# Patient Record
Sex: MALE | Race: OTHER | URBAN - METROPOLITAN AREA
[De-identification: names, ages, dates, MRNs, and addresses within clinical notes are randomized per-mention and may not be internally consistent; named-entity substitution may affect disease eponyms.]

---

## 2018-01-01 ENCOUNTER — INPATIENT (INPATIENT)
Facility: HOSPITAL | Age: 83
LOS: 27 days | DRG: 870 | End: 2018-02-13
Attending: HOSPITALIST | Admitting: HOSPITALIST
Payer: MEDICARE

## 2018-01-01 VITALS
DIASTOLIC BLOOD PRESSURE: 56 MMHG | HEART RATE: 91 BPM | TEMPERATURE: 103 F | OXYGEN SATURATION: 91 % | RESPIRATION RATE: 41 BRPM | SYSTOLIC BLOOD PRESSURE: 108 MMHG

## 2018-01-01 VITALS — WEIGHT: 179.9 LBS | HEIGHT: 64 IN

## 2018-01-01 DIAGNOSIS — J96.00 ACUTE RESPIRATORY FAILURE, UNSPECIFIED WHETHER WITH HYPOXIA OR HYPERCAPNIA: ICD-10-CM

## 2018-01-01 DIAGNOSIS — K92.2 GASTROINTESTINAL HEMORRHAGE, UNSPECIFIED: ICD-10-CM

## 2018-01-01 DIAGNOSIS — I24.8 OTHER FORMS OF ACUTE ISCHEMIC HEART DISEASE: ICD-10-CM

## 2018-01-01 DIAGNOSIS — N18.4 CHRONIC KIDNEY DISEASE, STAGE 4 (SEVERE): ICD-10-CM

## 2018-01-01 DIAGNOSIS — K25.9 GASTRIC ULCER, UNSPECIFIED AS ACUTE OR CHRONIC, WITHOUT HEMORRHAGE OR PERFORATION: ICD-10-CM

## 2018-01-01 DIAGNOSIS — R13.10 DYSPHAGIA, UNSPECIFIED: ICD-10-CM

## 2018-01-01 DIAGNOSIS — D64.9 ANEMIA, UNSPECIFIED: ICD-10-CM

## 2018-01-01 DIAGNOSIS — E83.39 OTHER DISORDERS OF PHOSPHORUS METABOLISM: ICD-10-CM

## 2018-01-01 DIAGNOSIS — R50.9 FEVER, UNSPECIFIED: ICD-10-CM

## 2018-01-01 DIAGNOSIS — G93.41 METABOLIC ENCEPHALOPATHY: ICD-10-CM

## 2018-01-01 DIAGNOSIS — E11.9 TYPE 2 DIABETES MELLITUS WITHOUT COMPLICATIONS: ICD-10-CM

## 2018-01-01 DIAGNOSIS — I48.91 UNSPECIFIED ATRIAL FIBRILLATION: ICD-10-CM

## 2018-01-01 DIAGNOSIS — E86.0 DEHYDRATION: ICD-10-CM

## 2018-01-01 DIAGNOSIS — E87.2 ACIDOSIS: ICD-10-CM

## 2018-01-01 DIAGNOSIS — K70.30 ALCOHOLIC CIRRHOSIS OF LIVER WITHOUT ASCITES: ICD-10-CM

## 2018-01-01 DIAGNOSIS — K76.7 HEPATORENAL SYNDROME: ICD-10-CM

## 2018-01-01 DIAGNOSIS — K55.9 VASCULAR DISORDER OF INTESTINE, UNSPECIFIED: ICD-10-CM

## 2018-01-01 DIAGNOSIS — N17.9 ACUTE KIDNEY FAILURE, UNSPECIFIED: ICD-10-CM

## 2018-01-01 DIAGNOSIS — I50.22 CHRONIC SYSTOLIC (CONGESTIVE) HEART FAILURE: ICD-10-CM

## 2018-01-01 DIAGNOSIS — R65.21 SEVERE SEPSIS WITH SEPTIC SHOCK: ICD-10-CM

## 2018-01-01 DIAGNOSIS — J69.0 PNEUMONITIS DUE TO INHALATION OF FOOD AND VOMIT: ICD-10-CM

## 2018-01-01 DIAGNOSIS — Z29.9 ENCOUNTER FOR PROPHYLACTIC MEASURES, UNSPECIFIED: ICD-10-CM

## 2018-01-01 DIAGNOSIS — J96.01 ACUTE RESPIRATORY FAILURE WITH HYPOXIA: ICD-10-CM

## 2018-01-01 DIAGNOSIS — A41.9 SEPSIS, UNSPECIFIED ORGANISM: ICD-10-CM

## 2018-01-01 DIAGNOSIS — E87.5 HYPERKALEMIA: ICD-10-CM

## 2018-01-01 DIAGNOSIS — F99 MENTAL DISORDER, NOT OTHERWISE SPECIFIED: ICD-10-CM

## 2018-01-01 DIAGNOSIS — F05 DELIRIUM DUE TO KNOWN PHYSIOLOGICAL CONDITION: ICD-10-CM

## 2018-01-01 DIAGNOSIS — E83.42 HYPOMAGNESEMIA: ICD-10-CM

## 2018-01-01 DIAGNOSIS — R45.1 RESTLESSNESS AND AGITATION: ICD-10-CM

## 2018-01-01 DIAGNOSIS — E87.0 HYPEROSMOLALITY AND HYPERNATREMIA: ICD-10-CM

## 2018-01-01 DIAGNOSIS — K62.5 HEMORRHAGE OF ANUS AND RECTUM: ICD-10-CM

## 2018-01-01 DIAGNOSIS — F10.10 ALCOHOL ABUSE, UNCOMPLICATED: ICD-10-CM

## 2018-01-01 DIAGNOSIS — N18.3 CHRONIC KIDNEY DISEASE, STAGE 3 (MODERATE): ICD-10-CM

## 2018-01-01 DIAGNOSIS — K52.9 NONINFECTIVE GASTROENTERITIS AND COLITIS, UNSPECIFIED: ICD-10-CM

## 2018-01-01 DIAGNOSIS — J11.1 INFLUENZA DUE TO UNIDENTIFIED INFLUENZA VIRUS WITH OTHER RESPIRATORY MANIFESTATIONS: ICD-10-CM

## 2018-01-01 DIAGNOSIS — E87.6 HYPOKALEMIA: ICD-10-CM

## 2018-01-01 DIAGNOSIS — K64.9 UNSPECIFIED HEMORRHOIDS: ICD-10-CM

## 2018-01-01 DIAGNOSIS — N17.0 ACUTE KIDNEY FAILURE WITH TUBULAR NECROSIS: ICD-10-CM

## 2018-01-01 DIAGNOSIS — R74.0 NONSPECIFIC ELEVATION OF LEVELS OF TRANSAMINASE AND LACTIC ACID DEHYDROGENASE [LDH]: ICD-10-CM

## 2018-01-01 DIAGNOSIS — M62.82 RHABDOMYOLYSIS: ICD-10-CM

## 2018-01-01 DIAGNOSIS — F03.90 UNSPECIFIED DEMENTIA WITHOUT BEHAVIORAL DISTURBANCE: ICD-10-CM

## 2018-01-01 DIAGNOSIS — R53.81 OTHER MALAISE: ICD-10-CM

## 2018-01-01 DIAGNOSIS — I50.40 UNSPECIFIED COMBINED SYSTOLIC (CONGESTIVE) AND DIASTOLIC (CONGESTIVE) HEART FAILURE: ICD-10-CM

## 2018-01-01 DIAGNOSIS — K74.60 UNSPECIFIED CIRRHOSIS OF LIVER: ICD-10-CM

## 2018-01-01 DIAGNOSIS — I48.0 PAROXYSMAL ATRIAL FIBRILLATION: ICD-10-CM

## 2018-01-01 DIAGNOSIS — E86.1 HYPOVOLEMIA: ICD-10-CM

## 2018-01-01 DIAGNOSIS — J96.90 RESPIRATORY FAILURE, UNSPECIFIED, UNSPECIFIED WHETHER WITH HYPOXIA OR HYPERCAPNIA: ICD-10-CM

## 2018-01-01 DIAGNOSIS — I50.9 HEART FAILURE, UNSPECIFIED: ICD-10-CM

## 2018-01-01 DIAGNOSIS — I13.0 HYPERTENSIVE HEART AND CHRONIC KIDNEY DISEASE WITH HEART FAILURE AND STAGE 1 THROUGH STAGE 4 CHRONIC KIDNEY DISEASE, OR UNSPECIFIED CHRONIC KIDNEY DISEASE: ICD-10-CM

## 2018-01-01 DIAGNOSIS — Z66 DO NOT RESUSCITATE: ICD-10-CM

## 2018-01-01 DIAGNOSIS — Z51.5 ENCOUNTER FOR PALLIATIVE CARE: ICD-10-CM

## 2018-01-01 DIAGNOSIS — K63.3 ULCER OF INTESTINE: ICD-10-CM

## 2018-01-01 DIAGNOSIS — N28.1 CYST OF KIDNEY, ACQUIRED: ICD-10-CM

## 2018-01-01 LAB
ALBUMIN SERPL ELPH-MCNC: 1.9 G/DL — LOW (ref 3.5–5)
ALBUMIN SERPL ELPH-MCNC: 1.9 G/DL — LOW (ref 3.5–5)
ALBUMIN SERPL ELPH-MCNC: 2.2 G/DL — LOW (ref 3.5–5)
ALBUMIN SERPL ELPH-MCNC: 2.3 G/DL — LOW (ref 3.5–5)
ALBUMIN SERPL ELPH-MCNC: 2.3 G/DL — LOW (ref 3.5–5)
ALBUMIN SERPL ELPH-MCNC: 2.4 G/DL — LOW (ref 3.5–5)
ALBUMIN SERPL ELPH-MCNC: 2.5 G/DL — LOW (ref 3.5–5)
ALBUMIN SERPL ELPH-MCNC: 2.5 G/DL — LOW (ref 3.5–5)
ALBUMIN SERPL ELPH-MCNC: 2.6 G/DL — LOW (ref 3.5–5)
ALBUMIN SERPL ELPH-MCNC: 3 G/DL — LOW (ref 3.5–5)
ALBUMIN SERPL ELPH-MCNC: 3.3 G/DL — LOW (ref 3.5–5)
ALP SERPL-CCNC: 40 U/L — SIGNIFICANT CHANGE UP (ref 40–120)
ALP SERPL-CCNC: 40 U/L — SIGNIFICANT CHANGE UP (ref 40–120)
ALP SERPL-CCNC: 47 U/L — SIGNIFICANT CHANGE UP (ref 40–120)
ALP SERPL-CCNC: 49 U/L — SIGNIFICANT CHANGE UP (ref 40–120)
ALP SERPL-CCNC: 51 U/L — SIGNIFICANT CHANGE UP (ref 40–120)
ALP SERPL-CCNC: 51 U/L — SIGNIFICANT CHANGE UP (ref 40–120)
ALP SERPL-CCNC: 58 U/L — SIGNIFICANT CHANGE UP (ref 40–120)
ALP SERPL-CCNC: 65 U/L — SIGNIFICANT CHANGE UP (ref 40–120)
ALP SERPL-CCNC: 71 U/L — SIGNIFICANT CHANGE UP (ref 40–120)
ALP SERPL-CCNC: 72 U/L — SIGNIFICANT CHANGE UP (ref 40–120)
ALP SERPL-CCNC: 72 U/L — SIGNIFICANT CHANGE UP (ref 40–120)
ALP SERPL-CCNC: 75 U/L — SIGNIFICANT CHANGE UP (ref 40–120)
ALP SERPL-CCNC: 75 U/L — SIGNIFICANT CHANGE UP (ref 40–120)
ALT FLD-CCNC: 113 U/L DA — HIGH (ref 10–60)
ALT FLD-CCNC: 131 U/L DA — HIGH (ref 10–60)
ALT FLD-CCNC: 179 U/L DA — HIGH (ref 10–60)
ALT FLD-CCNC: 212 U/L DA — HIGH (ref 10–60)
ALT FLD-CCNC: 30 U/L DA — SIGNIFICANT CHANGE UP (ref 10–60)
ALT FLD-CCNC: 34 U/L DA — SIGNIFICANT CHANGE UP (ref 10–60)
ALT FLD-CCNC: 40 U/L DA — SIGNIFICANT CHANGE UP (ref 10–60)
ALT FLD-CCNC: 48 U/L DA — SIGNIFICANT CHANGE UP (ref 10–60)
ALT FLD-CCNC: 49 U/L DA — SIGNIFICANT CHANGE UP (ref 10–60)
ALT FLD-CCNC: 53 U/L DA — SIGNIFICANT CHANGE UP (ref 10–60)
ALT FLD-CCNC: 70 U/L DA — HIGH (ref 10–60)
ALT FLD-CCNC: 81 U/L DA — HIGH (ref 10–60)
ALT FLD-CCNC: 96 U/L DA — HIGH (ref 10–60)
AMMONIA BLD-MCNC: 17 UMOL/L — SIGNIFICANT CHANGE UP (ref 11–32)
AMMONIA BLD-MCNC: 57 UMOL/L — HIGH (ref 11–32)
ANION GAP SERPL CALC-SCNC: 10 MMOL/L — SIGNIFICANT CHANGE UP (ref 5–17)
ANION GAP SERPL CALC-SCNC: 11 MMOL/L — SIGNIFICANT CHANGE UP (ref 5–17)
ANION GAP SERPL CALC-SCNC: 12 MMOL/L — SIGNIFICANT CHANGE UP (ref 5–17)
ANION GAP SERPL CALC-SCNC: 13 MMOL/L — SIGNIFICANT CHANGE UP (ref 5–17)
ANION GAP SERPL CALC-SCNC: 15 MMOL/L — SIGNIFICANT CHANGE UP (ref 5–17)
ANION GAP SERPL CALC-SCNC: 19 MMOL/L — HIGH (ref 5–17)
ANION GAP SERPL CALC-SCNC: 27 MMOL/L — HIGH (ref 5–17)
ANION GAP SERPL CALC-SCNC: 6 MMOL/L — SIGNIFICANT CHANGE UP (ref 5–17)
ANION GAP SERPL CALC-SCNC: 7 MMOL/L — SIGNIFICANT CHANGE UP (ref 5–17)
ANION GAP SERPL CALC-SCNC: 8 MMOL/L — SIGNIFICANT CHANGE UP (ref 5–17)
ANION GAP SERPL CALC-SCNC: 9 MMOL/L — SIGNIFICANT CHANGE UP (ref 5–17)
APPEARANCE UR: ABNORMAL
APPEARANCE UR: CLEAR — SIGNIFICANT CHANGE UP
APTT BLD: 180.2 SEC — SIGNIFICANT CHANGE UP (ref 27.5–37.4)
APTT BLD: 25.4 SEC — LOW (ref 27.5–37.4)
APTT BLD: 26 SEC — LOW (ref 27.5–37.4)
APTT BLD: 26.2 SEC — LOW (ref 27.5–37.4)
APTT BLD: 26.8 SEC — LOW (ref 27.5–37.4)
APTT BLD: 27.8 SEC — SIGNIFICANT CHANGE UP (ref 27.5–37.4)
APTT BLD: 37.7 SEC — HIGH (ref 27.5–37.4)
APTT BLD: 88.9 SEC — HIGH (ref 27.5–37.4)
APTT BLD: 90.3 SEC — HIGH (ref 27.5–37.4)
AST SERPL-CCNC: 163 U/L — HIGH (ref 10–40)
AST SERPL-CCNC: 247 U/L — HIGH (ref 10–40)
AST SERPL-CCNC: 291 U/L — HIGH (ref 10–40)
AST SERPL-CCNC: 32 U/L — SIGNIFICANT CHANGE UP (ref 10–40)
AST SERPL-CCNC: 332 U/L — HIGH (ref 10–40)
AST SERPL-CCNC: 35 U/L — SIGNIFICANT CHANGE UP (ref 10–40)
AST SERPL-CCNC: 35 U/L — SIGNIFICANT CHANGE UP (ref 10–40)
AST SERPL-CCNC: 378 U/L — HIGH (ref 10–40)
AST SERPL-CCNC: 41 U/L — HIGH (ref 10–40)
AST SERPL-CCNC: 52 U/L — HIGH (ref 10–40)
AST SERPL-CCNC: 53 U/L — HIGH (ref 10–40)
BASE EXCESS BLDA CALC-SCNC: -16.8 MMOL/L — LOW (ref -2–2)
BASE EXCESS BLDA CALC-SCNC: -2.2 MMOL/L — LOW (ref -2–2)
BASE EXCESS BLDA CALC-SCNC: -3.1 MMOL/L — LOW (ref -2–2)
BASE EXCESS BLDA CALC-SCNC: -3.7 MMOL/L — LOW (ref -2–2)
BASE EXCESS BLDA CALC-SCNC: -6 MMOL/L — LOW (ref -2–2)
BASE EXCESS BLDA CALC-SCNC: -7.3 MMOL/L — LOW (ref -2–2)
BASE EXCESS BLDA CALC-SCNC: -9 MMOL/L — LOW (ref -2–2)
BASE EXCESS BLDV CALC-SCNC: -9.2 MMOL/L — LOW (ref -2–2)
BASOPHILS # BLD AUTO: 0 K/UL — SIGNIFICANT CHANGE UP (ref 0–0.2)
BASOPHILS # BLD AUTO: 0.1 K/UL — SIGNIFICANT CHANGE UP (ref 0–0.2)
BASOPHILS # BLD AUTO: 0.2 K/UL — SIGNIFICANT CHANGE UP (ref 0–0.2)
BASOPHILS # BLD AUTO: 0.2 K/UL — SIGNIFICANT CHANGE UP (ref 0–0.2)
BASOPHILS NFR BLD AUTO: 0.2 % — SIGNIFICANT CHANGE UP (ref 0–2)
BASOPHILS NFR BLD AUTO: 0.5 % — SIGNIFICANT CHANGE UP (ref 0–2)
BASOPHILS NFR BLD AUTO: 0.5 % — SIGNIFICANT CHANGE UP (ref 0–2)
BASOPHILS NFR BLD AUTO: 0.6 % — SIGNIFICANT CHANGE UP (ref 0–2)
BASOPHILS NFR BLD AUTO: 0.6 % — SIGNIFICANT CHANGE UP (ref 0–2)
BASOPHILS NFR BLD AUTO: 0.7 % — SIGNIFICANT CHANGE UP (ref 0–2)
BASOPHILS NFR BLD AUTO: 0.8 % — SIGNIFICANT CHANGE UP (ref 0–2)
BASOPHILS NFR BLD AUTO: 0.9 % — SIGNIFICANT CHANGE UP (ref 0–2)
BASOPHILS NFR BLD AUTO: 1 % — SIGNIFICANT CHANGE UP (ref 0–2)
BASOPHILS NFR BLD AUTO: 1.1 % — SIGNIFICANT CHANGE UP (ref 0–2)
BASOPHILS NFR BLD AUTO: 1.3 % — SIGNIFICANT CHANGE UP (ref 0–2)
BASOPHILS NFR BLD AUTO: 1.4 % — SIGNIFICANT CHANGE UP (ref 0–2)
BASOPHILS NFR BLD AUTO: 2.1 % — HIGH (ref 0–2)
BILIRUB DIRECT SERPL-MCNC: 0.5 MG/DL — HIGH (ref 0–0.2)
BILIRUB INDIRECT FLD-MCNC: 0.5 MG/DL — SIGNIFICANT CHANGE UP (ref 0.2–1)
BILIRUB SERPL-MCNC: 0.4 MG/DL — SIGNIFICANT CHANGE UP (ref 0.2–1.2)
BILIRUB SERPL-MCNC: 0.5 MG/DL — SIGNIFICANT CHANGE UP (ref 0.2–1.2)
BILIRUB SERPL-MCNC: 0.5 MG/DL — SIGNIFICANT CHANGE UP (ref 0.2–1.2)
BILIRUB SERPL-MCNC: 0.6 MG/DL — SIGNIFICANT CHANGE UP (ref 0.2–1.2)
BILIRUB SERPL-MCNC: 0.7 MG/DL — SIGNIFICANT CHANGE UP (ref 0.2–1.2)
BILIRUB SERPL-MCNC: 0.7 MG/DL — SIGNIFICANT CHANGE UP (ref 0.2–1.2)
BILIRUB SERPL-MCNC: 0.9 MG/DL — SIGNIFICANT CHANGE UP (ref 0.2–1.2)
BILIRUB SERPL-MCNC: 1 MG/DL — SIGNIFICANT CHANGE UP (ref 0.2–1.2)
BILIRUB SERPL-MCNC: 1 MG/DL — SIGNIFICANT CHANGE UP (ref 0.2–1.2)
BILIRUB SERPL-MCNC: 1.1 MG/DL — SIGNIFICANT CHANGE UP (ref 0.2–1.2)
BILIRUB SERPL-MCNC: 1.2 MG/DL — SIGNIFICANT CHANGE UP (ref 0.2–1.2)
BILIRUB UR-MCNC: ABNORMAL
BILIRUB UR-MCNC: NEGATIVE — SIGNIFICANT CHANGE UP
BLOOD GAS COMMENTS ARTERIAL: SIGNIFICANT CHANGE UP
BUN SERPL-MCNC: 10 MG/DL — SIGNIFICANT CHANGE UP (ref 7–18)
BUN SERPL-MCNC: 10 MG/DL — SIGNIFICANT CHANGE UP (ref 7–18)
BUN SERPL-MCNC: 11 MG/DL — SIGNIFICANT CHANGE UP (ref 7–18)
BUN SERPL-MCNC: 12 MG/DL — SIGNIFICANT CHANGE UP (ref 7–18)
BUN SERPL-MCNC: 13 MG/DL — SIGNIFICANT CHANGE UP (ref 7–18)
BUN SERPL-MCNC: 14 MG/DL — SIGNIFICANT CHANGE UP (ref 7–18)
BUN SERPL-MCNC: 17 MG/DL — SIGNIFICANT CHANGE UP (ref 7–18)
BUN SERPL-MCNC: 18 MG/DL — SIGNIFICANT CHANGE UP (ref 7–18)
BUN SERPL-MCNC: 22 MG/DL — HIGH (ref 7–18)
BUN SERPL-MCNC: 25 MG/DL — HIGH (ref 7–18)
BUN SERPL-MCNC: 34 MG/DL — HIGH (ref 7–18)
BUN SERPL-MCNC: 34 MG/DL — HIGH (ref 7–18)
BUN SERPL-MCNC: 43 MG/DL — HIGH (ref 7–18)
BUN SERPL-MCNC: 46 MG/DL — HIGH (ref 7–18)
BUN SERPL-MCNC: 47 MG/DL — HIGH (ref 7–18)
BUN SERPL-MCNC: 55 MG/DL — HIGH (ref 7–18)
BUN SERPL-MCNC: 62 MG/DL — HIGH (ref 7–18)
BUN SERPL-MCNC: 62 MG/DL — HIGH (ref 7–18)
BUN SERPL-MCNC: 66 MG/DL — HIGH (ref 7–18)
BUN SERPL-MCNC: 67 MG/DL — HIGH (ref 7–18)
BUN SERPL-MCNC: 70 MG/DL — HIGH (ref 7–18)
BUN SERPL-MCNC: 71 MG/DL — HIGH (ref 7–18)
BUN SERPL-MCNC: 73 MG/DL — HIGH (ref 7–18)
BUN SERPL-MCNC: 73 MG/DL — HIGH (ref 7–18)
BUN SERPL-MCNC: 78 MG/DL — HIGH (ref 7–18)
BUN SERPL-MCNC: 8 MG/DL — SIGNIFICANT CHANGE UP (ref 7–18)
BUN SERPL-MCNC: 80 MG/DL — HIGH (ref 7–18)
BUN SERPL-MCNC: 9 MG/DL — SIGNIFICANT CHANGE UP (ref 7–18)
BUN SERPL-MCNC: SIGNIFICANT CHANGE UP MG/DL (ref 7–18)
CALCIUM SERPL-MCNC: 10.6 MG/DL — HIGH (ref 8.4–10.5)
CALCIUM SERPL-MCNC: 8.2 MG/DL — LOW (ref 8.4–10.5)
CALCIUM SERPL-MCNC: 8.2 MG/DL — LOW (ref 8.4–10.5)
CALCIUM SERPL-MCNC: 8.3 MG/DL — LOW (ref 8.4–10.5)
CALCIUM SERPL-MCNC: 8.4 MG/DL — SIGNIFICANT CHANGE UP (ref 8.4–10.5)
CALCIUM SERPL-MCNC: 8.5 MG/DL — SIGNIFICANT CHANGE UP (ref 8.4–10.5)
CALCIUM SERPL-MCNC: 8.6 MG/DL — SIGNIFICANT CHANGE UP (ref 8.4–10.5)
CALCIUM SERPL-MCNC: 8.7 MG/DL — SIGNIFICANT CHANGE UP (ref 8.4–10.5)
CALCIUM SERPL-MCNC: 8.8 MG/DL — SIGNIFICANT CHANGE UP (ref 8.4–10.5)
CALCIUM SERPL-MCNC: 8.9 MG/DL — SIGNIFICANT CHANGE UP (ref 8.4–10.5)
CALCIUM SERPL-MCNC: 8.9 MG/DL — SIGNIFICANT CHANGE UP (ref 8.4–10.5)
CALCIUM SERPL-MCNC: 9 MG/DL — SIGNIFICANT CHANGE UP (ref 8.4–10.5)
CALCIUM SERPL-MCNC: 9.1 MG/DL — SIGNIFICANT CHANGE UP (ref 8.4–10.5)
CALCIUM SERPL-MCNC: 9.2 MG/DL — SIGNIFICANT CHANGE UP (ref 8.4–10.5)
CALCIUM SERPL-MCNC: 9.6 MG/DL — SIGNIFICANT CHANGE UP (ref 8.4–10.5)
CALCIUM SERPL-MCNC: SIGNIFICANT CHANGE UP MG/DL (ref 8.4–10.5)
CHLORIDE SERPL-SCNC: 104 MMOL/L — SIGNIFICANT CHANGE UP (ref 96–108)
CHLORIDE SERPL-SCNC: 104 MMOL/L — SIGNIFICANT CHANGE UP (ref 96–108)
CHLORIDE SERPL-SCNC: 105 MMOL/L — SIGNIFICANT CHANGE UP (ref 96–108)
CHLORIDE SERPL-SCNC: 106 MMOL/L — SIGNIFICANT CHANGE UP (ref 96–108)
CHLORIDE SERPL-SCNC: 107 MMOL/L — SIGNIFICANT CHANGE UP (ref 96–108)
CHLORIDE SERPL-SCNC: 108 MMOL/L — SIGNIFICANT CHANGE UP (ref 96–108)
CHLORIDE SERPL-SCNC: 109 MMOL/L — HIGH (ref 96–108)
CHLORIDE SERPL-SCNC: 110 MMOL/L — HIGH (ref 96–108)
CHLORIDE SERPL-SCNC: 111 MMOL/L — HIGH (ref 96–108)
CHLORIDE SERPL-SCNC: 112 MMOL/L — HIGH (ref 96–108)
CHLORIDE SERPL-SCNC: 113 MMOL/L — HIGH (ref 96–108)
CHLORIDE SERPL-SCNC: 114 MMOL/L — HIGH (ref 96–108)
CHLORIDE SERPL-SCNC: 116 MMOL/L — HIGH (ref 96–108)
CHLORIDE SERPL-SCNC: 116 MMOL/L — HIGH (ref 96–108)
CHLORIDE SERPL-SCNC: 117 MMOL/L — HIGH (ref 96–108)
CHLORIDE SERPL-SCNC: 117 MMOL/L — HIGH (ref 96–108)
CHLORIDE SERPL-SCNC: 118 MMOL/L — HIGH (ref 96–108)
CHLORIDE SERPL-SCNC: 118 MMOL/L — HIGH (ref 96–108)
CHLORIDE SERPL-SCNC: 119 MMOL/L — HIGH (ref 96–108)
CHLORIDE SERPL-SCNC: 119 MMOL/L — HIGH (ref 96–108)
CHLORIDE SERPL-SCNC: 120 MMOL/L — HIGH (ref 96–108)
CHLORIDE SERPL-SCNC: 120 MMOL/L — HIGH (ref 96–108)
CHLORIDE SERPL-SCNC: 121 MMOL/L — HIGH (ref 96–108)
CHLORIDE SERPL-SCNC: 122 MMOL/L — HIGH (ref 96–108)
CHLORIDE SERPL-SCNC: 122 MMOL/L — HIGH (ref 96–108)
CHLORIDE SERPL-SCNC: SIGNIFICANT CHANGE UP MMOL/L (ref 96–108)
CHLORIDE UR-SCNC: 49 MMOL/L — LOW (ref 55–125)
CHOLEST SERPL-MCNC: 105 MG/DL — SIGNIFICANT CHANGE UP (ref 10–199)
CHOLEST SERPL-MCNC: 132 MG/DL — SIGNIFICANT CHANGE UP (ref 10–199)
CK MB BLD-MCNC: 0.5 % — SIGNIFICANT CHANGE UP (ref 0–3.5)
CK MB BLD-MCNC: 0.5 % — SIGNIFICANT CHANGE UP (ref 0–3.5)
CK MB BLD-MCNC: 0.6 % — SIGNIFICANT CHANGE UP (ref 0–3.5)
CK MB CFR SERPL CALC: 5 NG/ML — HIGH (ref 0–3.6)
CK MB CFR SERPL CALC: 8.4 NG/ML — HIGH (ref 0–3.6)
CK MB CFR SERPL CALC: 8.7 NG/ML — HIGH (ref 0–3.6)
CK SERPL-CCNC: 1531 U/L — HIGH (ref 35–232)
CK SERPL-CCNC: 1591 U/L — HIGH (ref 35–232)
CK SERPL-CCNC: 871 U/L — HIGH (ref 35–232)
CO2 SERPL-SCNC: 13 MMOL/L — LOW (ref 22–31)
CO2 SERPL-SCNC: 19 MMOL/L — LOW (ref 22–31)
CO2 SERPL-SCNC: 20 MMOL/L — LOW (ref 22–31)
CO2 SERPL-SCNC: 22 MMOL/L — SIGNIFICANT CHANGE UP (ref 22–31)
CO2 SERPL-SCNC: 23 MMOL/L — SIGNIFICANT CHANGE UP (ref 22–31)
CO2 SERPL-SCNC: 24 MMOL/L — SIGNIFICANT CHANGE UP (ref 22–31)
CO2 SERPL-SCNC: 25 MMOL/L — SIGNIFICANT CHANGE UP (ref 22–31)
CO2 SERPL-SCNC: 26 MMOL/L — SIGNIFICANT CHANGE UP (ref 22–31)
CO2 SERPL-SCNC: 27 MMOL/L — SIGNIFICANT CHANGE UP (ref 22–31)
CO2 SERPL-SCNC: 28 MMOL/L — SIGNIFICANT CHANGE UP (ref 22–31)
CO2 SERPL-SCNC: 29 MMOL/L — SIGNIFICANT CHANGE UP (ref 22–31)
CO2 SERPL-SCNC: SIGNIFICANT CHANGE UP MMOL/L (ref 22–31)
COLOR SPEC: YELLOW — SIGNIFICANT CHANGE UP
COLOR SPEC: YELLOW — SIGNIFICANT CHANGE UP
CREAT ?TM UR-MCNC: 117 MG/DL — SIGNIFICANT CHANGE UP
CREAT ?TM UR-MCNC: 206 MG/DL — SIGNIFICANT CHANGE UP
CREAT ?TM UR-MCNC: 44 MG/DL — SIGNIFICANT CHANGE UP
CREAT SERPL-MCNC: 0.68 MG/DL — SIGNIFICANT CHANGE UP (ref 0.5–1.3)
CREAT SERPL-MCNC: 0.7 MG/DL — SIGNIFICANT CHANGE UP (ref 0.5–1.3)
CREAT SERPL-MCNC: 0.73 MG/DL — SIGNIFICANT CHANGE UP (ref 0.5–1.3)
CREAT SERPL-MCNC: 0.75 MG/DL — SIGNIFICANT CHANGE UP (ref 0.5–1.3)
CREAT SERPL-MCNC: 0.78 MG/DL — SIGNIFICANT CHANGE UP (ref 0.5–1.3)
CREAT SERPL-MCNC: 0.78 MG/DL — SIGNIFICANT CHANGE UP (ref 0.5–1.3)
CREAT SERPL-MCNC: 0.85 MG/DL — SIGNIFICANT CHANGE UP (ref 0.5–1.3)
CREAT SERPL-MCNC: 0.89 MG/DL — SIGNIFICANT CHANGE UP (ref 0.5–1.3)
CREAT SERPL-MCNC: 0.89 MG/DL — SIGNIFICANT CHANGE UP (ref 0.5–1.3)
CREAT SERPL-MCNC: 0.93 MG/DL — SIGNIFICANT CHANGE UP (ref 0.5–1.3)
CREAT SERPL-MCNC: 0.94 MG/DL — SIGNIFICANT CHANGE UP (ref 0.5–1.3)
CREAT SERPL-MCNC: 0.97 MG/DL — SIGNIFICANT CHANGE UP (ref 0.5–1.3)
CREAT SERPL-MCNC: 1.03 MG/DL — SIGNIFICANT CHANGE UP (ref 0.5–1.3)
CREAT SERPL-MCNC: 1.08 MG/DL — SIGNIFICANT CHANGE UP (ref 0.5–1.3)
CREAT SERPL-MCNC: 1.11 MG/DL — SIGNIFICANT CHANGE UP (ref 0.5–1.3)
CREAT SERPL-MCNC: 1.22 MG/DL — SIGNIFICANT CHANGE UP (ref 0.5–1.3)
CREAT SERPL-MCNC: 1.24 MG/DL — SIGNIFICANT CHANGE UP (ref 0.5–1.3)
CREAT SERPL-MCNC: 1.34 MG/DL — HIGH (ref 0.5–1.3)
CREAT SERPL-MCNC: 1.46 MG/DL — HIGH (ref 0.5–1.3)
CREAT SERPL-MCNC: 1.62 MG/DL — HIGH (ref 0.5–1.3)
CREAT SERPL-MCNC: 1.97 MG/DL — HIGH (ref 0.5–1.3)
CREAT SERPL-MCNC: 2.09 MG/DL — HIGH (ref 0.5–1.3)
CREAT SERPL-MCNC: 2.25 MG/DL — HIGH (ref 0.5–1.3)
CREAT SERPL-MCNC: 2.36 MG/DL — HIGH (ref 0.5–1.3)
CREAT SERPL-MCNC: 2.56 MG/DL — HIGH (ref 0.5–1.3)
CREAT SERPL-MCNC: 2.58 MG/DL — HIGH (ref 0.5–1.3)
CREAT SERPL-MCNC: 2.86 MG/DL — HIGH (ref 0.5–1.3)
CREAT SERPL-MCNC: 3.8 MG/DL — HIGH (ref 0.5–1.3)
CREAT SERPL-MCNC: 3.82 MG/DL — HIGH (ref 0.5–1.3)
CREAT SERPL-MCNC: 4.02 MG/DL — HIGH (ref 0.5–1.3)
CREAT SERPL-MCNC: 4.06 MG/DL — HIGH (ref 0.5–1.3)
CREAT SERPL-MCNC: 4.46 MG/DL — HIGH (ref 0.5–1.3)
CREAT SERPL-MCNC: 4.56 MG/DL — HIGH (ref 0.5–1.3)
CREAT SERPL-MCNC: 4.81 MG/DL — HIGH (ref 0.5–1.3)
CREAT SERPL-MCNC: SIGNIFICANT CHANGE UP MG/DL (ref 0.5–1.3)
CULTURE RESULTS: NO GROWTH — SIGNIFICANT CHANGE UP
CULTURE RESULTS: SIGNIFICANT CHANGE UP
DIFF PNL FLD: ABNORMAL
DIFF PNL FLD: ABNORMAL
EOSINOPHIL # BLD AUTO: 0 K/UL — SIGNIFICANT CHANGE UP (ref 0–0.5)
EOSINOPHIL # BLD AUTO: 0.1 K/UL — SIGNIFICANT CHANGE UP (ref 0–0.5)
EOSINOPHIL # BLD AUTO: 0.2 K/UL — SIGNIFICANT CHANGE UP (ref 0–0.5)
EOSINOPHIL # BLD AUTO: 0.2 K/UL — SIGNIFICANT CHANGE UP (ref 0–0.5)
EOSINOPHIL # BLD AUTO: 0.4 K/UL — SIGNIFICANT CHANGE UP (ref 0–0.5)
EOSINOPHIL NFR BLD AUTO: 0 % — SIGNIFICANT CHANGE UP (ref 0–6)
EOSINOPHIL NFR BLD AUTO: 0.1 % — SIGNIFICANT CHANGE UP (ref 0–6)
EOSINOPHIL NFR BLD AUTO: 0.6 % — SIGNIFICANT CHANGE UP (ref 0–6)
EOSINOPHIL NFR BLD AUTO: 1 % — SIGNIFICANT CHANGE UP (ref 0–6)
EOSINOPHIL NFR BLD AUTO: 1 % — SIGNIFICANT CHANGE UP (ref 0–6)
EOSINOPHIL NFR BLD AUTO: 1.1 % — SIGNIFICANT CHANGE UP (ref 0–6)
EOSINOPHIL NFR BLD AUTO: 1.1 % — SIGNIFICANT CHANGE UP (ref 0–6)
EOSINOPHIL NFR BLD AUTO: 1.2 % — SIGNIFICANT CHANGE UP (ref 0–6)
EOSINOPHIL NFR BLD AUTO: 1.5 % — SIGNIFICANT CHANGE UP (ref 0–6)
EOSINOPHIL NFR BLD AUTO: 1.6 % — SIGNIFICANT CHANGE UP (ref 0–6)
EOSINOPHIL NFR BLD AUTO: 2.4 % — SIGNIFICANT CHANGE UP (ref 0–6)
EOSINOPHIL NFR BLD AUTO: 2.4 % — SIGNIFICANT CHANGE UP (ref 0–6)
EOSINOPHIL NFR BLD AUTO: 2.5 % — SIGNIFICANT CHANGE UP (ref 0–6)
EOSINOPHIL NFR BLD AUTO: 2.8 % — SIGNIFICANT CHANGE UP (ref 0–6)
EOSINOPHIL NFR BLD AUTO: 2.9 % — SIGNIFICANT CHANGE UP (ref 0–6)
EOSINOPHIL NFR BLD AUTO: 6.3 % — HIGH (ref 0–6)
ETHANOL SERPL-MCNC: <3 MG/DL — SIGNIFICANT CHANGE UP (ref 0–10)
FLUAV H1 2009 PAND RNA SPEC QL NAA+PROBE: DETECTED
GLUCOSE SERPL-MCNC: 108 MG/DL — HIGH (ref 70–99)
GLUCOSE SERPL-MCNC: 120 MG/DL — HIGH (ref 70–99)
GLUCOSE SERPL-MCNC: 121 MG/DL — HIGH (ref 70–99)
GLUCOSE SERPL-MCNC: 123 MG/DL — HIGH (ref 70–99)
GLUCOSE SERPL-MCNC: 127 MG/DL — HIGH (ref 70–99)
GLUCOSE SERPL-MCNC: 129 MG/DL — HIGH (ref 70–99)
GLUCOSE SERPL-MCNC: 131 MG/DL — HIGH (ref 70–99)
GLUCOSE SERPL-MCNC: 146 MG/DL — HIGH (ref 70–99)
GLUCOSE SERPL-MCNC: 149 MG/DL — HIGH (ref 70–99)
GLUCOSE SERPL-MCNC: 150 MG/DL — HIGH (ref 70–99)
GLUCOSE SERPL-MCNC: 151 MG/DL — HIGH (ref 70–99)
GLUCOSE SERPL-MCNC: 162 MG/DL — HIGH (ref 70–99)
GLUCOSE SERPL-MCNC: 175 MG/DL — HIGH (ref 70–99)
GLUCOSE SERPL-MCNC: 175 MG/DL — HIGH (ref 70–99)
GLUCOSE SERPL-MCNC: 177 MG/DL — HIGH (ref 70–99)
GLUCOSE SERPL-MCNC: 179 MG/DL — HIGH (ref 70–99)
GLUCOSE SERPL-MCNC: 191 MG/DL — HIGH (ref 70–99)
GLUCOSE SERPL-MCNC: 193 MG/DL — HIGH (ref 70–99)
GLUCOSE SERPL-MCNC: 198 MG/DL — HIGH (ref 70–99)
GLUCOSE SERPL-MCNC: 210 MG/DL — HIGH (ref 70–99)
GLUCOSE SERPL-MCNC: 213 MG/DL — HIGH (ref 70–99)
GLUCOSE SERPL-MCNC: 223 MG/DL — HIGH (ref 70–99)
GLUCOSE SERPL-MCNC: 231 MG/DL — HIGH (ref 70–99)
GLUCOSE SERPL-MCNC: 241 MG/DL — HIGH (ref 70–99)
GLUCOSE SERPL-MCNC: 245 MG/DL — HIGH (ref 70–99)
GLUCOSE SERPL-MCNC: 248 MG/DL — HIGH (ref 70–99)
GLUCOSE SERPL-MCNC: 265 MG/DL — HIGH (ref 70–99)
GLUCOSE SERPL-MCNC: 286 MG/DL — HIGH (ref 70–99)
GLUCOSE SERPL-MCNC: 294 MG/DL — HIGH (ref 70–99)
GLUCOSE SERPL-MCNC: 305 MG/DL — HIGH (ref 70–99)
GLUCOSE SERPL-MCNC: 96 MG/DL — SIGNIFICANT CHANGE UP (ref 70–99)
GLUCOSE SERPL-MCNC: 97 MG/DL — SIGNIFICANT CHANGE UP (ref 70–99)
GLUCOSE SERPL-MCNC: 98 MG/DL — SIGNIFICANT CHANGE UP (ref 70–99)
GLUCOSE SERPL-MCNC: 98 MG/DL — SIGNIFICANT CHANGE UP (ref 70–99)
GLUCOSE SERPL-MCNC: SIGNIFICANT CHANGE UP MG/DL (ref 70–99)
GLUCOSE UR QL: NEGATIVE — SIGNIFICANT CHANGE UP
GLUCOSE UR QL: NEGATIVE — SIGNIFICANT CHANGE UP
GRAM STN FLD: SIGNIFICANT CHANGE UP
GRAM STN FLD: SIGNIFICANT CHANGE UP
HAV IGM SER-ACNC: SIGNIFICANT CHANGE UP
HBA1C BLD-MCNC: 7.5 % — HIGH (ref 4–5.6)
HBV CORE IGM SER-ACNC: SIGNIFICANT CHANGE UP
HBV SURFACE AG SER-ACNC: SIGNIFICANT CHANGE UP
HCO3 BLDA-SCNC: 16 MMOL/L — LOW (ref 23–27)
HCO3 BLDA-SCNC: 16 MMOL/L — LOW (ref 23–27)
HCO3 BLDA-SCNC: 18 MMOL/L — LOW (ref 23–27)
HCO3 BLDA-SCNC: 18 MMOL/L — LOW (ref 23–27)
HCO3 BLDA-SCNC: 20 MMOL/L — LOW (ref 23–27)
HCO3 BLDA-SCNC: 22 MMOL/L — LOW (ref 23–27)
HCO3 BLDA-SCNC: 8 MMOL/L — LOW (ref 23–27)
HCO3 BLDV-SCNC: 18 MMOL/L — LOW (ref 21–29)
HCT VFR BLD CALC: 34.7 % — LOW (ref 39–50)
HCT VFR BLD CALC: 35.1 % — LOW (ref 39–50)
HCT VFR BLD CALC: 35.9 % — LOW (ref 39–50)
HCT VFR BLD CALC: 36.1 % — LOW (ref 39–50)
HCT VFR BLD CALC: 36.2 % — LOW (ref 39–50)
HCT VFR BLD CALC: 36.3 % — LOW (ref 39–50)
HCT VFR BLD CALC: 36.4 % — LOW (ref 39–50)
HCT VFR BLD CALC: 37.5 % — LOW (ref 39–50)
HCT VFR BLD CALC: 37.9 % — LOW (ref 39–50)
HCT VFR BLD CALC: 38 % — LOW (ref 39–50)
HCT VFR BLD CALC: 38.3 % — LOW (ref 39–50)
HCT VFR BLD CALC: 38.4 % — LOW (ref 39–50)
HCT VFR BLD CALC: 38.5 % — LOW (ref 39–50)
HCT VFR BLD CALC: 39 % — SIGNIFICANT CHANGE UP (ref 39–50)
HCT VFR BLD CALC: 39.1 % — SIGNIFICANT CHANGE UP (ref 39–50)
HCT VFR BLD CALC: 39.4 % — SIGNIFICANT CHANGE UP (ref 39–50)
HCT VFR BLD CALC: 39.4 % — SIGNIFICANT CHANGE UP (ref 39–50)
HCT VFR BLD CALC: 41.5 % — SIGNIFICANT CHANGE UP (ref 39–50)
HCT VFR BLD CALC: 41.8 % — SIGNIFICANT CHANGE UP (ref 39–50)
HCT VFR BLD CALC: 42 % — SIGNIFICANT CHANGE UP (ref 39–50)
HCT VFR BLD CALC: 42.1 % — SIGNIFICANT CHANGE UP (ref 39–50)
HCT VFR BLD CALC: 42.4 % — SIGNIFICANT CHANGE UP (ref 39–50)
HCT VFR BLD CALC: 42.8 % — SIGNIFICANT CHANGE UP (ref 39–50)
HCT VFR BLD CALC: 43 % — SIGNIFICANT CHANGE UP (ref 39–50)
HCT VFR BLD CALC: 43.3 % — SIGNIFICANT CHANGE UP (ref 39–50)
HCT VFR BLD CALC: 44 % — SIGNIFICANT CHANGE UP (ref 39–50)
HCT VFR BLD CALC: 44.5 % — SIGNIFICANT CHANGE UP (ref 39–50)
HCT VFR BLD CALC: 44.8 % — SIGNIFICANT CHANGE UP (ref 39–50)
HCT VFR BLD CALC: 44.9 % — SIGNIFICANT CHANGE UP (ref 39–50)
HCT VFR BLD CALC: 45.2 % — SIGNIFICANT CHANGE UP (ref 39–50)
HCT VFR BLD CALC: 45.3 % — SIGNIFICANT CHANGE UP (ref 39–50)
HCT VFR BLD CALC: 46.2 % — SIGNIFICANT CHANGE UP (ref 39–50)
HCT VFR BLD CALC: 48.5 % — SIGNIFICANT CHANGE UP (ref 39–50)
HCT VFR BLD CALC: 61 % — CRITICAL HIGH (ref 39–50)
HCV AB S/CO SERPL IA: 0.14 S/CO — SIGNIFICANT CHANGE UP
HCV AB SERPL-IMP: SIGNIFICANT CHANGE UP
HDLC SERPL-MCNC: 19 MG/DL — LOW (ref 40–125)
HDLC SERPL-MCNC: 21 MG/DL — LOW (ref 40–125)
HGB BLD-MCNC: 10.5 G/DL — LOW (ref 13–17)
HGB BLD-MCNC: 10.8 G/DL — LOW (ref 13–17)
HGB BLD-MCNC: 11 G/DL — LOW (ref 13–17)
HGB BLD-MCNC: 11.1 G/DL — LOW (ref 13–17)
HGB BLD-MCNC: 11.2 G/DL — LOW (ref 13–17)
HGB BLD-MCNC: 11.3 G/DL — LOW (ref 13–17)
HGB BLD-MCNC: 11.4 G/DL — LOW (ref 13–17)
HGB BLD-MCNC: 11.4 G/DL — LOW (ref 13–17)
HGB BLD-MCNC: 11.5 G/DL — LOW (ref 13–17)
HGB BLD-MCNC: 11.6 G/DL — LOW (ref 13–17)
HGB BLD-MCNC: 11.7 G/DL — LOW (ref 13–17)
HGB BLD-MCNC: 11.7 G/DL — LOW (ref 13–17)
HGB BLD-MCNC: 11.9 G/DL — LOW (ref 13–17)
HGB BLD-MCNC: 12 G/DL — LOW (ref 13–17)
HGB BLD-MCNC: 12 G/DL — LOW (ref 13–17)
HGB BLD-MCNC: 12.2 G/DL — LOW (ref 13–17)
HGB BLD-MCNC: 12.5 G/DL — LOW (ref 13–17)
HGB BLD-MCNC: 12.6 G/DL — LOW (ref 13–17)
HGB BLD-MCNC: 12.6 G/DL — LOW (ref 13–17)
HGB BLD-MCNC: 12.7 G/DL — LOW (ref 13–17)
HGB BLD-MCNC: 12.9 G/DL — LOW (ref 13–17)
HGB BLD-MCNC: 12.9 G/DL — LOW (ref 13–17)
HGB BLD-MCNC: 13 G/DL — SIGNIFICANT CHANGE UP (ref 13–17)
HGB BLD-MCNC: 13.1 G/DL — SIGNIFICANT CHANGE UP (ref 13–17)
HGB BLD-MCNC: 13.3 G/DL — SIGNIFICANT CHANGE UP (ref 13–17)
HGB BLD-MCNC: 13.3 G/DL — SIGNIFICANT CHANGE UP (ref 13–17)
HGB BLD-MCNC: 13.5 G/DL — SIGNIFICANT CHANGE UP (ref 13–17)
HGB BLD-MCNC: 13.5 G/DL — SIGNIFICANT CHANGE UP (ref 13–17)
HGB BLD-MCNC: 13.8 G/DL — SIGNIFICANT CHANGE UP (ref 13–17)
HGB BLD-MCNC: 13.8 G/DL — SIGNIFICANT CHANGE UP (ref 13–17)
HGB BLD-MCNC: 13.9 G/DL — SIGNIFICANT CHANGE UP (ref 13–17)
HGB BLD-MCNC: 14 G/DL — SIGNIFICANT CHANGE UP (ref 13–17)
HGB BLD-MCNC: 14.9 G/DL — SIGNIFICANT CHANGE UP (ref 13–17)
HGB BLD-MCNC: 17.8 G/DL — HIGH (ref 13–17)
HOROWITZ INDEX BLDA+IHG-RTO: 32 — SIGNIFICANT CHANGE UP
HOROWITZ INDEX BLDA+IHG-RTO: 40 — SIGNIFICANT CHANGE UP
HOROWITZ INDEX BLDA+IHG-RTO: 60 — SIGNIFICANT CHANGE UP
HOROWITZ INDEX BLDA+IHG-RTO: 60 — SIGNIFICANT CHANGE UP
HOROWITZ INDEX BLDA+IHG-RTO: SIGNIFICANT CHANGE UP
HOROWITZ INDEX BLDV+IHG-RTO: 60 — SIGNIFICANT CHANGE UP
INR BLD: 1.22 RATIO — HIGH (ref 0.88–1.16)
INR BLD: 1.25 RATIO — HIGH (ref 0.88–1.16)
INR BLD: 1.25 RATIO — HIGH (ref 0.88–1.16)
INR BLD: 1.35 RATIO — HIGH (ref 0.88–1.16)
INR BLD: 1.36 RATIO — HIGH (ref 0.88–1.16)
INR BLD: 1.62 RATIO — HIGH (ref 0.88–1.16)
INR BLD: 1.75 RATIO — HIGH (ref 0.88–1.16)
INR BLD: 1.92 RATIO — HIGH (ref 0.88–1.16)
INR BLD: 2.09 RATIO — HIGH (ref 0.88–1.16)
INR BLD: 2.16 RATIO — HIGH (ref 0.88–1.16)
KETONES UR-MCNC: ABNORMAL
KETONES UR-MCNC: NEGATIVE — SIGNIFICANT CHANGE UP
LACTATE SERPL-SCNC: 1.6 MMOL/L — SIGNIFICANT CHANGE UP (ref 0.7–2)
LACTATE SERPL-SCNC: 1.7 MMOL/L — SIGNIFICANT CHANGE UP (ref 0.7–2)
LACTATE SERPL-SCNC: 16.2 MMOL/L — CRITICAL HIGH (ref 0.7–2)
LACTATE SERPL-SCNC: 4.8 MMOL/L — CRITICAL HIGH (ref 0.7–2)
LEUKOCYTE ESTERASE UR-ACNC: ABNORMAL
LEUKOCYTE ESTERASE UR-ACNC: NEGATIVE — SIGNIFICANT CHANGE UP
LIPID PNL WITH DIRECT LDL SERPL: 36 MG/DL — SIGNIFICANT CHANGE UP
LIPID PNL WITH DIRECT LDL SERPL: 36 MG/DL — SIGNIFICANT CHANGE UP
LYMPHOCYTES # BLD AUTO: 0.5 K/UL — LOW (ref 1–3.3)
LYMPHOCYTES # BLD AUTO: 0.6 K/UL — LOW (ref 1–3.3)
LYMPHOCYTES # BLD AUTO: 0.7 K/UL — LOW (ref 1–3.3)
LYMPHOCYTES # BLD AUTO: 0.7 K/UL — LOW (ref 1–3.3)
LYMPHOCYTES # BLD AUTO: 0.8 K/UL — LOW (ref 1–3.3)
LYMPHOCYTES # BLD AUTO: 0.9 K/UL — LOW (ref 1–3.3)
LYMPHOCYTES # BLD AUTO: 1 K/UL — SIGNIFICANT CHANGE UP (ref 1–3.3)
LYMPHOCYTES # BLD AUTO: 1 K/UL — SIGNIFICANT CHANGE UP (ref 1–3.3)
LYMPHOCYTES # BLD AUTO: 1.1 K/UL — SIGNIFICANT CHANGE UP (ref 1–3.3)
LYMPHOCYTES # BLD AUTO: 1.3 K/UL — SIGNIFICANT CHANGE UP (ref 1–3.3)
LYMPHOCYTES # BLD AUTO: 1.4 K/UL — SIGNIFICANT CHANGE UP (ref 1–3.3)
LYMPHOCYTES # BLD AUTO: 1.6 K/UL — SIGNIFICANT CHANGE UP (ref 1–3.3)
LYMPHOCYTES # BLD AUTO: 1.6 K/UL — SIGNIFICANT CHANGE UP (ref 1–3.3)
LYMPHOCYTES # BLD AUTO: 1.8 K/UL — SIGNIFICANT CHANGE UP (ref 1–3.3)
LYMPHOCYTES # BLD AUTO: 1.9 K/UL — SIGNIFICANT CHANGE UP (ref 1–3.3)
LYMPHOCYTES # BLD AUTO: 10.6 % — LOW (ref 13–44)
LYMPHOCYTES # BLD AUTO: 11.8 % — LOW (ref 13–44)
LYMPHOCYTES # BLD AUTO: 12 % — LOW (ref 13–44)
LYMPHOCYTES # BLD AUTO: 12.2 % — LOW (ref 13–44)
LYMPHOCYTES # BLD AUTO: 13 % — SIGNIFICANT CHANGE UP (ref 13–44)
LYMPHOCYTES # BLD AUTO: 14.5 % — SIGNIFICANT CHANGE UP (ref 13–44)
LYMPHOCYTES # BLD AUTO: 14.6 % — SIGNIFICANT CHANGE UP (ref 13–44)
LYMPHOCYTES # BLD AUTO: 2.6 K/UL — SIGNIFICANT CHANGE UP (ref 1–3.3)
LYMPHOCYTES # BLD AUTO: 20.3 % — SIGNIFICANT CHANGE UP (ref 13–44)
LYMPHOCYTES # BLD AUTO: 24.1 % — SIGNIFICANT CHANGE UP (ref 13–44)
LYMPHOCYTES # BLD AUTO: 25.2 % — SIGNIFICANT CHANGE UP (ref 13–44)
LYMPHOCYTES # BLD AUTO: 26 % — SIGNIFICANT CHANGE UP (ref 13–44)
LYMPHOCYTES # BLD AUTO: 26.2 % — SIGNIFICANT CHANGE UP (ref 13–44)
LYMPHOCYTES # BLD AUTO: 31.1 % — SIGNIFICANT CHANGE UP (ref 13–44)
LYMPHOCYTES # BLD AUTO: 32 % — SIGNIFICANT CHANGE UP (ref 13–44)
LYMPHOCYTES # BLD AUTO: 4.3 % — LOW (ref 13–44)
LYMPHOCYTES # BLD AUTO: 4.4 % — LOW (ref 13–44)
LYMPHOCYTES # BLD AUTO: 5.2 % — LOW (ref 13–44)
LYMPHOCYTES # BLD AUTO: 5.5 % — LOW (ref 13–44)
LYMPHOCYTES # BLD AUTO: 6 % — LOW (ref 13–44)
LYMPHOCYTES # BLD AUTO: 6 % — LOW (ref 13–44)
LYMPHOCYTES # BLD AUTO: 6.5 % — LOW (ref 13–44)
LYMPHOCYTES # BLD AUTO: 7.8 % — LOW (ref 13–44)
LYMPHOCYTES # BLD AUTO: 8.4 % — LOW (ref 13–44)
MAGNESIUM SERPL-MCNC: 1.4 MG/DL — LOW (ref 1.6–2.6)
MAGNESIUM SERPL-MCNC: 1.5 MG/DL — LOW (ref 1.6–2.6)
MAGNESIUM SERPL-MCNC: 1.6 MG/DL — SIGNIFICANT CHANGE UP (ref 1.6–2.6)
MAGNESIUM SERPL-MCNC: 1.7 MG/DL — SIGNIFICANT CHANGE UP (ref 1.6–2.6)
MAGNESIUM SERPL-MCNC: 1.7 MG/DL — SIGNIFICANT CHANGE UP (ref 1.6–2.6)
MAGNESIUM SERPL-MCNC: 1.8 MG/DL — SIGNIFICANT CHANGE UP (ref 1.6–2.6)
MAGNESIUM SERPL-MCNC: 2 MG/DL — SIGNIFICANT CHANGE UP (ref 1.6–2.6)
MAGNESIUM SERPL-MCNC: 2.2 MG/DL — SIGNIFICANT CHANGE UP (ref 1.6–2.6)
MAGNESIUM SERPL-MCNC: 2.3 MG/DL — SIGNIFICANT CHANGE UP (ref 1.6–2.6)
MAGNESIUM SERPL-MCNC: 2.5 MG/DL — SIGNIFICANT CHANGE UP (ref 1.6–2.6)
MAGNESIUM SERPL-MCNC: 2.6 MG/DL — SIGNIFICANT CHANGE UP (ref 1.6–2.6)
MAGNESIUM SERPL-MCNC: 2.7 MG/DL — HIGH (ref 1.6–2.6)
MAGNESIUM SERPL-MCNC: 2.7 MG/DL — HIGH (ref 1.6–2.6)
MCHC RBC-ENTMCNC: 28.6 GM/DL — LOW (ref 32–36)
MCHC RBC-ENTMCNC: 29 PG — SIGNIFICANT CHANGE UP (ref 27–34)
MCHC RBC-ENTMCNC: 29.2 PG — SIGNIFICANT CHANGE UP (ref 27–34)
MCHC RBC-ENTMCNC: 29.3 GM/DL — LOW (ref 32–36)
MCHC RBC-ENTMCNC: 29.3 PG — SIGNIFICANT CHANGE UP (ref 27–34)
MCHC RBC-ENTMCNC: 29.3 PG — SIGNIFICANT CHANGE UP (ref 27–34)
MCHC RBC-ENTMCNC: 29.4 PG — SIGNIFICANT CHANGE UP (ref 27–34)
MCHC RBC-ENTMCNC: 29.5 GM/DL — LOW (ref 32–36)
MCHC RBC-ENTMCNC: 29.5 PG — SIGNIFICANT CHANGE UP (ref 27–34)
MCHC RBC-ENTMCNC: 29.5 PG — SIGNIFICANT CHANGE UP (ref 27–34)
MCHC RBC-ENTMCNC: 29.6 GM/DL — LOW (ref 32–36)
MCHC RBC-ENTMCNC: 29.6 GM/DL — LOW (ref 32–36)
MCHC RBC-ENTMCNC: 29.6 PG — SIGNIFICANT CHANGE UP (ref 27–34)
MCHC RBC-ENTMCNC: 29.6 PG — SIGNIFICANT CHANGE UP (ref 27–34)
MCHC RBC-ENTMCNC: 29.7 PG — SIGNIFICANT CHANGE UP (ref 27–34)
MCHC RBC-ENTMCNC: 29.9 PG — SIGNIFICANT CHANGE UP (ref 27–34)
MCHC RBC-ENTMCNC: 30 GM/DL — LOW (ref 32–36)
MCHC RBC-ENTMCNC: 30 GM/DL — LOW (ref 32–36)
MCHC RBC-ENTMCNC: 30 PG — SIGNIFICANT CHANGE UP (ref 27–34)
MCHC RBC-ENTMCNC: 30.1 GM/DL — LOW (ref 32–36)
MCHC RBC-ENTMCNC: 30.1 GM/DL — LOW (ref 32–36)
MCHC RBC-ENTMCNC: 30.1 PG — SIGNIFICANT CHANGE UP (ref 27–34)
MCHC RBC-ENTMCNC: 30.2 GM/DL — LOW (ref 32–36)
MCHC RBC-ENTMCNC: 30.2 PG — SIGNIFICANT CHANGE UP (ref 27–34)
MCHC RBC-ENTMCNC: 30.3 GM/DL — LOW (ref 32–36)
MCHC RBC-ENTMCNC: 30.3 PG — SIGNIFICANT CHANGE UP (ref 27–34)
MCHC RBC-ENTMCNC: 30.3 PG — SIGNIFICANT CHANGE UP (ref 27–34)
MCHC RBC-ENTMCNC: 30.4 GM/DL — LOW (ref 32–36)
MCHC RBC-ENTMCNC: 30.4 GM/DL — LOW (ref 32–36)
MCHC RBC-ENTMCNC: 30.4 PG — SIGNIFICANT CHANGE UP (ref 27–34)
MCHC RBC-ENTMCNC: 30.6 PG — SIGNIFICANT CHANGE UP (ref 27–34)
MCHC RBC-ENTMCNC: 30.6 PG — SIGNIFICANT CHANGE UP (ref 27–34)
MCHC RBC-ENTMCNC: 30.7 GM/DL — LOW (ref 32–36)
MCHC RBC-ENTMCNC: 30.8 GM/DL — LOW (ref 32–36)
MCHC RBC-ENTMCNC: 30.8 PG — SIGNIFICANT CHANGE UP (ref 27–34)
MCHC RBC-ENTMCNC: 30.8 PG — SIGNIFICANT CHANGE UP (ref 27–34)
MCHC RBC-ENTMCNC: 30.9 GM/DL — LOW (ref 32–36)
MCHC RBC-ENTMCNC: 30.9 PG — SIGNIFICANT CHANGE UP (ref 27–34)
MCHC RBC-ENTMCNC: 31 GM/DL — LOW (ref 32–36)
MCHC RBC-ENTMCNC: 31 PG — SIGNIFICANT CHANGE UP (ref 27–34)
MCHC RBC-ENTMCNC: 31 PG — SIGNIFICANT CHANGE UP (ref 27–34)
MCHC RBC-ENTMCNC: 31.1 GM/DL — LOW (ref 32–36)
MCHC RBC-ENTMCNC: 31.1 GM/DL — LOW (ref 32–36)
MCHC RBC-ENTMCNC: 31.1 PG — SIGNIFICANT CHANGE UP (ref 27–34)
MCHC RBC-ENTMCNC: 31.1 PG — SIGNIFICANT CHANGE UP (ref 27–34)
MCHC RBC-ENTMCNC: 31.2 GM/DL — LOW (ref 32–36)
MCHC RBC-ENTMCNC: 31.2 GM/DL — LOW (ref 32–36)
MCHC RBC-ENTMCNC: 31.2 PG — SIGNIFICANT CHANGE UP (ref 27–34)
MCHC RBC-ENTMCNC: 31.3 PG — SIGNIFICANT CHANGE UP (ref 27–34)
MCHC RBC-ENTMCNC: 31.3 PG — SIGNIFICANT CHANGE UP (ref 27–34)
MCHC RBC-ENTMCNC: 31.4 GM/DL — LOW (ref 32–36)
MCHC RBC-ENTMCNC: 31.4 PG — SIGNIFICANT CHANGE UP (ref 27–34)
MCHC RBC-ENTMCNC: 31.4 PG — SIGNIFICANT CHANGE UP (ref 27–34)
MCHC RBC-ENTMCNC: 31.6 GM/DL — LOW (ref 32–36)
MCHC RBC-ENTMCNC: 31.7 PG — SIGNIFICANT CHANGE UP (ref 27–34)
MCHC RBC-ENTMCNC: 31.8 GM/DL — LOW (ref 32–36)
MCHC RBC-ENTMCNC: 31.9 GM/DL — LOW (ref 32–36)
MCHC RBC-ENTMCNC: 31.9 GM/DL — LOW (ref 32–36)
MCHC RBC-ENTMCNC: 32.1 GM/DL — SIGNIFICANT CHANGE UP (ref 32–36)
MCHC RBC-ENTMCNC: 32.2 GM/DL — SIGNIFICANT CHANGE UP (ref 32–36)
MCHC RBC-ENTMCNC: 33.2 GM/DL — SIGNIFICANT CHANGE UP (ref 32–36)
MCV RBC AUTO: 100.1 FL — HIGH (ref 80–100)
MCV RBC AUTO: 100.4 FL — HIGH (ref 80–100)
MCV RBC AUTO: 100.4 FL — HIGH (ref 80–100)
MCV RBC AUTO: 100.5 FL — HIGH (ref 80–100)
MCV RBC AUTO: 100.9 FL — HIGH (ref 80–100)
MCV RBC AUTO: 101.1 FL — HIGH (ref 80–100)
MCV RBC AUTO: 101.2 FL — HIGH (ref 80–100)
MCV RBC AUTO: 101.3 FL — HIGH (ref 80–100)
MCV RBC AUTO: 102.7 FL — HIGH (ref 80–100)
MCV RBC AUTO: 103.4 FL — HIGH (ref 80–100)
MCV RBC AUTO: 95.5 FL — SIGNIFICANT CHANGE UP (ref 80–100)
MCV RBC AUTO: 95.8 FL — SIGNIFICANT CHANGE UP (ref 80–100)
MCV RBC AUTO: 96.3 FL — SIGNIFICANT CHANGE UP (ref 80–100)
MCV RBC AUTO: 96.4 FL — SIGNIFICANT CHANGE UP (ref 80–100)
MCV RBC AUTO: 97 FL — SIGNIFICANT CHANGE UP (ref 80–100)
MCV RBC AUTO: 97.2 FL — SIGNIFICANT CHANGE UP (ref 80–100)
MCV RBC AUTO: 97.3 FL — SIGNIFICANT CHANGE UP (ref 80–100)
MCV RBC AUTO: 97.4 FL — SIGNIFICANT CHANGE UP (ref 80–100)
MCV RBC AUTO: 97.6 FL — SIGNIFICANT CHANGE UP (ref 80–100)
MCV RBC AUTO: 97.6 FL — SIGNIFICANT CHANGE UP (ref 80–100)
MCV RBC AUTO: 97.8 FL — SIGNIFICANT CHANGE UP (ref 80–100)
MCV RBC AUTO: 97.8 FL — SIGNIFICANT CHANGE UP (ref 80–100)
MCV RBC AUTO: 97.9 FL — SIGNIFICANT CHANGE UP (ref 80–100)
MCV RBC AUTO: 98 FL — SIGNIFICANT CHANGE UP (ref 80–100)
MCV RBC AUTO: 98.2 FL — SIGNIFICANT CHANGE UP (ref 80–100)
MCV RBC AUTO: 98.3 FL — SIGNIFICANT CHANGE UP (ref 80–100)
MCV RBC AUTO: 98.4 FL — SIGNIFICANT CHANGE UP (ref 80–100)
MCV RBC AUTO: 98.6 FL — SIGNIFICANT CHANGE UP (ref 80–100)
MCV RBC AUTO: 98.6 FL — SIGNIFICANT CHANGE UP (ref 80–100)
MCV RBC AUTO: 99 FL — SIGNIFICANT CHANGE UP (ref 80–100)
MCV RBC AUTO: 99 FL — SIGNIFICANT CHANGE UP (ref 80–100)
MCV RBC AUTO: 99.1 FL — SIGNIFICANT CHANGE UP (ref 80–100)
MCV RBC AUTO: 99.2 FL — SIGNIFICANT CHANGE UP (ref 80–100)
MCV RBC AUTO: 99.3 FL — SIGNIFICANT CHANGE UP (ref 80–100)
MCV RBC AUTO: 99.5 FL — SIGNIFICANT CHANGE UP (ref 80–100)
MCV RBC AUTO: 99.6 FL — SIGNIFICANT CHANGE UP (ref 80–100)
MONOCYTES # BLD AUTO: 0.3 K/UL — SIGNIFICANT CHANGE UP (ref 0–0.9)
MONOCYTES # BLD AUTO: 0.4 K/UL — SIGNIFICANT CHANGE UP (ref 0–0.9)
MONOCYTES # BLD AUTO: 0.5 K/UL — SIGNIFICANT CHANGE UP (ref 0–0.9)
MONOCYTES # BLD AUTO: 0.6 K/UL — SIGNIFICANT CHANGE UP (ref 0–0.9)
MONOCYTES # BLD AUTO: 0.7 K/UL — SIGNIFICANT CHANGE UP (ref 0–0.9)
MONOCYTES # BLD AUTO: 0.7 K/UL — SIGNIFICANT CHANGE UP (ref 0–0.9)
MONOCYTES # BLD AUTO: 0.8 K/UL — SIGNIFICANT CHANGE UP (ref 0–0.9)
MONOCYTES # BLD AUTO: 0.9 K/UL — SIGNIFICANT CHANGE UP (ref 0–0.9)
MONOCYTES # BLD AUTO: 0.9 K/UL — SIGNIFICANT CHANGE UP (ref 0–0.9)
MONOCYTES NFR BLD AUTO: 13.8 % — SIGNIFICANT CHANGE UP (ref 2–14)
MONOCYTES NFR BLD AUTO: 3.3 % — SIGNIFICANT CHANGE UP (ref 2–14)
MONOCYTES NFR BLD AUTO: 3.7 % — SIGNIFICANT CHANGE UP (ref 2–14)
MONOCYTES NFR BLD AUTO: 3.8 % — SIGNIFICANT CHANGE UP (ref 2–14)
MONOCYTES NFR BLD AUTO: 4 % — SIGNIFICANT CHANGE UP (ref 2–14)
MONOCYTES NFR BLD AUTO: 4 % — SIGNIFICANT CHANGE UP (ref 2–14)
MONOCYTES NFR BLD AUTO: 4.3 % — SIGNIFICANT CHANGE UP (ref 2–14)
MONOCYTES NFR BLD AUTO: 4.4 % — SIGNIFICANT CHANGE UP (ref 2–14)
MONOCYTES NFR BLD AUTO: 4.7 % — SIGNIFICANT CHANGE UP (ref 2–14)
MONOCYTES NFR BLD AUTO: 5.4 % — SIGNIFICANT CHANGE UP (ref 2–14)
MONOCYTES NFR BLD AUTO: 5.4 % — SIGNIFICANT CHANGE UP (ref 2–14)
MONOCYTES NFR BLD AUTO: 5.6 % — SIGNIFICANT CHANGE UP (ref 2–14)
MONOCYTES NFR BLD AUTO: 5.8 % — SIGNIFICANT CHANGE UP (ref 2–14)
MONOCYTES NFR BLD AUTO: 6.7 % — SIGNIFICANT CHANGE UP (ref 2–14)
MONOCYTES NFR BLD AUTO: 7.1 % — SIGNIFICANT CHANGE UP (ref 2–14)
MONOCYTES NFR BLD AUTO: 7.5 % — SIGNIFICANT CHANGE UP (ref 2–14)
MONOCYTES NFR BLD AUTO: 7.6 % — SIGNIFICANT CHANGE UP (ref 2–14)
MONOCYTES NFR BLD AUTO: 7.9 % — SIGNIFICANT CHANGE UP (ref 2–14)
MONOCYTES NFR BLD AUTO: 8.4 % — SIGNIFICANT CHANGE UP (ref 2–14)
MONOCYTES NFR BLD AUTO: 8.8 % — SIGNIFICANT CHANGE UP (ref 2–14)
MONOCYTES NFR BLD AUTO: 9.3 % — SIGNIFICANT CHANGE UP (ref 2–14)
MONOCYTES NFR BLD AUTO: 9.4 % — SIGNIFICANT CHANGE UP (ref 2–14)
MONOCYTES NFR BLD AUTO: 9.4 % — SIGNIFICANT CHANGE UP (ref 2–14)
NEUTROPHILS # BLD AUTO: 12.3 K/UL — HIGH (ref 1.8–7.4)
NEUTROPHILS # BLD AUTO: 14.5 K/UL — HIGH (ref 1.8–7.4)
NEUTROPHILS # BLD AUTO: 16.4 K/UL — HIGH (ref 1.8–7.4)
NEUTROPHILS # BLD AUTO: 19.6 K/UL — HIGH (ref 1.8–7.4)
NEUTROPHILS # BLD AUTO: 2.4 K/UL — SIGNIFICANT CHANGE UP (ref 1.8–7.4)
NEUTROPHILS # BLD AUTO: 3.2 K/UL — SIGNIFICANT CHANGE UP (ref 1.8–7.4)
NEUTROPHILS # BLD AUTO: 3.3 K/UL — SIGNIFICANT CHANGE UP (ref 1.8–7.4)
NEUTROPHILS # BLD AUTO: 3.4 K/UL — SIGNIFICANT CHANGE UP (ref 1.8–7.4)
NEUTROPHILS # BLD AUTO: 3.5 K/UL — SIGNIFICANT CHANGE UP (ref 1.8–7.4)
NEUTROPHILS # BLD AUTO: 3.5 K/UL — SIGNIFICANT CHANGE UP (ref 1.8–7.4)
NEUTROPHILS # BLD AUTO: 3.7 K/UL — SIGNIFICANT CHANGE UP (ref 1.8–7.4)
NEUTROPHILS # BLD AUTO: 3.9 K/UL — SIGNIFICANT CHANGE UP (ref 1.8–7.4)
NEUTROPHILS # BLD AUTO: 4.2 K/UL — SIGNIFICANT CHANGE UP (ref 1.8–7.4)
NEUTROPHILS # BLD AUTO: 4.8 K/UL — SIGNIFICANT CHANGE UP (ref 1.8–7.4)
NEUTROPHILS # BLD AUTO: 6.4 K/UL — SIGNIFICANT CHANGE UP (ref 1.8–7.4)
NEUTROPHILS # BLD AUTO: 7.4 K/UL — SIGNIFICANT CHANGE UP (ref 1.8–7.4)
NEUTROPHILS # BLD AUTO: 8.6 K/UL — HIGH (ref 1.8–7.4)
NEUTROPHILS # BLD AUTO: 9.1 K/UL — HIGH (ref 1.8–7.4)
NEUTROPHILS # BLD AUTO: 9.2 K/UL — HIGH (ref 1.8–7.4)
NEUTROPHILS # BLD AUTO: 9.2 K/UL — HIGH (ref 1.8–7.4)
NEUTROPHILS # BLD AUTO: 9.5 K/UL — HIGH (ref 1.8–7.4)
NEUTROPHILS # BLD AUTO: 9.8 K/UL — HIGH (ref 1.8–7.4)
NEUTROPHILS NFR BLD AUTO: 56.2 % — SIGNIFICANT CHANGE UP (ref 43–77)
NEUTROPHILS NFR BLD AUTO: 58.3 % — SIGNIFICANT CHANGE UP (ref 43–77)
NEUTROPHILS NFR BLD AUTO: 60.6 % — SIGNIFICANT CHANGE UP (ref 43–77)
NEUTROPHILS NFR BLD AUTO: 63.1 % — SIGNIFICANT CHANGE UP (ref 43–77)
NEUTROPHILS NFR BLD AUTO: 63.2 % — SIGNIFICANT CHANGE UP (ref 43–77)
NEUTROPHILS NFR BLD AUTO: 64.8 % — SIGNIFICANT CHANGE UP (ref 43–77)
NEUTROPHILS NFR BLD AUTO: 67.1 % — SIGNIFICANT CHANGE UP (ref 43–77)
NEUTROPHILS NFR BLD AUTO: 69.9 % — SIGNIFICANT CHANGE UP (ref 43–77)
NEUTROPHILS NFR BLD AUTO: 71.8 % — SIGNIFICANT CHANGE UP (ref 43–77)
NEUTROPHILS NFR BLD AUTO: 76.1 % — SIGNIFICANT CHANGE UP (ref 43–77)
NEUTROPHILS NFR BLD AUTO: 78.7 % — HIGH (ref 43–77)
NEUTROPHILS NFR BLD AUTO: 79 % — HIGH (ref 43–77)
NEUTROPHILS NFR BLD AUTO: 81.5 % — HIGH (ref 43–77)
NEUTROPHILS NFR BLD AUTO: 83.1 % — HIGH (ref 43–77)
NEUTROPHILS NFR BLD AUTO: 85.7 % — HIGH (ref 43–77)
NEUTROPHILS NFR BLD AUTO: 86.5 % — HIGH (ref 43–77)
NEUTROPHILS NFR BLD AUTO: 86.5 % — HIGH (ref 43–77)
NEUTROPHILS NFR BLD AUTO: 86.6 % — HIGH (ref 43–77)
NEUTROPHILS NFR BLD AUTO: 89.2 % — HIGH (ref 43–77)
NEUTROPHILS NFR BLD AUTO: 89.5 % — HIGH (ref 43–77)
NEUTROPHILS NFR BLD AUTO: 89.9 % — HIGH (ref 43–77)
NEUTROPHILS NFR BLD AUTO: 90.4 % — HIGH (ref 43–77)
NEUTROPHILS NFR BLD AUTO: 91.1 % — HIGH (ref 43–77)
NEUTROPHILS NFR BLD AUTO: SIGNIFICANT CHANGE UP % (ref 43–77)
NITRITE UR-MCNC: NEGATIVE — SIGNIFICANT CHANGE UP
NITRITE UR-MCNC: NEGATIVE — SIGNIFICANT CHANGE UP
OB PNL STL: POSITIVE
OSMOLALITY SERPL: 315 MOS/KG — HIGH (ref 275–300)
OSMOLALITY UR: 417 MOS/KG — SIGNIFICANT CHANGE UP (ref 50–1200)
OSMOLALITY UR: 424 MOS/KG — SIGNIFICANT CHANGE UP (ref 50–1200)
OSMOLALITY UR: 523 MOS/KG — SIGNIFICANT CHANGE UP (ref 50–1200)
OSMOLALITY UR: 544 MOS/KG — SIGNIFICANT CHANGE UP (ref 50–1200)
OSMOLALITY UR: 553 MOS/KG — SIGNIFICANT CHANGE UP (ref 50–1200)
PCO2 BLDA: 18 MMHG — LOW (ref 32–46)
PCO2 BLDA: 27 MMHG — LOW (ref 32–46)
PCO2 BLDA: 28 MMHG — LOW (ref 32–46)
PCO2 BLDA: 31 MMHG — LOW (ref 32–46)
PCO2 BLDA: 33 MMHG — SIGNIFICANT CHANGE UP (ref 32–46)
PCO2 BLDA: 33 MMHG — SIGNIFICANT CHANGE UP (ref 32–46)
PCO2 BLDA: 36 MMHG — SIGNIFICANT CHANGE UP (ref 32–46)
PCO2 BLDV: 43 MMHG — SIGNIFICANT CHANGE UP (ref 35–50)
PCP SPEC-MCNC: SIGNIFICANT CHANGE UP
PH BLDA: 7.28 — LOW (ref 7.35–7.45)
PH BLDA: 7.3 — LOW (ref 7.35–7.45)
PH BLDA: 7.38 — SIGNIFICANT CHANGE UP (ref 7.35–7.45)
PH BLDA: 7.38 — SIGNIFICANT CHANGE UP (ref 7.35–7.45)
PH BLDA: 7.4 — SIGNIFICANT CHANGE UP (ref 7.35–7.45)
PH BLDA: 7.41 — SIGNIFICANT CHANGE UP (ref 7.35–7.45)
PH BLDA: 7.45 — SIGNIFICANT CHANGE UP (ref 7.35–7.45)
PH BLDV: 7.23 — LOW (ref 7.35–7.45)
PH UR: 5 — SIGNIFICANT CHANGE UP (ref 5–8)
PH UR: 6 — SIGNIFICANT CHANGE UP (ref 5–8)
PHOSPHATE SERPL-MCNC: 2 MG/DL — LOW (ref 2.5–4.5)
PHOSPHATE SERPL-MCNC: 2.2 MG/DL — LOW (ref 2.5–4.5)
PHOSPHATE SERPL-MCNC: 2.3 MG/DL — LOW (ref 2.5–4.5)
PHOSPHATE SERPL-MCNC: 2.3 MG/DL — LOW (ref 2.5–4.5)
PHOSPHATE SERPL-MCNC: 2.4 MG/DL — LOW (ref 2.5–4.5)
PHOSPHATE SERPL-MCNC: 2.5 MG/DL — SIGNIFICANT CHANGE UP (ref 2.5–4.5)
PHOSPHATE SERPL-MCNC: 2.6 MG/DL — SIGNIFICANT CHANGE UP (ref 2.5–4.5)
PHOSPHATE SERPL-MCNC: 2.7 MG/DL — SIGNIFICANT CHANGE UP (ref 2.5–4.5)
PHOSPHATE SERPL-MCNC: 2.8 MG/DL — SIGNIFICANT CHANGE UP (ref 2.5–4.5)
PHOSPHATE SERPL-MCNC: 2.8 MG/DL — SIGNIFICANT CHANGE UP (ref 2.5–4.5)
PHOSPHATE SERPL-MCNC: 3 MG/DL — SIGNIFICANT CHANGE UP (ref 2.5–4.5)
PHOSPHATE SERPL-MCNC: 3.4 MG/DL — SIGNIFICANT CHANGE UP (ref 2.5–4.5)
PHOSPHATE SERPL-MCNC: 3.5 MG/DL — SIGNIFICANT CHANGE UP (ref 2.5–4.5)
PHOSPHATE SERPL-MCNC: 3.5 MG/DL — SIGNIFICANT CHANGE UP (ref 2.5–4.5)
PHOSPHATE SERPL-MCNC: 4.1 MG/DL — SIGNIFICANT CHANGE UP (ref 2.5–4.5)
PHOSPHATE SERPL-MCNC: 4.1 MG/DL — SIGNIFICANT CHANGE UP (ref 2.5–4.5)
PHOSPHATE SERPL-MCNC: 4.9 MG/DL — HIGH (ref 2.5–4.5)
PHOSPHATE SERPL-MCNC: 5 MG/DL — HIGH (ref 2.5–4.5)
PHOSPHATE SERPL-MCNC: 6 MG/DL — HIGH (ref 2.5–4.5)
PLATELET # BLD AUTO: 103 K/UL — LOW (ref 150–400)
PLATELET # BLD AUTO: 107 K/UL — LOW (ref 150–400)
PLATELET # BLD AUTO: 108 K/UL — LOW (ref 150–400)
PLATELET # BLD AUTO: 114 K/UL — LOW (ref 150–400)
PLATELET # BLD AUTO: 114 K/UL — LOW (ref 150–400)
PLATELET # BLD AUTO: 119 K/UL — LOW (ref 150–400)
PLATELET # BLD AUTO: 120 K/UL — LOW (ref 150–400)
PLATELET # BLD AUTO: 121 K/UL — LOW (ref 150–400)
PLATELET # BLD AUTO: 122 K/UL — LOW (ref 150–400)
PLATELET # BLD AUTO: 124 K/UL — LOW (ref 150–400)
PLATELET # BLD AUTO: 128 K/UL — LOW (ref 150–400)
PLATELET # BLD AUTO: 129 K/UL — LOW (ref 150–400)
PLATELET # BLD AUTO: 130 K/UL — LOW (ref 150–400)
PLATELET # BLD AUTO: 140 K/UL — LOW (ref 150–400)
PLATELET # BLD AUTO: 144 K/UL — LOW (ref 150–400)
PLATELET # BLD AUTO: 147 K/UL — LOW (ref 150–400)
PLATELET # BLD AUTO: 150 K/UL — SIGNIFICANT CHANGE UP (ref 150–400)
PLATELET # BLD AUTO: 158 K/UL — SIGNIFICANT CHANGE UP (ref 150–400)
PLATELET # BLD AUTO: 158 K/UL — SIGNIFICANT CHANGE UP (ref 150–400)
PLATELET # BLD AUTO: 159 K/UL — SIGNIFICANT CHANGE UP (ref 150–400)
PLATELET # BLD AUTO: 161 K/UL — SIGNIFICANT CHANGE UP (ref 150–400)
PLATELET # BLD AUTO: 163 K/UL — SIGNIFICANT CHANGE UP (ref 150–400)
PLATELET # BLD AUTO: 167 K/UL — SIGNIFICANT CHANGE UP (ref 150–400)
PLATELET # BLD AUTO: 171 K/UL — SIGNIFICANT CHANGE UP (ref 150–400)
PLATELET # BLD AUTO: 177 K/UL — SIGNIFICANT CHANGE UP (ref 150–400)
PLATELET # BLD AUTO: 179 K/UL — SIGNIFICANT CHANGE UP (ref 150–400)
PLATELET # BLD AUTO: 181 K/UL — SIGNIFICANT CHANGE UP (ref 150–400)
PLATELET # BLD AUTO: 187 K/UL — SIGNIFICANT CHANGE UP (ref 150–400)
PLATELET # BLD AUTO: 194 K/UL — SIGNIFICANT CHANGE UP (ref 150–400)
PLATELET # BLD AUTO: 200 K/UL — SIGNIFICANT CHANGE UP (ref 150–400)
PLATELET # BLD AUTO: 208 K/UL — SIGNIFICANT CHANGE UP (ref 150–400)
PLATELET # BLD AUTO: 209 K/UL — SIGNIFICANT CHANGE UP (ref 150–400)
PLATELET # BLD AUTO: 262 K/UL — SIGNIFICANT CHANGE UP (ref 150–400)
PLATELET # BLD AUTO: 294 K/UL — SIGNIFICANT CHANGE UP (ref 150–400)
PO2 BLDA: 108 MMHG — SIGNIFICANT CHANGE UP (ref 74–108)
PO2 BLDA: 146 MMHG — HIGH (ref 74–108)
PO2 BLDA: 164 MMHG — HIGH (ref 74–108)
PO2 BLDA: 210 MMHG — HIGH (ref 74–108)
PO2 BLDA: 262 MMHG — HIGH (ref 74–108)
PO2 BLDA: 92 MMHG — SIGNIFICANT CHANGE UP (ref 74–108)
PO2 BLDA: 95 MMHG — SIGNIFICANT CHANGE UP (ref 74–108)
PO2 BLDV: 51 MMHG — HIGH (ref 25–45)
POTASSIUM SERPL-MCNC: 3 MMOL/L — LOW (ref 3.5–5.3)
POTASSIUM SERPL-MCNC: 3.1 MMOL/L — LOW (ref 3.5–5.3)
POTASSIUM SERPL-MCNC: 3.2 MMOL/L — LOW (ref 3.5–5.3)
POTASSIUM SERPL-MCNC: 3.3 MMOL/L — LOW (ref 3.5–5.3)
POTASSIUM SERPL-MCNC: 3.4 MMOL/L — LOW (ref 3.5–5.3)
POTASSIUM SERPL-MCNC: 3.4 MMOL/L — LOW (ref 3.5–5.3)
POTASSIUM SERPL-MCNC: 3.5 MMOL/L — SIGNIFICANT CHANGE UP (ref 3.5–5.3)
POTASSIUM SERPL-MCNC: 3.5 MMOL/L — SIGNIFICANT CHANGE UP (ref 3.5–5.3)
POTASSIUM SERPL-MCNC: 3.7 MMOL/L — SIGNIFICANT CHANGE UP (ref 3.5–5.3)
POTASSIUM SERPL-MCNC: 3.9 MMOL/L — SIGNIFICANT CHANGE UP (ref 3.5–5.3)
POTASSIUM SERPL-MCNC: 3.9 MMOL/L — SIGNIFICANT CHANGE UP (ref 3.5–5.3)
POTASSIUM SERPL-MCNC: 4.1 MMOL/L — SIGNIFICANT CHANGE UP (ref 3.5–5.3)
POTASSIUM SERPL-MCNC: 4.1 MMOL/L — SIGNIFICANT CHANGE UP (ref 3.5–5.3)
POTASSIUM SERPL-MCNC: 4.2 MMOL/L — SIGNIFICANT CHANGE UP (ref 3.5–5.3)
POTASSIUM SERPL-MCNC: 4.2 MMOL/L — SIGNIFICANT CHANGE UP (ref 3.5–5.3)
POTASSIUM SERPL-MCNC: 4.3 MMOL/L — SIGNIFICANT CHANGE UP (ref 3.5–5.3)
POTASSIUM SERPL-MCNC: 4.4 MMOL/L — SIGNIFICANT CHANGE UP (ref 3.5–5.3)
POTASSIUM SERPL-MCNC: 4.5 MMOL/L — SIGNIFICANT CHANGE UP (ref 3.5–5.3)
POTASSIUM SERPL-MCNC: 4.6 MMOL/L — SIGNIFICANT CHANGE UP (ref 3.5–5.3)
POTASSIUM SERPL-MCNC: 4.7 MMOL/L — SIGNIFICANT CHANGE UP (ref 3.5–5.3)
POTASSIUM SERPL-MCNC: 4.7 MMOL/L — SIGNIFICANT CHANGE UP (ref 3.5–5.3)
POTASSIUM SERPL-MCNC: 5.9 MMOL/L — HIGH (ref 3.5–5.3)
POTASSIUM SERPL-MCNC: SIGNIFICANT CHANGE UP MMOL/L (ref 3.5–5.3)
POTASSIUM SERPL-SCNC: 3 MMOL/L — LOW (ref 3.5–5.3)
POTASSIUM SERPL-SCNC: 3.1 MMOL/L — LOW (ref 3.5–5.3)
POTASSIUM SERPL-SCNC: 3.2 MMOL/L — LOW (ref 3.5–5.3)
POTASSIUM SERPL-SCNC: 3.3 MMOL/L — LOW (ref 3.5–5.3)
POTASSIUM SERPL-SCNC: 3.4 MMOL/L — LOW (ref 3.5–5.3)
POTASSIUM SERPL-SCNC: 3.4 MMOL/L — LOW (ref 3.5–5.3)
POTASSIUM SERPL-SCNC: 3.5 MMOL/L — SIGNIFICANT CHANGE UP (ref 3.5–5.3)
POTASSIUM SERPL-SCNC: 3.5 MMOL/L — SIGNIFICANT CHANGE UP (ref 3.5–5.3)
POTASSIUM SERPL-SCNC: 3.7 MMOL/L — SIGNIFICANT CHANGE UP (ref 3.5–5.3)
POTASSIUM SERPL-SCNC: 3.9 MMOL/L — SIGNIFICANT CHANGE UP (ref 3.5–5.3)
POTASSIUM SERPL-SCNC: 3.9 MMOL/L — SIGNIFICANT CHANGE UP (ref 3.5–5.3)
POTASSIUM SERPL-SCNC: 4.1 MMOL/L — SIGNIFICANT CHANGE UP (ref 3.5–5.3)
POTASSIUM SERPL-SCNC: 4.1 MMOL/L — SIGNIFICANT CHANGE UP (ref 3.5–5.3)
POTASSIUM SERPL-SCNC: 4.2 MMOL/L — SIGNIFICANT CHANGE UP (ref 3.5–5.3)
POTASSIUM SERPL-SCNC: 4.2 MMOL/L — SIGNIFICANT CHANGE UP (ref 3.5–5.3)
POTASSIUM SERPL-SCNC: 4.3 MMOL/L — SIGNIFICANT CHANGE UP (ref 3.5–5.3)
POTASSIUM SERPL-SCNC: 4.4 MMOL/L — SIGNIFICANT CHANGE UP (ref 3.5–5.3)
POTASSIUM SERPL-SCNC: 4.5 MMOL/L — SIGNIFICANT CHANGE UP (ref 3.5–5.3)
POTASSIUM SERPL-SCNC: 4.6 MMOL/L — SIGNIFICANT CHANGE UP (ref 3.5–5.3)
POTASSIUM SERPL-SCNC: 4.7 MMOL/L — SIGNIFICANT CHANGE UP (ref 3.5–5.3)
POTASSIUM SERPL-SCNC: 4.7 MMOL/L — SIGNIFICANT CHANGE UP (ref 3.5–5.3)
POTASSIUM SERPL-SCNC: 5.9 MMOL/L — HIGH (ref 3.5–5.3)
POTASSIUM SERPL-SCNC: SIGNIFICANT CHANGE UP MMOL/L (ref 3.5–5.3)
POTASSIUM UR-SCNC: 19 MMOL/L — LOW (ref 25–125)
POTASSIUM UR-SCNC: 24 MMOL/L — LOW (ref 25–125)
POTASSIUM UR-SCNC: 25 MMOL/L — SIGNIFICANT CHANGE UP (ref 25–125)
POTASSIUM UR-SCNC: 54 MMOL/L — SIGNIFICANT CHANGE UP (ref 25–125)
PROCALCITONIN SERPL-MCNC: 6.13 NG/ML — HIGH (ref 0–0.04)
PROLACTIN SERPL-MCNC: 78.2 NG/ML — HIGH (ref 4.1–18.4)
PROT ?TM UR-MCNC: 154 MG/DL — HIGH (ref 0–12)
PROT SERPL-MCNC: 5.4 G/DL — LOW (ref 6–8.3)
PROT SERPL-MCNC: 5.4 G/DL — LOW (ref 6–8.3)
PROT SERPL-MCNC: 5.8 G/DL — LOW (ref 6–8.3)
PROT SERPL-MCNC: 5.8 G/DL — LOW (ref 6–8.3)
PROT SERPL-MCNC: 5.9 G/DL — LOW (ref 6–8.3)
PROT SERPL-MCNC: 6 G/DL — SIGNIFICANT CHANGE UP (ref 6–8.3)
PROT SERPL-MCNC: 6 G/DL — SIGNIFICANT CHANGE UP (ref 6–8.3)
PROT SERPL-MCNC: 6.1 G/DL — SIGNIFICANT CHANGE UP (ref 6–8.3)
PROT SERPL-MCNC: 6.2 G/DL — SIGNIFICANT CHANGE UP (ref 6–8.3)
PROT SERPL-MCNC: 6.2 G/DL — SIGNIFICANT CHANGE UP (ref 6–8.3)
PROT SERPL-MCNC: 6.4 G/DL — SIGNIFICANT CHANGE UP (ref 6–8.3)
PROT SERPL-MCNC: 6.5 G/DL — SIGNIFICANT CHANGE UP (ref 6–8.3)
PROT SERPL-MCNC: 8 G/DL — SIGNIFICANT CHANGE UP (ref 6–8.3)
PROT UR-MCNC: 100
PROT UR-MCNC: 15
PROTHROM AB SERPL-ACNC: 13.4 SEC — HIGH (ref 9.8–12.7)
PROTHROM AB SERPL-ACNC: 13.7 SEC — HIGH (ref 9.8–12.7)
PROTHROM AB SERPL-ACNC: 13.7 SEC — HIGH (ref 9.8–12.7)
PROTHROM AB SERPL-ACNC: 14.8 SEC — HIGH (ref 9.8–12.7)
PROTHROM AB SERPL-ACNC: 14.9 SEC — HIGH (ref 9.8–12.7)
PROTHROM AB SERPL-ACNC: 17.8 SEC — HIGH (ref 9.8–12.7)
PROTHROM AB SERPL-ACNC: 19.3 SEC — HIGH (ref 9.8–12.7)
PROTHROM AB SERPL-ACNC: 21.2 SEC — HIGH (ref 9.8–12.7)
PROTHROM AB SERPL-ACNC: 23.1 SEC — HIGH (ref 9.8–12.7)
PROTHROM AB SERPL-ACNC: 23.9 SEC — HIGH (ref 9.8–12.7)
RAPID RVP RESULT: DETECTED
RAPID RVP RESULT: SIGNIFICANT CHANGE UP
RBC # BLD: 3.61 M/UL — LOW (ref 4.2–5.8)
RBC # BLD: 3.62 M/UL — LOW (ref 4.2–5.8)
RBC # BLD: 3.66 M/UL — LOW (ref 4.2–5.8)
RBC # BLD: 3.67 M/UL — LOW (ref 4.2–5.8)
RBC # BLD: 3.67 M/UL — LOW (ref 4.2–5.8)
RBC # BLD: 3.7 M/UL — LOW (ref 4.2–5.8)
RBC # BLD: 3.7 M/UL — LOW (ref 4.2–5.8)
RBC # BLD: 3.73 M/UL — LOW (ref 4.2–5.8)
RBC # BLD: 3.75 M/UL — LOW (ref 4.2–5.8)
RBC # BLD: 3.78 M/UL — LOW (ref 4.2–5.8)
RBC # BLD: 3.82 M/UL — LOW (ref 4.2–5.8)
RBC # BLD: 3.83 M/UL — LOW (ref 4.2–5.8)
RBC # BLD: 3.84 M/UL — LOW (ref 4.2–5.8)
RBC # BLD: 3.9 M/UL — LOW (ref 4.2–5.8)
RBC # BLD: 3.9 M/UL — LOW (ref 4.2–5.8)
RBC # BLD: 3.91 M/UL — LOW (ref 4.2–5.8)
RBC # BLD: 3.95 M/UL — LOW (ref 4.2–5.8)
RBC # BLD: 3.98 M/UL — LOW (ref 4.2–5.8)
RBC # BLD: 4.01 M/UL — LOW (ref 4.2–5.8)
RBC # BLD: 4.14 M/UL — LOW (ref 4.2–5.8)
RBC # BLD: 4.23 M/UL — SIGNIFICANT CHANGE UP (ref 4.2–5.8)
RBC # BLD: 4.25 M/UL — SIGNIFICANT CHANGE UP (ref 4.2–5.8)
RBC # BLD: 4.26 M/UL — SIGNIFICANT CHANGE UP (ref 4.2–5.8)
RBC # BLD: 4.32 M/UL — SIGNIFICANT CHANGE UP (ref 4.2–5.8)
RBC # BLD: 4.35 M/UL — SIGNIFICANT CHANGE UP (ref 4.2–5.8)
RBC # BLD: 4.36 M/UL — SIGNIFICANT CHANGE UP (ref 4.2–5.8)
RBC # BLD: 4.42 M/UL — SIGNIFICANT CHANGE UP (ref 4.2–5.8)
RBC # BLD: 4.43 M/UL — SIGNIFICANT CHANGE UP (ref 4.2–5.8)
RBC # BLD: 4.44 M/UL — SIGNIFICANT CHANGE UP (ref 4.2–5.8)
RBC # BLD: 4.46 M/UL — SIGNIFICANT CHANGE UP (ref 4.2–5.8)
RBC # BLD: 4.52 M/UL — SIGNIFICANT CHANGE UP (ref 4.2–5.8)
RBC # BLD: 4.53 M/UL — SIGNIFICANT CHANGE UP (ref 4.2–5.8)
RBC # BLD: 4.58 M/UL — SIGNIFICANT CHANGE UP (ref 4.2–5.8)
RBC # BLD: 4.62 M/UL — SIGNIFICANT CHANGE UP (ref 4.2–5.8)
RBC # BLD: 4.82 M/UL — SIGNIFICANT CHANGE UP (ref 4.2–5.8)
RBC # BLD: 5.93 M/UL — HIGH (ref 4.2–5.8)
RBC # FLD: 13.2 % — SIGNIFICANT CHANGE UP (ref 10.3–14.5)
RBC # FLD: 13.4 % — SIGNIFICANT CHANGE UP (ref 10.3–14.5)
RBC # FLD: 13.5 % — SIGNIFICANT CHANGE UP (ref 10.3–14.5)
RBC # FLD: 13.6 % — SIGNIFICANT CHANGE UP (ref 10.3–14.5)
RBC # FLD: 13.7 % — SIGNIFICANT CHANGE UP (ref 10.3–14.5)
RBC # FLD: 13.8 % — SIGNIFICANT CHANGE UP (ref 10.3–14.5)
RBC # FLD: 13.8 % — SIGNIFICANT CHANGE UP (ref 10.3–14.5)
RBC # FLD: 13.9 % — SIGNIFICANT CHANGE UP (ref 10.3–14.5)
RBC # FLD: 14 % — SIGNIFICANT CHANGE UP (ref 10.3–14.5)
RBC # FLD: 14.1 % — SIGNIFICANT CHANGE UP (ref 10.3–14.5)
RBC # FLD: 14.3 % — SIGNIFICANT CHANGE UP (ref 10.3–14.5)
RBC # FLD: 14.4 % — SIGNIFICANT CHANGE UP (ref 10.3–14.5)
RBC # FLD: 14.5 % — SIGNIFICANT CHANGE UP (ref 10.3–14.5)
RBC # FLD: 14.6 % — HIGH (ref 10.3–14.5)
RBC # FLD: 15 % — HIGH (ref 10.3–14.5)
RBC # FLD: 15.1 % — HIGH (ref 10.3–14.5)
RBC # FLD: 15.2 % — HIGH (ref 10.3–14.5)
RBC # FLD: 15.4 % — HIGH (ref 10.3–14.5)
SALICYLATES SERPL-MCNC: <1.7 MG/DL — LOW (ref 2.8–20)
SAO2 % BLDA: 96 % — SIGNIFICANT CHANGE UP (ref 92–96)
SAO2 % BLDA: 96 % — SIGNIFICANT CHANGE UP (ref 92–96)
SAO2 % BLDA: 98 % — HIGH (ref 92–96)
SAO2 % BLDA: 98 % — HIGH (ref 92–96)
SAO2 % BLDA: 99 % — HIGH (ref 92–96)
SAO2 % BLDV: 74 % — SIGNIFICANT CHANGE UP (ref 67–88)
SODIUM SERPL-SCNC: 140 MMOL/L — SIGNIFICANT CHANGE UP (ref 135–145)
SODIUM SERPL-SCNC: 141 MMOL/L — SIGNIFICANT CHANGE UP (ref 135–145)
SODIUM SERPL-SCNC: 141 MMOL/L — SIGNIFICANT CHANGE UP (ref 135–145)
SODIUM SERPL-SCNC: 143 MMOL/L — SIGNIFICANT CHANGE UP (ref 135–145)
SODIUM SERPL-SCNC: 143 MMOL/L — SIGNIFICANT CHANGE UP (ref 135–145)
SODIUM SERPL-SCNC: 144 MMOL/L — SIGNIFICANT CHANGE UP (ref 135–145)
SODIUM SERPL-SCNC: 144 MMOL/L — SIGNIFICANT CHANGE UP (ref 135–145)
SODIUM SERPL-SCNC: 145 MMOL/L — SIGNIFICANT CHANGE UP (ref 135–145)
SODIUM SERPL-SCNC: 146 MMOL/L — HIGH (ref 135–145)
SODIUM SERPL-SCNC: 147 MMOL/L — HIGH (ref 135–145)
SODIUM SERPL-SCNC: 148 MMOL/L — HIGH (ref 135–145)
SODIUM SERPL-SCNC: 149 MMOL/L — HIGH (ref 135–145)
SODIUM SERPL-SCNC: 149 MMOL/L — HIGH (ref 135–145)
SODIUM SERPL-SCNC: 150 MMOL/L — HIGH (ref 135–145)
SODIUM SERPL-SCNC: 150 MMOL/L — HIGH (ref 135–145)
SODIUM SERPL-SCNC: 151 MMOL/L — HIGH (ref 135–145)
SODIUM SERPL-SCNC: 152 MMOL/L — HIGH (ref 135–145)
SODIUM SERPL-SCNC: 152 MMOL/L — HIGH (ref 135–145)
SODIUM SERPL-SCNC: 153 MMOL/L — HIGH (ref 135–145)
SODIUM SERPL-SCNC: 154 MMOL/L — HIGH (ref 135–145)
SODIUM SERPL-SCNC: 156 MMOL/L — HIGH (ref 135–145)
SODIUM SERPL-SCNC: 156 MMOL/L — HIGH (ref 135–145)
SODIUM SERPL-SCNC: SIGNIFICANT CHANGE UP MMOL/L (ref 135–145)
SODIUM UR-SCNC: 31 MMOL/L — LOW (ref 40–220)
SODIUM UR-SCNC: 38 MMOL/L — LOW (ref 40–220)
SODIUM UR-SCNC: 50 MMOL/L — SIGNIFICANT CHANGE UP (ref 40–220)
SODIUM UR-SCNC: 50 MMOL/L — SIGNIFICANT CHANGE UP (ref 40–220)
SODIUM UR-SCNC: <5 MMOL/L — LOW (ref 40–220)
SP GR SPEC: 1.01 — SIGNIFICANT CHANGE UP (ref 1.01–1.02)
SP GR SPEC: 1.02 — SIGNIFICANT CHANGE UP (ref 1.01–1.02)
SPECIMEN SOURCE: SIGNIFICANT CHANGE UP
TOTAL CHOLESTEROL/HDL RATIO MEASUREMENT: 5.5 RATIO — SIGNIFICANT CHANGE UP (ref 3.4–9.6)
TOTAL CHOLESTEROL/HDL RATIO MEASUREMENT: 6.3 RATIO — SIGNIFICANT CHANGE UP (ref 3.4–9.6)
TRIGL SERPL-MCNC: 252 MG/DL — HIGH (ref 10–149)
TRIGL SERPL-MCNC: 374 MG/DL — HIGH (ref 10–149)
TROPONIN I SERPL-MCNC: 0.91 NG/ML — HIGH (ref 0–0.04)
TROPONIN I SERPL-MCNC: 0.95 NG/ML — HIGH (ref 0–0.04)
TROPONIN I SERPL-MCNC: 1.37 NG/ML — HIGH (ref 0–0.04)
UROBILINOGEN FLD QL: NEGATIVE — SIGNIFICANT CHANGE UP
UROBILINOGEN FLD QL: NEGATIVE — SIGNIFICANT CHANGE UP
WBC # BLD: 10 K/UL — SIGNIFICANT CHANGE UP (ref 3.8–10.5)
WBC # BLD: 10.6 K/UL — HIGH (ref 3.8–10.5)
WBC # BLD: 10.7 K/UL — HIGH (ref 3.8–10.5)
WBC # BLD: 10.7 K/UL — HIGH (ref 3.8–10.5)
WBC # BLD: 10.8 K/UL — HIGH (ref 3.8–10.5)
WBC # BLD: 10.8 K/UL — HIGH (ref 3.8–10.5)
WBC # BLD: 11.6 K/UL — HIGH (ref 3.8–10.5)
WBC # BLD: 12.6 K/UL — HIGH (ref 3.8–10.5)
WBC # BLD: 12.9 K/UL — HIGH (ref 3.8–10.5)
WBC # BLD: 14.8 K/UL — HIGH (ref 3.8–10.5)
WBC # BLD: 16.2 K/UL — HIGH (ref 3.8–10.5)
WBC # BLD: 18.3 K/UL — HIGH (ref 3.8–10.5)
WBC # BLD: 21.1 K/UL — HIGH (ref 3.8–10.5)
WBC # BLD: 21.1 K/UL — HIGH (ref 3.8–10.5)
WBC # BLD: 21.4 K/UL — HIGH (ref 3.8–10.5)
WBC # BLD: 21.9 K/UL — HIGH (ref 3.8–10.5)
WBC # BLD: 22.7 K/UL — HIGH (ref 3.8–10.5)
WBC # BLD: 4 K/UL — SIGNIFICANT CHANGE UP (ref 3.8–10.5)
WBC # BLD: 4.6 K/UL — SIGNIFICANT CHANGE UP (ref 3.8–10.5)
WBC # BLD: 4.6 K/UL — SIGNIFICANT CHANGE UP (ref 3.8–10.5)
WBC # BLD: 4.8 K/UL — SIGNIFICANT CHANGE UP (ref 3.8–10.5)
WBC # BLD: 5.6 K/UL — SIGNIFICANT CHANGE UP (ref 3.8–10.5)
WBC # BLD: 5.7 K/UL — SIGNIFICANT CHANGE UP (ref 3.8–10.5)
WBC # BLD: 5.7 K/UL — SIGNIFICANT CHANGE UP (ref 3.8–10.5)
WBC # BLD: 5.9 K/UL — SIGNIFICANT CHANGE UP (ref 3.8–10.5)
WBC # BLD: 6 K/UL — SIGNIFICANT CHANGE UP (ref 3.8–10.5)
WBC # BLD: 6 K/UL — SIGNIFICANT CHANGE UP (ref 3.8–10.5)
WBC # BLD: 6.2 K/UL — SIGNIFICANT CHANGE UP (ref 3.8–10.5)
WBC # BLD: 7.2 K/UL — SIGNIFICANT CHANGE UP (ref 3.8–10.5)
WBC # BLD: 7.5 K/UL — SIGNIFICANT CHANGE UP (ref 3.8–10.5)
WBC # BLD: 8.2 K/UL — SIGNIFICANT CHANGE UP (ref 3.8–10.5)
WBC # BLD: 8.2 K/UL — SIGNIFICANT CHANGE UP (ref 3.8–10.5)
WBC # BLD: 8.6 K/UL — SIGNIFICANT CHANGE UP (ref 3.8–10.5)
WBC # BLD: 8.7 K/UL — SIGNIFICANT CHANGE UP (ref 3.8–10.5)
WBC # BLD: 8.7 K/UL — SIGNIFICANT CHANGE UP (ref 3.8–10.5)
WBC # BLD: 9.4 K/UL — SIGNIFICANT CHANGE UP (ref 3.8–10.5)
WBC # FLD AUTO: 10 K/UL — SIGNIFICANT CHANGE UP (ref 3.8–10.5)
WBC # FLD AUTO: 10.6 K/UL — HIGH (ref 3.8–10.5)
WBC # FLD AUTO: 10.7 K/UL — HIGH (ref 3.8–10.5)
WBC # FLD AUTO: 10.7 K/UL — HIGH (ref 3.8–10.5)
WBC # FLD AUTO: 10.8 K/UL — HIGH (ref 3.8–10.5)
WBC # FLD AUTO: 10.8 K/UL — HIGH (ref 3.8–10.5)
WBC # FLD AUTO: 11.6 K/UL — HIGH (ref 3.8–10.5)
WBC # FLD AUTO: 12.6 K/UL — HIGH (ref 3.8–10.5)
WBC # FLD AUTO: 12.9 K/UL — HIGH (ref 3.8–10.5)
WBC # FLD AUTO: 14.8 K/UL — HIGH (ref 3.8–10.5)
WBC # FLD AUTO: 16.2 K/UL — HIGH (ref 3.8–10.5)
WBC # FLD AUTO: 18.3 K/UL — HIGH (ref 3.8–10.5)
WBC # FLD AUTO: 21.1 K/UL — HIGH (ref 3.8–10.5)
WBC # FLD AUTO: 21.1 K/UL — HIGH (ref 3.8–10.5)
WBC # FLD AUTO: 21.4 K/UL — HIGH (ref 3.8–10.5)
WBC # FLD AUTO: 21.9 K/UL — HIGH (ref 3.8–10.5)
WBC # FLD AUTO: 22.7 K/UL — HIGH (ref 3.8–10.5)
WBC # FLD AUTO: 4 K/UL — SIGNIFICANT CHANGE UP (ref 3.8–10.5)
WBC # FLD AUTO: 4.6 K/UL — SIGNIFICANT CHANGE UP (ref 3.8–10.5)
WBC # FLD AUTO: 4.6 K/UL — SIGNIFICANT CHANGE UP (ref 3.8–10.5)
WBC # FLD AUTO: 4.8 K/UL — SIGNIFICANT CHANGE UP (ref 3.8–10.5)
WBC # FLD AUTO: 5.6 K/UL — SIGNIFICANT CHANGE UP (ref 3.8–10.5)
WBC # FLD AUTO: 5.7 K/UL — SIGNIFICANT CHANGE UP (ref 3.8–10.5)
WBC # FLD AUTO: 5.7 K/UL — SIGNIFICANT CHANGE UP (ref 3.8–10.5)
WBC # FLD AUTO: 5.9 K/UL — SIGNIFICANT CHANGE UP (ref 3.8–10.5)
WBC # FLD AUTO: 6 K/UL — SIGNIFICANT CHANGE UP (ref 3.8–10.5)
WBC # FLD AUTO: 6 K/UL — SIGNIFICANT CHANGE UP (ref 3.8–10.5)
WBC # FLD AUTO: 6.2 K/UL — SIGNIFICANT CHANGE UP (ref 3.8–10.5)
WBC # FLD AUTO: 7.2 K/UL — SIGNIFICANT CHANGE UP (ref 3.8–10.5)
WBC # FLD AUTO: 7.5 K/UL — SIGNIFICANT CHANGE UP (ref 3.8–10.5)
WBC # FLD AUTO: 8.2 K/UL — SIGNIFICANT CHANGE UP (ref 3.8–10.5)
WBC # FLD AUTO: 8.2 K/UL — SIGNIFICANT CHANGE UP (ref 3.8–10.5)
WBC # FLD AUTO: 8.6 K/UL — SIGNIFICANT CHANGE UP (ref 3.8–10.5)
WBC # FLD AUTO: 8.7 K/UL — SIGNIFICANT CHANGE UP (ref 3.8–10.5)
WBC # FLD AUTO: 8.7 K/UL — SIGNIFICANT CHANGE UP (ref 3.8–10.5)
WBC # FLD AUTO: 9.4 K/UL — SIGNIFICANT CHANGE UP (ref 3.8–10.5)

## 2018-01-01 PROCEDURE — 70450 CT HEAD/BRAIN W/O DYE: CPT | Mod: 26

## 2018-01-01 PROCEDURE — 71045 X-RAY EXAM CHEST 1 VIEW: CPT | Mod: 26

## 2018-01-01 PROCEDURE — 82550 ASSAY OF CK (CPK): CPT

## 2018-01-01 PROCEDURE — 96374 THER/PROPH/DIAG INJ IV PUSH: CPT

## 2018-01-01 PROCEDURE — 99232 SBSQ HOSP IP/OBS MODERATE 35: CPT | Mod: GC

## 2018-01-01 PROCEDURE — 82803 BLOOD GASES ANY COMBINATION: CPT

## 2018-01-01 PROCEDURE — 84146 ASSAY OF PROLACTIN: CPT

## 2018-01-01 PROCEDURE — 82436 ASSAY OF URINE CHLORIDE: CPT

## 2018-01-01 PROCEDURE — 82553 CREATINE MB FRACTION: CPT

## 2018-01-01 PROCEDURE — 87070 CULTURE OTHR SPECIMN AEROBIC: CPT

## 2018-01-01 PROCEDURE — 84133 ASSAY OF URINE POTASSIUM: CPT

## 2018-01-01 PROCEDURE — 88312 SPECIAL STAINS GROUP 1: CPT | Mod: 26

## 2018-01-01 PROCEDURE — 36556 INSERT NON-TUNNEL CV CATH: CPT | Mod: GC

## 2018-01-01 PROCEDURE — 99233 SBSQ HOSP IP/OBS HIGH 50: CPT | Mod: GC

## 2018-01-01 PROCEDURE — 76700 US EXAM ABDOM COMPLETE: CPT | Mod: 26

## 2018-01-01 PROCEDURE — 99233 SBSQ HOSP IP/OBS HIGH 50: CPT

## 2018-01-01 PROCEDURE — 83605 ASSAY OF LACTIC ACID: CPT

## 2018-01-01 PROCEDURE — 82962 GLUCOSE BLOOD TEST: CPT

## 2018-01-01 PROCEDURE — 80307 DRUG TEST PRSMV CHEM ANLYZR: CPT

## 2018-01-01 PROCEDURE — 99232 SBSQ HOSP IP/OBS MODERATE 35: CPT

## 2018-01-01 PROCEDURE — 84300 ASSAY OF URINE SODIUM: CPT

## 2018-01-01 PROCEDURE — 80061 LIPID PANEL: CPT

## 2018-01-01 PROCEDURE — 71045 X-RAY EXAM CHEST 1 VIEW: CPT

## 2018-01-01 PROCEDURE — 94002 VENT MGMT INPAT INIT DAY: CPT

## 2018-01-01 PROCEDURE — 84156 ASSAY OF PROTEIN URINE: CPT

## 2018-01-01 PROCEDURE — 80053 COMPREHEN METABOLIC PANEL: CPT

## 2018-01-01 PROCEDURE — 88305 TISSUE EXAM BY PATHOLOGIST: CPT | Mod: 26

## 2018-01-01 PROCEDURE — 82272 OCCULT BLD FECES 1-3 TESTS: CPT

## 2018-01-01 PROCEDURE — 83935 ASSAY OF URINE OSMOLALITY: CPT

## 2018-01-01 PROCEDURE — 74174 CTA ABD&PLVS W/CONTRAST: CPT

## 2018-01-01 PROCEDURE — 80074 ACUTE HEPATITIS PANEL: CPT

## 2018-01-01 PROCEDURE — 80076 HEPATIC FUNCTION PANEL: CPT

## 2018-01-01 PROCEDURE — 87086 URINE CULTURE/COLONY COUNT: CPT

## 2018-01-01 PROCEDURE — 99291 CRITICAL CARE FIRST HOUR: CPT | Mod: 25

## 2018-01-01 PROCEDURE — 92610 EVALUATE SWALLOWING FUNCTION: CPT

## 2018-01-01 PROCEDURE — 84145 PROCALCITONIN (PCT): CPT

## 2018-01-01 PROCEDURE — 85610 PROTHROMBIN TIME: CPT

## 2018-01-01 PROCEDURE — 99223 1ST HOSP IP/OBS HIGH 75: CPT

## 2018-01-01 PROCEDURE — 97110 THERAPEUTIC EXERCISES: CPT

## 2018-01-01 PROCEDURE — 81001 URINALYSIS AUTO W/SCOPE: CPT

## 2018-01-01 PROCEDURE — 99291 CRITICAL CARE FIRST HOUR: CPT

## 2018-01-01 PROCEDURE — 88312 SPECIAL STAINS GROUP 1: CPT

## 2018-01-01 PROCEDURE — 90792 PSYCH DIAG EVAL W/MED SRVCS: CPT

## 2018-01-01 PROCEDURE — P9047: CPT

## 2018-01-01 PROCEDURE — 97112 NEUROMUSCULAR REEDUCATION: CPT

## 2018-01-01 PROCEDURE — 94640 AIRWAY INHALATION TREATMENT: CPT

## 2018-01-01 PROCEDURE — 76700 US EXAM ABDOM COMPLETE: CPT

## 2018-01-01 PROCEDURE — 87633 RESP VIRUS 12-25 TARGETS: CPT

## 2018-01-01 PROCEDURE — 87040 BLOOD CULTURE FOR BACTERIA: CPT

## 2018-01-01 PROCEDURE — 97530 THERAPEUTIC ACTIVITIES: CPT

## 2018-01-01 PROCEDURE — 93306 TTE W/DOPPLER COMPLETE: CPT

## 2018-01-01 PROCEDURE — 87581 M.PNEUMON DNA AMP PROBE: CPT

## 2018-01-01 PROCEDURE — 83036 HEMOGLOBIN GLYCOSYLATED A1C: CPT

## 2018-01-01 PROCEDURE — 74174 CTA ABD&PLVS W/CONTRAST: CPT | Mod: 26

## 2018-01-01 PROCEDURE — 82570 ASSAY OF URINE CREATININE: CPT

## 2018-01-01 PROCEDURE — 80048 BASIC METABOLIC PNL TOTAL CA: CPT

## 2018-01-01 PROCEDURE — 71045 X-RAY EXAM CHEST 1 VIEW: CPT | Mod: 26,77

## 2018-01-01 PROCEDURE — 85730 THROMBOPLASTIN TIME PARTIAL: CPT

## 2018-01-01 PROCEDURE — 93005 ELECTROCARDIOGRAM TRACING: CPT

## 2018-01-01 PROCEDURE — 87486 CHLMYD PNEUM DNA AMP PROBE: CPT

## 2018-01-01 PROCEDURE — 70450 CT HEAD/BRAIN W/O DYE: CPT

## 2018-01-01 PROCEDURE — 83735 ASSAY OF MAGNESIUM: CPT

## 2018-01-01 PROCEDURE — 31720 CLEARANCE OF AIRWAYS: CPT

## 2018-01-01 PROCEDURE — 88305 TISSUE EXAM BY PATHOLOGIST: CPT

## 2018-01-01 PROCEDURE — 83930 ASSAY OF BLOOD OSMOLALITY: CPT

## 2018-01-01 PROCEDURE — 84100 ASSAY OF PHOSPHORUS: CPT

## 2018-01-01 PROCEDURE — 87798 DETECT AGENT NOS DNA AMP: CPT

## 2018-01-01 PROCEDURE — 84484 ASSAY OF TROPONIN QUANT: CPT

## 2018-01-01 PROCEDURE — 85027 COMPLETE CBC AUTOMATED: CPT

## 2018-01-01 PROCEDURE — 82140 ASSAY OF AMMONIA: CPT

## 2018-01-01 PROCEDURE — 94003 VENT MGMT INPAT SUBQ DAY: CPT

## 2018-01-01 RX ORDER — PROPOFOL 10 MG/ML
5 INJECTION, EMULSION INTRAVENOUS
Qty: 1000 | Refills: 0 | Status: DISCONTINUED | OUTPATIENT
Start: 2018-01-01 | End: 2018-01-01

## 2018-01-01 RX ORDER — SODIUM CHLORIDE 9 MG/ML
1000 INJECTION, SOLUTION INTRAVENOUS
Qty: 0 | Refills: 0 | Status: DISCONTINUED | OUTPATIENT
Start: 2018-01-01 | End: 2018-01-01

## 2018-01-01 RX ORDER — FUROSEMIDE 40 MG
20 TABLET ORAL ONCE
Qty: 0 | Refills: 0 | Status: COMPLETED | OUTPATIENT
Start: 2018-01-01 | End: 2018-01-01

## 2018-01-01 RX ORDER — PANTOPRAZOLE SODIUM 20 MG/1
40 TABLET, DELAYED RELEASE ORAL
Qty: 0 | Refills: 0 | Status: DISCONTINUED | OUTPATIENT
Start: 2018-01-01 | End: 2018-01-01

## 2018-01-01 RX ORDER — SEVELAMER CARBONATE 2400 MG/1
1600 POWDER, FOR SUSPENSION ORAL
Qty: 0 | Refills: 0 | Status: DISCONTINUED | OUTPATIENT
Start: 2018-01-01 | End: 2018-01-01

## 2018-01-01 RX ORDER — IPRATROPIUM/ALBUTEROL SULFATE 18-103MCG
3 AEROSOL WITH ADAPTER (GRAM) INHALATION ONCE
Qty: 0 | Refills: 0 | Status: COMPLETED | OUTPATIENT
Start: 2018-01-01 | End: 2018-01-01

## 2018-01-01 RX ORDER — POTASSIUM PHOSPHATE, MONOBASIC POTASSIUM PHOSPHATE, DIBASIC 236; 224 MG/ML; MG/ML
15 INJECTION, SOLUTION INTRAVENOUS ONCE
Qty: 0 | Refills: 0 | Status: COMPLETED | OUTPATIENT
Start: 2018-01-01 | End: 2018-01-01

## 2018-01-01 RX ORDER — SODIUM CHLORIDE 9 MG/ML
3 INJECTION INTRAMUSCULAR; INTRAVENOUS; SUBCUTANEOUS ONCE
Qty: 0 | Refills: 0 | Status: COMPLETED | OUTPATIENT
Start: 2018-01-01 | End: 2018-01-01

## 2018-01-01 RX ORDER — OCTREOTIDE ACETATE 200 UG/ML
100 INJECTION, SOLUTION INTRAVENOUS; SUBCUTANEOUS THREE TIMES A DAY
Qty: 0 | Refills: 0 | Status: DISCONTINUED | OUTPATIENT
Start: 2018-01-01 | End: 2018-01-01

## 2018-01-01 RX ORDER — HEPARIN SODIUM 5000 [USP'U]/ML
5000 INJECTION INTRAVENOUS; SUBCUTANEOUS EVERY 8 HOURS
Qty: 0 | Refills: 0 | Status: DISCONTINUED | OUTPATIENT
Start: 2018-01-01 | End: 2018-01-01

## 2018-01-01 RX ORDER — INSULIN GLARGINE 100 [IU]/ML
6 INJECTION, SOLUTION SUBCUTANEOUS AT BEDTIME
Qty: 0 | Refills: 0 | Status: DISCONTINUED | OUTPATIENT
Start: 2018-01-01 | End: 2018-01-01

## 2018-01-01 RX ORDER — HYDRALAZINE HCL 50 MG
10 TABLET ORAL ONCE
Qty: 0 | Refills: 0 | Status: COMPLETED | OUTPATIENT
Start: 2018-01-01 | End: 2018-01-01

## 2018-01-01 RX ORDER — ACETAMINOPHEN 500 MG
650 TABLET ORAL ONCE
Qty: 0 | Refills: 0 | Status: COMPLETED | OUTPATIENT
Start: 2018-01-01 | End: 2018-01-01

## 2018-01-01 RX ORDER — IPRATROPIUM BROMIDE 0.2 MG/ML
1 SOLUTION, NON-ORAL INHALATION EVERY 6 HOURS
Qty: 0 | Refills: 0 | Status: DISCONTINUED | OUTPATIENT
Start: 2018-01-01 | End: 2018-01-01

## 2018-01-01 RX ORDER — POTASSIUM CHLORIDE 20 MEQ
10 PACKET (EA) ORAL
Qty: 0 | Refills: 0 | Status: COMPLETED | OUTPATIENT
Start: 2018-01-01 | End: 2018-01-01

## 2018-01-01 RX ORDER — LISINOPRIL 2.5 MG/1
5 TABLET ORAL DAILY
Qty: 0 | Refills: 0 | Status: DISCONTINUED | OUTPATIENT
Start: 2018-01-01 | End: 2018-01-01

## 2018-01-01 RX ORDER — CEFTRIAXONE 500 MG/1
1 INJECTION, POWDER, FOR SOLUTION INTRAMUSCULAR; INTRAVENOUS EVERY 24 HOURS
Qty: 0 | Refills: 0 | Status: DISCONTINUED | OUTPATIENT
Start: 2018-01-01 | End: 2018-01-01

## 2018-01-01 RX ORDER — METOPROLOL TARTRATE 50 MG
25 TABLET ORAL DAILY
Qty: 0 | Refills: 0 | Status: DISCONTINUED | OUTPATIENT
Start: 2018-01-01 | End: 2018-01-01

## 2018-01-01 RX ORDER — MAGNESIUM SULFATE 500 MG/ML
1 VIAL (ML) INJECTION ONCE
Qty: 0 | Refills: 0 | Status: COMPLETED | OUTPATIENT
Start: 2018-01-01 | End: 2018-01-01

## 2018-01-01 RX ORDER — HALOPERIDOL DECANOATE 100 MG/ML
0.5 INJECTION INTRAMUSCULAR ONCE
Qty: 0 | Refills: 0 | Status: COMPLETED | OUTPATIENT
Start: 2018-01-01 | End: 2018-01-01

## 2018-01-01 RX ORDER — SODIUM BICARBONATE 1 MEQ/ML
50 SYRINGE (ML) INTRAVENOUS ONCE
Qty: 0 | Refills: 0 | Status: COMPLETED | OUTPATIENT
Start: 2018-01-01 | End: 2018-01-01

## 2018-01-01 RX ORDER — PHENYLEPHRINE HYDROCHLORIDE 10 MG/ML
0.5 INJECTION INTRAVENOUS
Qty: 160 | Refills: 0 | Status: DISCONTINUED | OUTPATIENT
Start: 2018-01-01 | End: 2018-01-01

## 2018-01-01 RX ORDER — AMIODARONE HYDROCHLORIDE 400 MG/1
150 TABLET ORAL ONCE
Qty: 0 | Refills: 0 | Status: COMPLETED | OUTPATIENT
Start: 2018-01-01 | End: 2018-01-01

## 2018-01-01 RX ORDER — SEVELAMER CARBONATE 2400 MG/1
800 POWDER, FOR SUSPENSION ORAL
Qty: 0 | Refills: 0 | Status: DISCONTINUED | OUTPATIENT
Start: 2018-01-01 | End: 2018-01-01

## 2018-01-01 RX ORDER — NYSTATIN CREAM 100000 [USP'U]/G
1 CREAM TOPICAL
Qty: 0 | Refills: 0 | Status: DISCONTINUED | OUTPATIENT
Start: 2018-01-01 | End: 2018-01-01

## 2018-01-01 RX ORDER — DIGOXIN 250 MCG
0.25 TABLET ORAL ONCE
Qty: 0 | Refills: 0 | Status: COMPLETED | OUTPATIENT
Start: 2018-01-01 | End: 2018-01-01

## 2018-01-01 RX ORDER — AMIODARONE HYDROCHLORIDE 400 MG/1
0.5 TABLET ORAL
Qty: 900 | Refills: 0 | Status: DISCONTINUED | OUTPATIENT
Start: 2018-01-01 | End: 2018-01-01

## 2018-01-01 RX ORDER — MORPHINE SULFATE 50 MG/1
1 CAPSULE, EXTENDED RELEASE ORAL ONCE
Qty: 0 | Refills: 0 | Status: DISCONTINUED | OUTPATIENT
Start: 2018-01-01 | End: 2018-01-01

## 2018-01-01 RX ORDER — AMIODARONE HYDROCHLORIDE 400 MG/1
200 TABLET ORAL DAILY
Qty: 0 | Refills: 0 | Status: DISCONTINUED | OUTPATIENT
Start: 2018-01-01 | End: 2018-01-01

## 2018-01-01 RX ORDER — HEPARIN SODIUM 5000 [USP'U]/ML
INJECTION INTRAVENOUS; SUBCUTANEOUS
Qty: 25000 | Refills: 0 | Status: DISCONTINUED | OUTPATIENT
Start: 2018-01-01 | End: 2018-01-01

## 2018-01-01 RX ORDER — LISINOPRIL 2.5 MG/1
2.5 TABLET ORAL DAILY
Qty: 0 | Refills: 0 | Status: DISCONTINUED | OUTPATIENT
Start: 2018-01-01 | End: 2018-01-01

## 2018-01-01 RX ORDER — METOPROLOL TARTRATE 50 MG
2.5 TABLET ORAL ONCE
Qty: 0 | Refills: 0 | Status: COMPLETED | OUTPATIENT
Start: 2018-01-01 | End: 2018-01-01

## 2018-01-01 RX ORDER — HALOPERIDOL DECANOATE 100 MG/ML
2 INJECTION INTRAMUSCULAR EVERY 4 HOURS
Qty: 0 | Refills: 0 | Status: DISCONTINUED | OUTPATIENT
Start: 2018-01-01 | End: 2018-01-01

## 2018-01-01 RX ORDER — CEFEPIME 1 G/1
1000 INJECTION, POWDER, FOR SOLUTION INTRAMUSCULAR; INTRAVENOUS ONCE
Qty: 0 | Refills: 0 | Status: COMPLETED | OUTPATIENT
Start: 2018-01-01 | End: 2018-01-01

## 2018-01-01 RX ORDER — PHYTONADIONE (VIT K1) 5 MG
10 TABLET ORAL DAILY
Qty: 0 | Refills: 0 | Status: COMPLETED | OUTPATIENT
Start: 2018-01-01 | End: 2018-01-01

## 2018-01-01 RX ORDER — METOPROLOL TARTRATE 50 MG
25 TABLET ORAL
Qty: 0 | Refills: 0 | Status: DISCONTINUED | OUTPATIENT
Start: 2018-01-01 | End: 2018-01-01

## 2018-01-01 RX ORDER — POTASSIUM CHLORIDE 20 MEQ
40 PACKET (EA) ORAL EVERY 4 HOURS
Qty: 0 | Refills: 0 | Status: COMPLETED | OUTPATIENT
Start: 2018-01-01 | End: 2018-01-01

## 2018-01-01 RX ORDER — SODIUM CHLORIDE 9 MG/ML
500 INJECTION INTRAMUSCULAR; INTRAVENOUS; SUBCUTANEOUS
Qty: 0 | Refills: 0 | Status: COMPLETED | OUTPATIENT
Start: 2018-01-01 | End: 2018-01-01

## 2018-01-01 RX ORDER — AMIODARONE HYDROCHLORIDE 400 MG/1
400 TABLET ORAL
Qty: 0 | Refills: 0 | Status: DISCONTINUED | OUTPATIENT
Start: 2018-01-01 | End: 2018-01-01

## 2018-01-01 RX ORDER — HYDROMORPHONE HYDROCHLORIDE 2 MG/ML
0.5 INJECTION INTRAMUSCULAR; INTRAVENOUS; SUBCUTANEOUS EVERY 6 HOURS
Qty: 0 | Refills: 0 | Status: DISCONTINUED | OUTPATIENT
Start: 2018-01-01 | End: 2018-01-01

## 2018-01-01 RX ORDER — AZITHROMYCIN 500 MG/1
500 TABLET, FILM COATED ORAL EVERY 24 HOURS
Qty: 0 | Refills: 0 | Status: DISCONTINUED | OUTPATIENT
Start: 2018-01-01 | End: 2018-01-01

## 2018-01-01 RX ORDER — DIGOXIN 250 MCG
0.12 TABLET ORAL DAILY
Qty: 0 | Refills: 0 | Status: DISCONTINUED | OUTPATIENT
Start: 2018-01-01 | End: 2018-01-01

## 2018-01-01 RX ORDER — AMIODARONE HYDROCHLORIDE 400 MG/1
1 TABLET ORAL
Qty: 900 | Refills: 0 | Status: DISCONTINUED | OUTPATIENT
Start: 2018-01-01 | End: 2018-01-01

## 2018-01-01 RX ORDER — CEFEPIME 1 G/1
INJECTION, POWDER, FOR SOLUTION INTRAMUSCULAR; INTRAVENOUS
Qty: 0 | Refills: 0 | Status: DISCONTINUED | OUTPATIENT
Start: 2018-01-01 | End: 2018-01-01

## 2018-01-01 RX ORDER — AMPICILLIN SODIUM AND SULBACTAM SODIUM 250; 125 MG/ML; MG/ML
1.5 INJECTION, POWDER, FOR SUSPENSION INTRAMUSCULAR; INTRAVENOUS ONCE
Qty: 0 | Refills: 0 | Status: COMPLETED | OUTPATIENT
Start: 2018-01-01 | End: 2018-01-01

## 2018-01-01 RX ORDER — SOD SULF/SODIUM/NAHCO3/KCL/PEG
4000 SOLUTION, RECONSTITUTED, ORAL ORAL ONCE
Qty: 0 | Refills: 0 | Status: COMPLETED | OUTPATIENT
Start: 2018-01-01 | End: 2018-01-01

## 2018-01-01 RX ORDER — MAGNESIUM SULFATE 500 MG/ML
2 VIAL (ML) INJECTION ONCE
Qty: 0 | Refills: 0 | Status: COMPLETED | OUTPATIENT
Start: 2018-01-01 | End: 2018-01-01

## 2018-01-01 RX ORDER — CALCIUM GLUCONATE 100 MG/ML
2 VIAL (ML) INTRAVENOUS ONCE
Qty: 0 | Refills: 0 | Status: COMPLETED | OUTPATIENT
Start: 2018-01-01 | End: 2018-01-01

## 2018-01-01 RX ORDER — METOPROLOL TARTRATE 50 MG
50 TABLET ORAL ONCE
Qty: 0 | Refills: 0 | Status: DISCONTINUED | OUTPATIENT
Start: 2018-01-01 | End: 2018-01-01

## 2018-01-01 RX ORDER — DEXMEDETOMIDINE HYDROCHLORIDE IN 0.9% SODIUM CHLORIDE 4 UG/ML
0.5 INJECTION INTRAVENOUS
Qty: 400 | Refills: 0 | Status: DISCONTINUED | OUTPATIENT
Start: 2018-01-01 | End: 2018-01-01

## 2018-01-01 RX ORDER — INSULIN GLARGINE 100 [IU]/ML
8 INJECTION, SOLUTION SUBCUTANEOUS AT BEDTIME
Qty: 0 | Refills: 0 | Status: DISCONTINUED | OUTPATIENT
Start: 2018-01-01 | End: 2018-01-01

## 2018-01-01 RX ORDER — PHYTONADIONE (VIT K1) 5 MG
5 TABLET ORAL DAILY
Qty: 0 | Refills: 0 | Status: DISCONTINUED | OUTPATIENT
Start: 2018-01-01 | End: 2018-01-01

## 2018-01-01 RX ORDER — LACTULOSE 10 G/15ML
10 SOLUTION ORAL EVERY 8 HOURS
Qty: 0 | Refills: 0 | Status: DISCONTINUED | OUTPATIENT
Start: 2018-01-01 | End: 2018-01-01

## 2018-01-01 RX ORDER — METOPROLOL TARTRATE 50 MG
2.5 TABLET ORAL EVERY 4 HOURS
Qty: 0 | Refills: 0 | Status: DISCONTINUED | OUTPATIENT
Start: 2018-01-01 | End: 2018-01-01

## 2018-01-01 RX ORDER — DEXTROSE MONOHYDRATE, SODIUM CHLORIDE, AND POTASSIUM CHLORIDE 50; .745; 4.5 G/1000ML; G/1000ML; G/1000ML
1000 INJECTION, SOLUTION INTRAVENOUS
Qty: 0 | Refills: 0 | Status: DISCONTINUED | OUTPATIENT
Start: 2018-01-01 | End: 2018-01-01

## 2018-01-01 RX ORDER — IPRATROPIUM BROMIDE 0.2 MG/ML
1 SOLUTION, NON-ORAL INHALATION EVERY 4 HOURS
Qty: 0 | Refills: 0 | Status: DISCONTINUED | OUTPATIENT
Start: 2018-01-01 | End: 2018-01-01

## 2018-01-01 RX ORDER — SODIUM CHLORIDE 9 MG/ML
500 INJECTION INTRAMUSCULAR; INTRAVENOUS; SUBCUTANEOUS ONCE
Qty: 0 | Refills: 0 | Status: COMPLETED | OUTPATIENT
Start: 2018-01-01 | End: 2018-01-01

## 2018-01-01 RX ORDER — POTASSIUM CHLORIDE 20 MEQ
10 PACKET (EA) ORAL ONCE
Qty: 0 | Refills: 0 | Status: COMPLETED | OUTPATIENT
Start: 2018-01-01 | End: 2018-01-01

## 2018-01-01 RX ORDER — METOPROLOL TARTRATE 50 MG
12.5 TABLET ORAL
Qty: 0 | Refills: 0 | Status: DISCONTINUED | OUTPATIENT
Start: 2018-01-01 | End: 2018-01-01

## 2018-01-01 RX ORDER — DEXTROSE 50 % IN WATER 50 %
50 SYRINGE (ML) INTRAVENOUS ONCE
Qty: 0 | Refills: 0 | Status: COMPLETED | OUTPATIENT
Start: 2018-01-01 | End: 2018-01-01

## 2018-01-01 RX ORDER — FENTANYL CITRATE 50 UG/ML
50 INJECTION INTRAVENOUS ONCE
Qty: 0 | Refills: 0 | Status: DISCONTINUED | OUTPATIENT
Start: 2018-01-01 | End: 2018-01-01

## 2018-01-01 RX ORDER — INSULIN LISPRO 100/ML
VIAL (ML) SUBCUTANEOUS
Qty: 0 | Refills: 0 | Status: DISCONTINUED | OUTPATIENT
Start: 2018-01-01 | End: 2018-01-01

## 2018-01-01 RX ORDER — PANTOPRAZOLE SODIUM 20 MG/1
40 TABLET, DELAYED RELEASE ORAL DAILY
Qty: 0 | Refills: 0 | Status: DISCONTINUED | OUTPATIENT
Start: 2018-01-01 | End: 2018-01-01

## 2018-01-01 RX ORDER — POTASSIUM CHLORIDE 20 MEQ
40 PACKET (EA) ORAL ONCE
Qty: 0 | Refills: 0 | Status: COMPLETED | OUTPATIENT
Start: 2018-01-01 | End: 2018-01-01

## 2018-01-01 RX ORDER — POTASSIUM CHLORIDE 20 MEQ
40 PACKET (EA) ORAL ONCE
Qty: 0 | Refills: 0 | Status: DISCONTINUED | OUTPATIENT
Start: 2018-01-01 | End: 2018-01-01

## 2018-01-01 RX ORDER — SODIUM CHLORIDE 9 MG/ML
2000 INJECTION INTRAMUSCULAR; INTRAVENOUS; SUBCUTANEOUS ONCE
Qty: 0 | Refills: 0 | Status: COMPLETED | OUTPATIENT
Start: 2018-01-01 | End: 2018-01-01

## 2018-01-01 RX ORDER — SODIUM CHLORIDE 9 MG/ML
1000 INJECTION INTRAMUSCULAR; INTRAVENOUS; SUBCUTANEOUS
Qty: 0 | Refills: 0 | Status: DISCONTINUED | OUTPATIENT
Start: 2018-01-01 | End: 2018-01-01

## 2018-01-01 RX ORDER — ALBUMIN HUMAN 25 %
25 VIAL (ML) INTRAVENOUS EVERY 8 HOURS
Qty: 0 | Refills: 0 | Status: COMPLETED | OUTPATIENT
Start: 2018-01-01 | End: 2018-01-01

## 2018-01-01 RX ORDER — AMPICILLIN SODIUM AND SULBACTAM SODIUM 250; 125 MG/ML; MG/ML
1.5 INJECTION, POWDER, FOR SUSPENSION INTRAMUSCULAR; INTRAVENOUS EVERY 12 HOURS
Qty: 0 | Refills: 0 | Status: DISCONTINUED | OUTPATIENT
Start: 2018-01-01 | End: 2018-01-01

## 2018-01-01 RX ORDER — MULTIVIT WITH MIN/MFOLATE/K2 340-15/3 G
300 POWDER (GRAM) ORAL ONCE
Qty: 0 | Refills: 0 | Status: COMPLETED | OUTPATIENT
Start: 2018-01-01 | End: 2018-01-01

## 2018-01-01 RX ORDER — AMPICILLIN SODIUM AND SULBACTAM SODIUM 250; 125 MG/ML; MG/ML
INJECTION, POWDER, FOR SUSPENSION INTRAMUSCULAR; INTRAVENOUS
Qty: 0 | Refills: 0 | Status: DISCONTINUED | OUTPATIENT
Start: 2018-01-01 | End: 2018-01-01

## 2018-01-01 RX ORDER — ALBUTEROL 90 UG/1
2 AEROSOL, METERED ORAL EVERY 4 HOURS
Qty: 0 | Refills: 0 | Status: DISCONTINUED | OUTPATIENT
Start: 2018-01-01 | End: 2018-01-01

## 2018-01-01 RX ORDER — INSULIN LISPRO 100/ML
VIAL (ML) SUBCUTANEOUS EVERY 6 HOURS
Qty: 0 | Refills: 0 | Status: DISCONTINUED | OUTPATIENT
Start: 2018-01-01 | End: 2018-01-01

## 2018-01-01 RX ORDER — CEFEPIME 1 G/1
500 INJECTION, POWDER, FOR SOLUTION INTRAMUSCULAR; INTRAVENOUS EVERY 24 HOURS
Qty: 0 | Refills: 0 | Status: DISCONTINUED | OUTPATIENT
Start: 2018-01-01 | End: 2018-01-01

## 2018-01-01 RX ORDER — DIGOXIN 250 MCG
0.5 TABLET ORAL ONCE
Qty: 0 | Refills: 0 | Status: COMPLETED | OUTPATIENT
Start: 2018-01-01 | End: 2018-01-01

## 2018-01-01 RX ORDER — LACTOBACILLUS ACIDOPHILUS 100MM CELL
1 CAPSULE ORAL EVERY 8 HOURS
Qty: 0 | Refills: 0 | Status: DISCONTINUED | OUTPATIENT
Start: 2018-01-01 | End: 2018-01-01

## 2018-01-01 RX ORDER — INSULIN HUMAN 100 [IU]/ML
10 INJECTION, SOLUTION SUBCUTANEOUS ONCE
Qty: 0 | Refills: 0 | Status: COMPLETED | OUTPATIENT
Start: 2018-01-01 | End: 2018-01-01

## 2018-01-01 RX ORDER — POTASSIUM CHLORIDE 20 MEQ
10 PACKET (EA) ORAL
Qty: 0 | Refills: 0 | Status: DISCONTINUED | OUTPATIENT
Start: 2018-01-01 | End: 2018-01-01

## 2018-01-01 RX ADMIN — AMPICILLIN SODIUM AND SULBACTAM SODIUM 100 GRAM(S): 250; 125 INJECTION, POWDER, FOR SUSPENSION INTRAMUSCULAR; INTRAVENOUS at 05:32

## 2018-01-01 RX ADMIN — Medication 1 TABLET(S): at 13:33

## 2018-01-01 RX ADMIN — Medication 2.5 MILLIGRAM(S): at 17:40

## 2018-01-01 RX ADMIN — SODIUM CHLORIDE 75 MILLILITER(S): 9 INJECTION, SOLUTION INTRAVENOUS at 08:28

## 2018-01-01 RX ADMIN — Medication 1 PUFF(S): at 15:42

## 2018-01-01 RX ADMIN — SODIUM CHLORIDE 2000 MILLILITER(S): 9 INJECTION INTRAMUSCULAR; INTRAVENOUS; SUBCUTANEOUS at 14:16

## 2018-01-01 RX ADMIN — Medication 1 TABLET(S): at 13:12

## 2018-01-01 RX ADMIN — Medication 1 TABLET(S): at 05:34

## 2018-01-01 RX ADMIN — HEPARIN SODIUM 5000 UNIT(S): 5000 INJECTION INTRAVENOUS; SUBCUTANEOUS at 14:56

## 2018-01-01 RX ADMIN — Medication 2.5 MILLIGRAM(S): at 02:17

## 2018-01-01 RX ADMIN — Medication 1 TABLET(S): at 13:05

## 2018-01-01 RX ADMIN — PANTOPRAZOLE SODIUM 40 MILLIGRAM(S): 20 TABLET, DELAYED RELEASE ORAL at 17:36

## 2018-01-01 RX ADMIN — AMPICILLIN SODIUM AND SULBACTAM SODIUM 100 GRAM(S): 250; 125 INJECTION, POWDER, FOR SUSPENSION INTRAMUSCULAR; INTRAVENOUS at 18:10

## 2018-01-01 RX ADMIN — ALBUTEROL 2 PUFF(S): 90 AEROSOL, METERED ORAL at 08:28

## 2018-01-01 RX ADMIN — AMPICILLIN SODIUM AND SULBACTAM SODIUM 100 GRAM(S): 250; 125 INJECTION, POWDER, FOR SUSPENSION INTRAMUSCULAR; INTRAVENOUS at 17:17

## 2018-01-01 RX ADMIN — INSULIN GLARGINE 8 UNIT(S): 100 INJECTION, SOLUTION SUBCUTANEOUS at 21:50

## 2018-01-01 RX ADMIN — Medication 3 MILLILITER(S): at 23:30

## 2018-01-01 RX ADMIN — AMPICILLIN SODIUM AND SULBACTAM SODIUM 100 GRAM(S): 250; 125 INJECTION, POWDER, FOR SUSPENSION INTRAMUSCULAR; INTRAVENOUS at 06:04

## 2018-01-01 RX ADMIN — ALBUTEROL 2 PUFF(S): 90 AEROSOL, METERED ORAL at 02:21

## 2018-01-01 RX ADMIN — NYSTATIN CREAM 1 APPLICATION(S): 100000 CREAM TOPICAL at 17:32

## 2018-01-01 RX ADMIN — Medication 1: at 08:18

## 2018-01-01 RX ADMIN — AMPICILLIN SODIUM AND SULBACTAM SODIUM 100 GRAM(S): 250; 125 INJECTION, POWDER, FOR SUSPENSION INTRAMUSCULAR; INTRAVENOUS at 07:01

## 2018-01-01 RX ADMIN — Medication 1 PUFF(S): at 09:11

## 2018-01-01 RX ADMIN — Medication 2.5 MILLIGRAM(S): at 05:27

## 2018-01-01 RX ADMIN — OCTREOTIDE ACETATE 100 MICROGRAM(S): 200 INJECTION, SOLUTION INTRAVENOUS; SUBCUTANEOUS at 00:31

## 2018-01-01 RX ADMIN — Medication 1: at 08:27

## 2018-01-01 RX ADMIN — HEPARIN SODIUM 5000 UNIT(S): 5000 INJECTION INTRAVENOUS; SUBCUTANEOUS at 21:44

## 2018-01-01 RX ADMIN — AMIODARONE HYDROCHLORIDE 400 MILLIGRAM(S): 400 TABLET ORAL at 06:08

## 2018-01-01 RX ADMIN — Medication 100 MILLIEQUIVALENT(S): at 09:20

## 2018-01-01 RX ADMIN — Medication 100 MILLIEQUIVALENT(S): at 08:47

## 2018-01-01 RX ADMIN — SODIUM CHLORIDE 75 MILLILITER(S): 9 INJECTION, SOLUTION INTRAVENOUS at 12:29

## 2018-01-01 RX ADMIN — LISINOPRIL 2.5 MILLIGRAM(S): 2.5 TABLET ORAL at 05:27

## 2018-01-01 RX ADMIN — AMPICILLIN SODIUM AND SULBACTAM SODIUM 100 GRAM(S): 250; 125 INJECTION, POWDER, FOR SUSPENSION INTRAMUSCULAR; INTRAVENOUS at 06:31

## 2018-01-01 RX ADMIN — Medication 1 TABLET(S): at 13:53

## 2018-01-01 RX ADMIN — LISINOPRIL 2.5 MILLIGRAM(S): 2.5 TABLET ORAL at 05:34

## 2018-01-01 RX ADMIN — Medication 40 MILLIEQUIVALENT(S): at 17:25

## 2018-01-01 RX ADMIN — NYSTATIN CREAM 1 APPLICATION(S): 100000 CREAM TOPICAL at 05:28

## 2018-01-01 RX ADMIN — PROPOFOL 2.45 MICROGRAM(S)/KG/MIN: 10 INJECTION, EMULSION INTRAVENOUS at 05:07

## 2018-01-01 RX ADMIN — Medication 2.5 MILLIGRAM(S): at 22:02

## 2018-01-01 RX ADMIN — PROPOFOL 2.45 MICROGRAM(S)/KG/MIN: 10 INJECTION, EMULSION INTRAVENOUS at 20:12

## 2018-01-01 RX ADMIN — SODIUM CHLORIDE 500 MILLILITER(S): 9 INJECTION INTRAMUSCULAR; INTRAVENOUS; SUBCUTANEOUS at 10:53

## 2018-01-01 RX ADMIN — Medication 1 TABLET(S): at 13:07

## 2018-01-01 RX ADMIN — SODIUM CHLORIDE 70 MILLILITER(S): 9 INJECTION, SOLUTION INTRAVENOUS at 17:02

## 2018-01-01 RX ADMIN — PROPOFOL 2.45 MICROGRAM(S)/KG/MIN: 10 INJECTION, EMULSION INTRAVENOUS at 19:48

## 2018-01-01 RX ADMIN — Medication 1 TABLET(S): at 14:14

## 2018-01-01 RX ADMIN — Medication 1 TABLET(S): at 21:59

## 2018-01-01 RX ADMIN — NYSTATIN CREAM 1 APPLICATION(S): 100000 CREAM TOPICAL at 05:26

## 2018-01-01 RX ADMIN — LISINOPRIL 2.5 MILLIGRAM(S): 2.5 TABLET ORAL at 05:41

## 2018-01-01 RX ADMIN — Medication 1 PUFF(S): at 21:24

## 2018-01-01 RX ADMIN — INSULIN GLARGINE 8 UNIT(S): 100 INJECTION, SOLUTION SUBCUTANEOUS at 22:12

## 2018-01-01 RX ADMIN — DEXTROSE MONOHYDRATE, SODIUM CHLORIDE, AND POTASSIUM CHLORIDE 75 MILLILITER(S): 50; .745; 4.5 INJECTION, SOLUTION INTRAVENOUS at 15:19

## 2018-01-01 RX ADMIN — AMPICILLIN SODIUM AND SULBACTAM SODIUM 100 GRAM(S): 250; 125 INJECTION, POWDER, FOR SUSPENSION INTRAMUSCULAR; INTRAVENOUS at 17:58

## 2018-01-01 RX ADMIN — PANTOPRAZOLE SODIUM 40 MILLIGRAM(S): 20 TABLET, DELAYED RELEASE ORAL at 06:12

## 2018-01-01 RX ADMIN — INSULIN GLARGINE 8 UNIT(S): 100 INJECTION, SOLUTION SUBCUTANEOUS at 21:44

## 2018-01-01 RX ADMIN — HALOPERIDOL DECANOATE 2 MILLIGRAM(S): 100 INJECTION INTRAMUSCULAR at 13:24

## 2018-01-01 RX ADMIN — Medication 1 TABLET(S): at 21:21

## 2018-01-01 RX ADMIN — Medication 25 MILLIGRAM(S): at 06:38

## 2018-01-01 RX ADMIN — Medication 1: at 12:07

## 2018-01-01 RX ADMIN — SEVELAMER CARBONATE 1600 MILLIGRAM(S): 2400 POWDER, FOR SUSPENSION ORAL at 17:48

## 2018-01-01 RX ADMIN — Medication 100 MILLIGRAM(S): at 21:00

## 2018-01-01 RX ADMIN — Medication 650 MILLIGRAM(S): at 01:00

## 2018-01-01 RX ADMIN — SODIUM CHLORIDE 75 MILLILITER(S): 9 INJECTION, SOLUTION INTRAVENOUS at 18:17

## 2018-01-01 RX ADMIN — LISINOPRIL 2.5 MILLIGRAM(S): 2.5 TABLET ORAL at 06:11

## 2018-01-01 RX ADMIN — Medication 1 TABLET(S): at 05:29

## 2018-01-01 RX ADMIN — Medication 1 TABLET(S): at 05:47

## 2018-01-01 RX ADMIN — PANTOPRAZOLE SODIUM 40 MILLIGRAM(S): 20 TABLET, DELAYED RELEASE ORAL at 06:07

## 2018-01-01 RX ADMIN — Medication 100 MILLIGRAM(S): at 05:44

## 2018-01-01 RX ADMIN — ALBUTEROL 2 PUFF(S): 90 AEROSOL, METERED ORAL at 02:49

## 2018-01-01 RX ADMIN — Medication 0.12 MILLIGRAM(S): at 05:29

## 2018-01-01 RX ADMIN — Medication 1 TABLET(S): at 14:00

## 2018-01-01 RX ADMIN — ALBUTEROL 2 PUFF(S): 90 AEROSOL, METERED ORAL at 21:09

## 2018-01-01 RX ADMIN — PANTOPRAZOLE SODIUM 40 MILLIGRAM(S): 20 TABLET, DELAYED RELEASE ORAL at 05:38

## 2018-01-01 RX ADMIN — INSULIN GLARGINE 6 UNIT(S): 100 INJECTION, SOLUTION SUBCUTANEOUS at 21:54

## 2018-01-01 RX ADMIN — Medication 2.5 MILLIGRAM(S): at 13:25

## 2018-01-01 RX ADMIN — Medication 1 MILLIGRAM(S): at 14:51

## 2018-01-01 RX ADMIN — ALBUTEROL 2 PUFF(S): 90 AEROSOL, METERED ORAL at 10:16

## 2018-01-01 RX ADMIN — CEFEPIME 100 MILLIGRAM(S): 1 INJECTION, POWDER, FOR SOLUTION INTRAMUSCULAR; INTRAVENOUS at 00:55

## 2018-01-01 RX ADMIN — PANTOPRAZOLE SODIUM 40 MILLIGRAM(S): 20 TABLET, DELAYED RELEASE ORAL at 06:38

## 2018-01-01 RX ADMIN — Medication 25 MILLIGRAM(S): at 05:27

## 2018-01-01 RX ADMIN — SODIUM CHLORIDE 75 MILLILITER(S): 9 INJECTION, SOLUTION INTRAVENOUS at 12:36

## 2018-01-01 RX ADMIN — Medication 0.12 MILLIGRAM(S): at 05:27

## 2018-01-01 RX ADMIN — Medication 12.5 MILLIGRAM(S): at 17:56

## 2018-01-01 RX ADMIN — PANTOPRAZOLE SODIUM 40 MILLIGRAM(S): 20 TABLET, DELAYED RELEASE ORAL at 05:34

## 2018-01-01 RX ADMIN — LISINOPRIL 2.5 MILLIGRAM(S): 2.5 TABLET ORAL at 05:38

## 2018-01-01 RX ADMIN — ALBUTEROL 2 PUFF(S): 90 AEROSOL, METERED ORAL at 14:20

## 2018-01-01 RX ADMIN — PANTOPRAZOLE SODIUM 40 MILLIGRAM(S): 20 TABLET, DELAYED RELEASE ORAL at 17:59

## 2018-01-01 RX ADMIN — Medication 10 MILLIGRAM(S): at 02:46

## 2018-01-01 RX ADMIN — Medication 25 MILLIGRAM(S): at 05:28

## 2018-01-01 RX ADMIN — Medication 0.12 MILLIGRAM(S): at 05:46

## 2018-01-01 RX ADMIN — OCTREOTIDE ACETATE 100 MICROGRAM(S): 200 INJECTION, SOLUTION INTRAVENOUS; SUBCUTANEOUS at 06:16

## 2018-01-01 RX ADMIN — FENTANYL CITRATE 50 MICROGRAM(S): 50 INJECTION INTRAVENOUS at 14:14

## 2018-01-01 RX ADMIN — ALBUTEROL 2 PUFF(S): 90 AEROSOL, METERED ORAL at 21:58

## 2018-01-01 RX ADMIN — MORPHINE SULFATE 1 MILLIGRAM(S): 50 CAPSULE, EXTENDED RELEASE ORAL at 12:28

## 2018-01-01 RX ADMIN — Medication 2.5 MILLIGRAM(S): at 05:34

## 2018-01-01 RX ADMIN — Medication 1 TABLET(S): at 21:16

## 2018-01-01 RX ADMIN — LISINOPRIL 2.5 MILLIGRAM(S): 2.5 TABLET ORAL at 05:47

## 2018-01-01 RX ADMIN — SODIUM CHLORIDE 75 MILLILITER(S): 9 INJECTION, SOLUTION INTRAVENOUS at 12:15

## 2018-01-01 RX ADMIN — PANTOPRAZOLE SODIUM 40 MILLIGRAM(S): 20 TABLET, DELAYED RELEASE ORAL at 17:18

## 2018-01-01 RX ADMIN — Medication 1 PUFF(S): at 08:28

## 2018-01-01 RX ADMIN — Medication 0.12 MILLIGRAM(S): at 05:47

## 2018-01-01 RX ADMIN — NYSTATIN CREAM 1 APPLICATION(S): 100000 CREAM TOPICAL at 05:29

## 2018-01-01 RX ADMIN — PANTOPRAZOLE SODIUM 40 MILLIGRAM(S): 20 TABLET, DELAYED RELEASE ORAL at 18:10

## 2018-01-01 RX ADMIN — AMPICILLIN SODIUM AND SULBACTAM SODIUM 100 GRAM(S): 250; 125 INJECTION, POWDER, FOR SUSPENSION INTRAMUSCULAR; INTRAVENOUS at 17:47

## 2018-01-01 RX ADMIN — AMPICILLIN SODIUM AND SULBACTAM SODIUM 100 GRAM(S): 250; 125 INJECTION, POWDER, FOR SUSPENSION INTRAMUSCULAR; INTRAVENOUS at 17:18

## 2018-01-01 RX ADMIN — LISINOPRIL 2.5 MILLIGRAM(S): 2.5 TABLET ORAL at 05:52

## 2018-01-01 RX ADMIN — PANTOPRAZOLE SODIUM 40 MILLIGRAM(S): 20 TABLET, DELAYED RELEASE ORAL at 05:47

## 2018-01-01 RX ADMIN — Medication 1 TABLET(S): at 07:02

## 2018-01-01 RX ADMIN — Medication 1 PUFF(S): at 09:47

## 2018-01-01 RX ADMIN — Medication 25 MILLIGRAM(S): at 17:37

## 2018-01-01 RX ADMIN — SODIUM CHLORIDE 75 MILLILITER(S): 9 INJECTION, SOLUTION INTRAVENOUS at 08:48

## 2018-01-01 RX ADMIN — NYSTATIN CREAM 1 APPLICATION(S): 100000 CREAM TOPICAL at 17:28

## 2018-01-01 RX ADMIN — Medication 100 MILLIGRAM(S): at 15:10

## 2018-01-01 RX ADMIN — OCTREOTIDE ACETATE 100 MICROGRAM(S): 200 INJECTION, SOLUTION INTRAVENOUS; SUBCUTANEOUS at 06:14

## 2018-01-01 RX ADMIN — Medication 1 PUFF(S): at 14:01

## 2018-01-01 RX ADMIN — HYDROMORPHONE HYDROCHLORIDE 0.5 MILLIGRAM(S): 2 INJECTION INTRAMUSCULAR; INTRAVENOUS; SUBCUTANEOUS at 00:55

## 2018-01-01 RX ADMIN — PANTOPRAZOLE SODIUM 40 MILLIGRAM(S): 20 TABLET, DELAYED RELEASE ORAL at 15:14

## 2018-01-01 RX ADMIN — Medication 1 TABLET(S): at 13:31

## 2018-01-01 RX ADMIN — Medication 1 TABLET(S): at 05:52

## 2018-01-01 RX ADMIN — Medication 100 GRAM(S): at 06:10

## 2018-01-01 RX ADMIN — Medication 1 TABLET(S): at 15:23

## 2018-01-01 RX ADMIN — Medication 1 PUFF(S): at 21:09

## 2018-01-01 RX ADMIN — Medication 25 MILLIGRAM(S): at 06:13

## 2018-01-01 RX ADMIN — Medication 25 MILLIGRAM(S): at 05:47

## 2018-01-01 RX ADMIN — ALBUTEROL 2 PUFF(S): 90 AEROSOL, METERED ORAL at 18:19

## 2018-01-01 RX ADMIN — Medication 2.5 MILLIGRAM(S): at 22:05

## 2018-01-01 RX ADMIN — SODIUM CHLORIDE 50 MILLILITER(S): 9 INJECTION, SOLUTION INTRAVENOUS at 05:26

## 2018-01-01 RX ADMIN — Medication 650 MILLIGRAM(S): at 10:20

## 2018-01-01 RX ADMIN — Medication 1 TABLET(S): at 14:25

## 2018-01-01 RX ADMIN — Medication 1 TABLET(S): at 05:07

## 2018-01-01 RX ADMIN — PANTOPRAZOLE SODIUM 40 MILLIGRAM(S): 20 TABLET, DELAYED RELEASE ORAL at 05:41

## 2018-01-01 RX ADMIN — Medication 1 PUFF(S): at 05:38

## 2018-01-01 RX ADMIN — PANTOPRAZOLE SODIUM 40 MILLIGRAM(S): 20 TABLET, DELAYED RELEASE ORAL at 05:33

## 2018-01-01 RX ADMIN — ALBUTEROL 2 PUFF(S): 90 AEROSOL, METERED ORAL at 06:11

## 2018-01-01 RX ADMIN — Medication 100 MILLIEQUIVALENT(S): at 11:05

## 2018-01-01 RX ADMIN — NYSTATIN CREAM 1 APPLICATION(S): 100000 CREAM TOPICAL at 05:32

## 2018-01-01 RX ADMIN — Medication 10 MILLIGRAM(S): at 12:29

## 2018-01-01 RX ADMIN — SODIUM CHLORIDE 2000 MILLILITER(S): 9 INJECTION INTRAMUSCULAR; INTRAVENOUS; SUBCUTANEOUS at 12:12

## 2018-01-01 RX ADMIN — NYSTATIN CREAM 1 APPLICATION(S): 100000 CREAM TOPICAL at 17:12

## 2018-01-01 RX ADMIN — Medication 1: at 17:26

## 2018-01-01 RX ADMIN — ALBUTEROL 2 PUFF(S): 90 AEROSOL, METERED ORAL at 21:24

## 2018-01-01 RX ADMIN — Medication 0.12 MILLIGRAM(S): at 12:05

## 2018-01-01 RX ADMIN — HEPARIN SODIUM 5000 UNIT(S): 5000 INJECTION INTRAVENOUS; SUBCUTANEOUS at 05:46

## 2018-01-01 RX ADMIN — ALBUTEROL 2 PUFF(S): 90 AEROSOL, METERED ORAL at 17:16

## 2018-01-01 RX ADMIN — AMPICILLIN SODIUM AND SULBACTAM SODIUM 100 GRAM(S): 250; 125 INJECTION, POWDER, FOR SUSPENSION INTRAMUSCULAR; INTRAVENOUS at 06:11

## 2018-01-01 RX ADMIN — Medication 1 TABLET(S): at 21:11

## 2018-01-01 RX ADMIN — PANTOPRAZOLE SODIUM 40 MILLIGRAM(S): 20 TABLET, DELAYED RELEASE ORAL at 05:21

## 2018-01-01 RX ADMIN — Medication 2.5 MILLIGRAM(S): at 13:31

## 2018-01-01 RX ADMIN — Medication 25 MILLIGRAM(S): at 05:31

## 2018-01-01 RX ADMIN — PANTOPRAZOLE SODIUM 40 MILLIGRAM(S): 20 TABLET, DELAYED RELEASE ORAL at 05:58

## 2018-01-01 RX ADMIN — Medication 0.12 MILLIGRAM(S): at 05:34

## 2018-01-01 RX ADMIN — SEVELAMER CARBONATE 1600 MILLIGRAM(S): 2400 POWDER, FOR SUSPENSION ORAL at 13:54

## 2018-01-01 RX ADMIN — Medication 0.12 MILLIGRAM(S): at 05:28

## 2018-01-01 RX ADMIN — PROPOFOL 2.45 MICROGRAM(S)/KG/MIN: 10 INJECTION, EMULSION INTRAVENOUS at 00:27

## 2018-01-01 RX ADMIN — SEVELAMER CARBONATE 1600 MILLIGRAM(S): 2400 POWDER, FOR SUSPENSION ORAL at 10:13

## 2018-01-01 RX ADMIN — PROPOFOL 2.45 MICROGRAM(S)/KG/MIN: 10 INJECTION, EMULSION INTRAVENOUS at 15:24

## 2018-01-01 RX ADMIN — Medication 1 PUFF(S): at 02:21

## 2018-01-01 RX ADMIN — Medication 100 MILLIGRAM(S): at 05:07

## 2018-01-01 RX ADMIN — LISINOPRIL 2.5 MILLIGRAM(S): 2.5 TABLET ORAL at 05:31

## 2018-01-01 RX ADMIN — Medication 3 MILLILITER(S): at 15:51

## 2018-01-01 RX ADMIN — Medication 50 MILLILITER(S): at 16:05

## 2018-01-01 RX ADMIN — Medication 0.12 MILLIGRAM(S): at 05:55

## 2018-01-01 RX ADMIN — DEXMEDETOMIDINE HYDROCHLORIDE IN 0.9% SODIUM CHLORIDE 10.2 MICROGRAM(S)/KG/HR: 4 INJECTION INTRAVENOUS at 05:45

## 2018-01-01 RX ADMIN — Medication 2.5 MILLIGRAM(S): at 05:22

## 2018-01-01 RX ADMIN — HEPARIN SODIUM 5000 UNIT(S): 5000 INJECTION INTRAVENOUS; SUBCUTANEOUS at 05:58

## 2018-01-01 RX ADMIN — Medication 0.12 MILLIGRAM(S): at 05:22

## 2018-01-01 RX ADMIN — HEPARIN SODIUM 1500 UNIT(S)/HR: 5000 INJECTION INTRAVENOUS; SUBCUTANEOUS at 16:02

## 2018-01-01 RX ADMIN — Medication 0.12 MILLIGRAM(S): at 06:12

## 2018-01-01 RX ADMIN — HEPARIN SODIUM 5000 UNIT(S): 5000 INJECTION INTRAVENOUS; SUBCUTANEOUS at 14:02

## 2018-01-01 RX ADMIN — Medication 0.25 MILLIGRAM(S): at 19:38

## 2018-01-01 RX ADMIN — LISINOPRIL 2.5 MILLIGRAM(S): 2.5 TABLET ORAL at 05:21

## 2018-01-01 RX ADMIN — Medication 1 PUFF(S): at 02:41

## 2018-01-01 RX ADMIN — DEXMEDETOMIDINE HYDROCHLORIDE IN 0.9% SODIUM CHLORIDE 10.2 MICROGRAM(S)/KG/HR: 4 INJECTION INTRAVENOUS at 00:25

## 2018-01-01 RX ADMIN — Medication 1 TABLET(S): at 06:15

## 2018-01-01 RX ADMIN — Medication 1 TABLET(S): at 15:15

## 2018-01-01 RX ADMIN — Medication 30 MILLIGRAM(S): at 11:24

## 2018-01-01 RX ADMIN — HEPARIN SODIUM 5000 UNIT(S): 5000 INJECTION INTRAVENOUS; SUBCUTANEOUS at 05:53

## 2018-01-01 RX ADMIN — ALBUTEROL 2 PUFF(S): 90 AEROSOL, METERED ORAL at 05:13

## 2018-01-01 RX ADMIN — Medication 2: at 01:40

## 2018-01-01 RX ADMIN — Medication 100 MILLIEQUIVALENT(S): at 16:34

## 2018-01-01 RX ADMIN — Medication 2.5 MILLIGRAM(S): at 09:57

## 2018-01-01 RX ADMIN — Medication 1 TABLET(S): at 05:31

## 2018-01-01 RX ADMIN — Medication 1 PUFF(S): at 18:12

## 2018-01-01 RX ADMIN — Medication 100 GRAM(S): at 18:48

## 2018-01-01 RX ADMIN — Medication 0.12 MILLIGRAM(S): at 06:05

## 2018-01-01 RX ADMIN — ALBUTEROL 2 PUFF(S): 90 AEROSOL, METERED ORAL at 21:05

## 2018-01-01 RX ADMIN — AZITHROMYCIN 250 MILLIGRAM(S): 500 TABLET, FILM COATED ORAL at 15:14

## 2018-01-01 RX ADMIN — Medication 40 MILLIEQUIVALENT(S): at 02:59

## 2018-01-01 RX ADMIN — Medication 20 MILLIGRAM(S): at 17:32

## 2018-01-01 RX ADMIN — Medication 1 TABLET(S): at 21:06

## 2018-01-01 RX ADMIN — Medication 25 MILLIGRAM(S): at 17:17

## 2018-01-01 RX ADMIN — Medication 1 TABLET(S): at 22:21

## 2018-01-01 RX ADMIN — Medication 1: at 08:07

## 2018-01-01 RX ADMIN — Medication 1 TABLET(S): at 05:20

## 2018-01-01 RX ADMIN — Medication 1 TABLET(S): at 05:41

## 2018-01-01 RX ADMIN — PHENYLEPHRINE HYDROCHLORIDE 7.65 MICROGRAM(S)/KG/MIN: 10 INJECTION INTRAVENOUS at 19:02

## 2018-01-01 RX ADMIN — LISINOPRIL 2.5 MILLIGRAM(S): 2.5 TABLET ORAL at 05:23

## 2018-01-01 RX ADMIN — SODIUM CHLORIDE 50 MILLILITER(S): 9 INJECTION, SOLUTION INTRAVENOUS at 15:16

## 2018-01-01 RX ADMIN — Medication 0.12 MILLIGRAM(S): at 12:32

## 2018-01-01 RX ADMIN — POTASSIUM PHOSPHATE, MONOBASIC POTASSIUM PHOSPHATE, DIBASIC 62.5 MILLIMOLE(S): 236; 224 INJECTION, SOLUTION INTRAVENOUS at 11:23

## 2018-01-01 RX ADMIN — HALOPERIDOL DECANOATE 2 MILLIGRAM(S): 100 INJECTION INTRAMUSCULAR at 09:58

## 2018-01-01 RX ADMIN — AMPICILLIN SODIUM AND SULBACTAM SODIUM 100 GRAM(S): 250; 125 INJECTION, POWDER, FOR SUSPENSION INTRAMUSCULAR; INTRAVENOUS at 21:18

## 2018-01-01 RX ADMIN — HEPARIN SODIUM 5000 UNIT(S): 5000 INJECTION INTRAVENOUS; SUBCUTANEOUS at 21:54

## 2018-01-01 RX ADMIN — NYSTATIN CREAM 1 APPLICATION(S): 100000 CREAM TOPICAL at 17:54

## 2018-01-01 RX ADMIN — AMIODARONE HYDROCHLORIDE 400 MILLIGRAM(S): 400 TABLET ORAL at 05:07

## 2018-01-01 RX ADMIN — Medication 1 TABLET(S): at 13:23

## 2018-01-01 RX ADMIN — Medication 1 PUFF(S): at 14:17

## 2018-01-01 RX ADMIN — HEPARIN SODIUM 5000 UNIT(S): 5000 INJECTION INTRAVENOUS; SUBCUTANEOUS at 05:34

## 2018-01-01 RX ADMIN — NYSTATIN CREAM 1 APPLICATION(S): 100000 CREAM TOPICAL at 06:14

## 2018-01-01 RX ADMIN — Medication 1 PUFF(S): at 21:06

## 2018-01-01 RX ADMIN — Medication 2.5 MILLIGRAM(S): at 12:15

## 2018-01-01 RX ADMIN — OCTREOTIDE ACETATE 100 MICROGRAM(S): 200 INJECTION, SOLUTION INTRAVENOUS; SUBCUTANEOUS at 13:59

## 2018-01-01 RX ADMIN — Medication 50 MILLIEQUIVALENT(S): at 17:56

## 2018-01-01 RX ADMIN — DEXMEDETOMIDINE HYDROCHLORIDE IN 0.9% SODIUM CHLORIDE 10.2 MICROGRAM(S)/KG/HR: 4 INJECTION INTRAVENOUS at 11:20

## 2018-01-01 RX ADMIN — SODIUM CHLORIDE 75 MILLILITER(S): 9 INJECTION, SOLUTION INTRAVENOUS at 02:42

## 2018-01-01 RX ADMIN — Medication 1 TABLET(S): at 13:59

## 2018-01-01 RX ADMIN — Medication 1 TABLET(S): at 21:50

## 2018-01-01 RX ADMIN — NYSTATIN CREAM 1 APPLICATION(S): 100000 CREAM TOPICAL at 17:46

## 2018-01-01 RX ADMIN — Medication 1 PUFF(S): at 14:19

## 2018-01-01 RX ADMIN — PANTOPRAZOLE SODIUM 40 MILLIGRAM(S): 20 TABLET, DELAYED RELEASE ORAL at 16:09

## 2018-01-01 RX ADMIN — Medication 300 MILLILITER(S): at 18:48

## 2018-01-01 RX ADMIN — SODIUM CHLORIDE 75 MILLILITER(S): 9 INJECTION, SOLUTION INTRAVENOUS at 08:26

## 2018-01-01 RX ADMIN — HEPARIN SODIUM 5000 UNIT(S): 5000 INJECTION INTRAVENOUS; SUBCUTANEOUS at 22:07

## 2018-01-01 RX ADMIN — Medication 2.5 MILLIGRAM(S): at 17:25

## 2018-01-01 RX ADMIN — OCTREOTIDE ACETATE 100 MICROGRAM(S): 200 INJECTION, SOLUTION INTRAVENOUS; SUBCUTANEOUS at 05:43

## 2018-01-01 RX ADMIN — NYSTATIN CREAM 1 APPLICATION(S): 100000 CREAM TOPICAL at 17:10

## 2018-01-01 RX ADMIN — Medication 25 MILLIGRAM(S): at 05:22

## 2018-01-01 RX ADMIN — INSULIN GLARGINE 8 UNIT(S): 100 INJECTION, SOLUTION SUBCUTANEOUS at 22:20

## 2018-01-01 RX ADMIN — PANTOPRAZOLE SODIUM 40 MILLIGRAM(S): 20 TABLET, DELAYED RELEASE ORAL at 05:23

## 2018-01-01 RX ADMIN — Medication 2.5 MILLIGRAM(S): at 02:49

## 2018-01-01 RX ADMIN — ALBUTEROL 2 PUFF(S): 90 AEROSOL, METERED ORAL at 09:11

## 2018-01-01 RX ADMIN — Medication 100 GRAM(S): at 22:06

## 2018-01-01 RX ADMIN — ALBUTEROL 2 PUFF(S): 90 AEROSOL, METERED ORAL at 18:36

## 2018-01-01 RX ADMIN — AMIODARONE HYDROCHLORIDE 16.67 MG/MIN: 400 TABLET ORAL at 04:10

## 2018-01-01 RX ADMIN — CEFEPIME 100 MILLIGRAM(S): 1 INJECTION, POWDER, FOR SOLUTION INTRAMUSCULAR; INTRAVENOUS at 12:15

## 2018-01-01 RX ADMIN — SODIUM CHLORIDE 50 MILLILITER(S): 9 INJECTION, SOLUTION INTRAVENOUS at 10:29

## 2018-01-01 RX ADMIN — Medication 0.12 MILLIGRAM(S): at 06:38

## 2018-01-01 RX ADMIN — Medication 0.12 MILLIGRAM(S): at 07:02

## 2018-01-01 RX ADMIN — Medication 1 TABLET(S): at 13:34

## 2018-01-01 RX ADMIN — NYSTATIN CREAM 1 APPLICATION(S): 100000 CREAM TOPICAL at 17:48

## 2018-01-01 RX ADMIN — AMPICILLIN SODIUM AND SULBACTAM SODIUM 100 GRAM(S): 250; 125 INJECTION, POWDER, FOR SUSPENSION INTRAMUSCULAR; INTRAVENOUS at 05:37

## 2018-01-01 RX ADMIN — PROPOFOL 2.45 MICROGRAM(S)/KG/MIN: 10 INJECTION, EMULSION INTRAVENOUS at 09:00

## 2018-01-01 RX ADMIN — Medication 25 MILLIGRAM(S): at 05:52

## 2018-01-01 RX ADMIN — PANTOPRAZOLE SODIUM 40 MILLIGRAM(S): 20 TABLET, DELAYED RELEASE ORAL at 05:27

## 2018-01-01 RX ADMIN — NYSTATIN CREAM 1 APPLICATION(S): 100000 CREAM TOPICAL at 06:43

## 2018-01-01 RX ADMIN — INSULIN GLARGINE 8 UNIT(S): 100 INJECTION, SOLUTION SUBCUTANEOUS at 22:33

## 2018-01-01 RX ADMIN — Medication 2.5 MILLIGRAM(S): at 01:59

## 2018-01-01 RX ADMIN — PROPOFOL 2.45 MICROGRAM(S)/KG/MIN: 10 INJECTION, EMULSION INTRAVENOUS at 05:20

## 2018-01-01 RX ADMIN — Medication 25 MILLIGRAM(S): at 06:05

## 2018-01-01 RX ADMIN — Medication 2.5 MILLIGRAM(S): at 22:21

## 2018-01-01 RX ADMIN — Medication 2.5 MILLIGRAM(S): at 14:06

## 2018-01-01 RX ADMIN — Medication 1: at 17:30

## 2018-01-01 RX ADMIN — Medication 1 TABLET(S): at 05:58

## 2018-01-01 RX ADMIN — PANTOPRAZOLE SODIUM 40 MILLIGRAM(S): 20 TABLET, DELAYED RELEASE ORAL at 07:02

## 2018-01-01 RX ADMIN — Medication 25 MILLIGRAM(S): at 13:53

## 2018-01-01 RX ADMIN — Medication 1 TABLET(S): at 21:01

## 2018-01-01 RX ADMIN — Medication 1 TABLET(S): at 13:48

## 2018-01-01 RX ADMIN — Medication 1: at 17:40

## 2018-01-01 RX ADMIN — AMPICILLIN SODIUM AND SULBACTAM SODIUM 100 GRAM(S): 250; 125 INJECTION, POWDER, FOR SUSPENSION INTRAMUSCULAR; INTRAVENOUS at 05:22

## 2018-01-01 RX ADMIN — ALBUTEROL 2 PUFF(S): 90 AEROSOL, METERED ORAL at 05:26

## 2018-01-01 RX ADMIN — Medication 100 GRAM(S): at 13:33

## 2018-01-01 RX ADMIN — LACTULOSE 10 GRAM(S): 10 SOLUTION ORAL at 22:23

## 2018-01-01 RX ADMIN — NYSTATIN CREAM 1 APPLICATION(S): 100000 CREAM TOPICAL at 05:47

## 2018-01-01 RX ADMIN — Medication 40 MILLIEQUIVALENT(S): at 13:04

## 2018-01-01 RX ADMIN — NYSTATIN CREAM 1 APPLICATION(S): 100000 CREAM TOPICAL at 17:56

## 2018-01-01 RX ADMIN — PHENYLEPHRINE HYDROCHLORIDE 7.65 MICROGRAM(S)/KG/MIN: 10 INJECTION INTRAVENOUS at 21:13

## 2018-01-01 RX ADMIN — Medication 30 MILLIGRAM(S): at 04:10

## 2018-01-01 RX ADMIN — Medication 1 TABLET(S): at 05:38

## 2018-01-01 RX ADMIN — Medication 0.12 MILLIGRAM(S): at 05:41

## 2018-01-01 RX ADMIN — Medication 1 TABLET(S): at 13:14

## 2018-01-01 RX ADMIN — Medication 1: at 12:15

## 2018-01-01 RX ADMIN — HEPARIN SODIUM 5000 UNIT(S): 5000 INJECTION INTRAVENOUS; SUBCUTANEOUS at 21:18

## 2018-01-01 RX ADMIN — HEPARIN SODIUM 0 UNIT(S)/HR: 5000 INJECTION INTRAVENOUS; SUBCUTANEOUS at 07:38

## 2018-01-01 RX ADMIN — Medication 10 MILLIGRAM(S): at 12:07

## 2018-01-01 RX ADMIN — Medication 1 PUFF(S): at 10:18

## 2018-01-01 RX ADMIN — PANTOPRAZOLE SODIUM 40 MILLIGRAM(S): 20 TABLET, DELAYED RELEASE ORAL at 17:12

## 2018-01-01 RX ADMIN — NYSTATIN CREAM 1 APPLICATION(S): 100000 CREAM TOPICAL at 06:30

## 2018-01-01 RX ADMIN — NYSTATIN CREAM 1 APPLICATION(S): 100000 CREAM TOPICAL at 05:42

## 2018-01-01 RX ADMIN — Medication 100 MILLIEQUIVALENT(S): at 17:37

## 2018-01-01 RX ADMIN — Medication 1 TABLET(S): at 05:22

## 2018-01-01 RX ADMIN — Medication 30 MILLIGRAM(S): at 15:25

## 2018-01-01 RX ADMIN — LISINOPRIL 2.5 MILLIGRAM(S): 2.5 TABLET ORAL at 05:28

## 2018-01-01 RX ADMIN — ALBUTEROL 2 PUFF(S): 90 AEROSOL, METERED ORAL at 18:12

## 2018-01-01 RX ADMIN — HEPARIN SODIUM 1500 UNIT(S)/HR: 5000 INJECTION INTRAVENOUS; SUBCUTANEOUS at 14:05

## 2018-01-01 RX ADMIN — Medication 30 MILLIGRAM(S): at 11:16

## 2018-01-01 RX ADMIN — Medication 40 MILLIEQUIVALENT(S): at 11:23

## 2018-01-01 RX ADMIN — PANTOPRAZOLE SODIUM 40 MILLIGRAM(S): 20 TABLET, DELAYED RELEASE ORAL at 05:53

## 2018-01-01 RX ADMIN — Medication 40 MILLIEQUIVALENT(S): at 11:31

## 2018-01-01 RX ADMIN — ALBUTEROL 2 PUFF(S): 90 AEROSOL, METERED ORAL at 14:17

## 2018-01-01 RX ADMIN — SODIUM CHLORIDE 2000 MILLILITER(S): 9 INJECTION INTRAMUSCULAR; INTRAVENOUS; SUBCUTANEOUS at 12:53

## 2018-01-01 RX ADMIN — ALBUTEROL 2 PUFF(S): 90 AEROSOL, METERED ORAL at 18:15

## 2018-01-01 RX ADMIN — NYSTATIN CREAM 1 APPLICATION(S): 100000 CREAM TOPICAL at 17:34

## 2018-01-01 RX ADMIN — ALBUTEROL 2 PUFF(S): 90 AEROSOL, METERED ORAL at 09:46

## 2018-01-01 RX ADMIN — SODIUM CHLORIDE 50 MILLILITER(S): 9 INJECTION, SOLUTION INTRAVENOUS at 22:00

## 2018-01-01 RX ADMIN — Medication 1 PUFF(S): at 18:37

## 2018-01-01 RX ADMIN — Medication 1: at 17:58

## 2018-01-01 RX ADMIN — HEPARIN SODIUM 1500 UNIT(S)/HR: 5000 INJECTION INTRAVENOUS; SUBCUTANEOUS at 19:15

## 2018-01-01 RX ADMIN — Medication 1 TABLET(S): at 22:03

## 2018-01-01 RX ADMIN — Medication 100 MILLIEQUIVALENT(S): at 18:57

## 2018-01-01 RX ADMIN — CEFTRIAXONE 100 GRAM(S): 500 INJECTION, POWDER, FOR SOLUTION INTRAMUSCULAR; INTRAVENOUS at 13:28

## 2018-01-01 RX ADMIN — Medication 1 TABLET(S): at 05:21

## 2018-01-01 RX ADMIN — PANTOPRAZOLE SODIUM 40 MILLIGRAM(S): 20 TABLET, DELAYED RELEASE ORAL at 06:05

## 2018-01-01 RX ADMIN — Medication 1 PUFF(S): at 18:15

## 2018-01-01 RX ADMIN — PANTOPRAZOLE SODIUM 40 MILLIGRAM(S): 20 TABLET, DELAYED RELEASE ORAL at 05:29

## 2018-01-01 RX ADMIN — Medication 1 TABLET(S): at 22:04

## 2018-01-01 RX ADMIN — Medication 1 TABLET(S): at 06:05

## 2018-01-01 RX ADMIN — HEPARIN SODIUM 5000 UNIT(S): 5000 INJECTION INTRAVENOUS; SUBCUTANEOUS at 13:04

## 2018-01-01 RX ADMIN — Medication 25 MILLIGRAM(S): at 05:39

## 2018-01-01 RX ADMIN — SEVELAMER CARBONATE 1600 MILLIGRAM(S): 2400 POWDER, FOR SUSPENSION ORAL at 07:58

## 2018-01-01 RX ADMIN — AMIODARONE HYDROCHLORIDE 400 MILLIGRAM(S): 400 TABLET ORAL at 17:14

## 2018-01-01 RX ADMIN — PANTOPRAZOLE SODIUM 40 MILLIGRAM(S): 20 TABLET, DELAYED RELEASE ORAL at 05:28

## 2018-01-01 RX ADMIN — Medication 2.5 MILLIGRAM(S): at 11:07

## 2018-01-01 RX ADMIN — Medication 25 MILLIGRAM(S): at 05:24

## 2018-01-01 RX ADMIN — Medication 100 MILLIGRAM(S): at 19:00

## 2018-01-01 RX ADMIN — Medication 1 TABLET(S): at 05:46

## 2018-01-01 RX ADMIN — SODIUM CHLORIDE 50 MILLILITER(S): 9 INJECTION, SOLUTION INTRAVENOUS at 18:48

## 2018-01-01 RX ADMIN — Medication 2.5 MILLIGRAM(S): at 05:20

## 2018-01-01 RX ADMIN — Medication 25 MILLIGRAM(S): at 07:15

## 2018-01-01 RX ADMIN — SODIUM CHLORIDE 2000 MILLILITER(S): 9 INJECTION INTRAMUSCULAR; INTRAVENOUS; SUBCUTANEOUS at 12:42

## 2018-01-01 RX ADMIN — Medication 30 MILLIGRAM(S): at 14:33

## 2018-01-01 RX ADMIN — NYSTATIN CREAM 1 APPLICATION(S): 100000 CREAM TOPICAL at 05:22

## 2018-01-01 RX ADMIN — Medication 100 GRAM(S): at 16:10

## 2018-01-01 RX ADMIN — Medication 650 MILLIGRAM(S): at 12:14

## 2018-01-01 RX ADMIN — AMPICILLIN SODIUM AND SULBACTAM SODIUM 100 GRAM(S): 250; 125 INJECTION, POWDER, FOR SUSPENSION INTRAMUSCULAR; INTRAVENOUS at 17:12

## 2018-01-01 RX ADMIN — Medication 1 TABLET(S): at 21:48

## 2018-01-01 RX ADMIN — Medication 1 TABLET(S): at 21:13

## 2018-01-01 RX ADMIN — Medication 100 GRAM(S): at 13:59

## 2018-01-01 RX ADMIN — DEXMEDETOMIDINE HYDROCHLORIDE IN 0.9% SODIUM CHLORIDE 10.2 MICROGRAM(S)/KG/HR: 4 INJECTION INTRAVENOUS at 16:00

## 2018-01-01 RX ADMIN — PANTOPRAZOLE SODIUM 40 MILLIGRAM(S): 20 TABLET, DELAYED RELEASE ORAL at 06:32

## 2018-01-01 RX ADMIN — AMPICILLIN SODIUM AND SULBACTAM SODIUM 100 GRAM(S): 250; 125 INJECTION, POWDER, FOR SUSPENSION INTRAMUSCULAR; INTRAVENOUS at 05:27

## 2018-01-01 RX ADMIN — Medication 1 TABLET(S): at 06:38

## 2018-01-01 RX ADMIN — Medication 1 TABLET(S): at 05:28

## 2018-01-01 RX ADMIN — Medication 100 GRAM(S): at 05:32

## 2018-01-01 RX ADMIN — Medication 1: at 12:58

## 2018-01-01 RX ADMIN — AMIODARONE HYDROCHLORIDE 33.33 MG/MIN: 400 TABLET ORAL at 19:00

## 2018-01-01 RX ADMIN — HEPARIN SODIUM 1500 UNIT(S)/HR: 5000 INJECTION INTRAVENOUS; SUBCUTANEOUS at 07:42

## 2018-01-01 RX ADMIN — Medication 2: at 12:27

## 2018-01-01 RX ADMIN — SEVELAMER CARBONATE 800 MILLIGRAM(S): 2400 POWDER, FOR SUSPENSION ORAL at 18:01

## 2018-01-01 RX ADMIN — ALBUTEROL 2 PUFF(S): 90 AEROSOL, METERED ORAL at 15:42

## 2018-01-01 RX ADMIN — SODIUM CHLORIDE 100 MILLILITER(S): 9 INJECTION, SOLUTION INTRAVENOUS at 21:00

## 2018-01-01 RX ADMIN — PANTOPRAZOLE SODIUM 40 MILLIGRAM(S): 20 TABLET, DELAYED RELEASE ORAL at 21:21

## 2018-01-01 RX ADMIN — AMPICILLIN SODIUM AND SULBACTAM SODIUM 100 GRAM(S): 250; 125 INJECTION, POWDER, FOR SUSPENSION INTRAMUSCULAR; INTRAVENOUS at 17:15

## 2018-01-01 RX ADMIN — Medication 40 MILLIEQUIVALENT(S): at 23:15

## 2018-01-01 RX ADMIN — LISINOPRIL 2.5 MILLIGRAM(S): 2.5 TABLET ORAL at 05:46

## 2018-01-01 RX ADMIN — Medication 1 TABLET(S): at 05:27

## 2018-01-01 RX ADMIN — Medication 1 PUFF(S): at 02:29

## 2018-01-01 RX ADMIN — PANTOPRAZOLE SODIUM 40 MILLIGRAM(S): 20 TABLET, DELAYED RELEASE ORAL at 17:47

## 2018-01-01 RX ADMIN — Medication 2: at 06:48

## 2018-01-01 RX ADMIN — Medication 0.12 MILLIGRAM(S): at 05:31

## 2018-01-01 RX ADMIN — SEVELAMER CARBONATE 1600 MILLIGRAM(S): 2400 POWDER, FOR SUSPENSION ORAL at 11:36

## 2018-01-01 RX ADMIN — AMPICILLIN SODIUM AND SULBACTAM SODIUM 100 GRAM(S): 250; 125 INJECTION, POWDER, FOR SUSPENSION INTRAMUSCULAR; INTRAVENOUS at 17:36

## 2018-01-01 RX ADMIN — HEPARIN SODIUM 5000 UNIT(S): 5000 INJECTION INTRAVENOUS; SUBCUTANEOUS at 05:24

## 2018-01-01 RX ADMIN — Medication 2.5 MILLIGRAM(S): at 17:12

## 2018-01-01 RX ADMIN — Medication 1 TABLET(S): at 22:06

## 2018-01-01 RX ADMIN — Medication 1 PUFF(S): at 05:13

## 2018-01-01 RX ADMIN — Medication 1 PUFF(S): at 06:11

## 2018-01-01 RX ADMIN — INSULIN HUMAN 10 UNIT(S): 100 INJECTION, SOLUTION SUBCUTANEOUS at 16:04

## 2018-01-01 RX ADMIN — LACTULOSE 10 GRAM(S): 10 SOLUTION ORAL at 13:14

## 2018-01-01 RX ADMIN — Medication 3: at 12:05

## 2018-01-01 RX ADMIN — Medication 2.5 MILLIGRAM(S): at 18:10

## 2018-01-01 RX ADMIN — HEPARIN SODIUM 1500 UNIT(S)/HR: 5000 INJECTION INTRAVENOUS; SUBCUTANEOUS at 00:21

## 2018-01-01 RX ADMIN — SODIUM CHLORIDE 75 MILLILITER(S): 9 INJECTION, SOLUTION INTRAVENOUS at 20:49

## 2018-01-01 RX ADMIN — Medication 1: at 17:49

## 2018-01-01 RX ADMIN — DEXMEDETOMIDINE HYDROCHLORIDE IN 0.9% SODIUM CHLORIDE 10.2 MICROGRAM(S)/KG/HR: 4 INJECTION INTRAVENOUS at 13:20

## 2018-01-01 RX ADMIN — Medication 1: at 17:32

## 2018-01-01 RX ADMIN — Medication 1 TABLET(S): at 22:07

## 2018-01-01 RX ADMIN — Medication 0.12 MILLIGRAM(S): at 11:07

## 2018-01-01 RX ADMIN — PROPOFOL 2.45 MICROGRAM(S)/KG/MIN: 10 INJECTION, EMULSION INTRAVENOUS at 00:08

## 2018-01-01 RX ADMIN — Medication 0.12 MILLIGRAM(S): at 05:24

## 2018-01-01 RX ADMIN — HALOPERIDOL DECANOATE 2 MILLIGRAM(S): 100 INJECTION INTRAMUSCULAR at 21:27

## 2018-01-01 RX ADMIN — Medication 100 MILLIEQUIVALENT(S): at 15:48

## 2018-01-01 RX ADMIN — SODIUM CHLORIDE 2000 MILLILITER(S): 9 INJECTION INTRAMUSCULAR; INTRAVENOUS; SUBCUTANEOUS at 12:15

## 2018-01-01 RX ADMIN — Medication 50 GRAM(S): at 12:51

## 2018-01-01 RX ADMIN — Medication 1 PUFF(S): at 18:19

## 2018-01-01 RX ADMIN — LISINOPRIL 2.5 MILLIGRAM(S): 2.5 TABLET ORAL at 06:38

## 2018-01-01 RX ADMIN — INSULIN GLARGINE 8 UNIT(S): 100 INJECTION, SOLUTION SUBCUTANEOUS at 22:21

## 2018-01-01 RX ADMIN — OCTREOTIDE ACETATE 100 MICROGRAM(S): 200 INJECTION, SOLUTION INTRAVENOUS; SUBCUTANEOUS at 22:49

## 2018-01-01 RX ADMIN — ALBUTEROL 2 PUFF(S): 90 AEROSOL, METERED ORAL at 02:29

## 2018-01-01 RX ADMIN — Medication 12.5 MILLIGRAM(S): at 05:07

## 2018-01-01 RX ADMIN — Medication 1 TABLET(S): at 06:12

## 2018-01-01 RX ADMIN — Medication 25 MILLIGRAM(S): at 06:12

## 2018-01-01 RX ADMIN — Medication 25 MILLIGRAM(S): at 05:34

## 2018-01-01 RX ADMIN — Medication 2.5 MILLIGRAM(S): at 21:26

## 2018-01-01 RX ADMIN — PANTOPRAZOLE SODIUM 40 MILLIGRAM(S): 20 TABLET, DELAYED RELEASE ORAL at 17:17

## 2018-01-01 RX ADMIN — OCTREOTIDE ACETATE 100 MICROGRAM(S): 200 INJECTION, SOLUTION INTRAVENOUS; SUBCUTANEOUS at 13:34

## 2018-01-01 RX ADMIN — LISINOPRIL 2.5 MILLIGRAM(S): 2.5 TABLET ORAL at 05:58

## 2018-01-01 RX ADMIN — PANTOPRAZOLE SODIUM 40 MILLIGRAM(S): 20 TABLET, DELAYED RELEASE ORAL at 05:46

## 2018-01-01 RX ADMIN — Medication 25 MILLIGRAM(S): at 05:58

## 2018-01-01 RX ADMIN — Medication 2.5 MILLIGRAM(S): at 04:39

## 2018-01-01 RX ADMIN — HEPARIN SODIUM 5000 UNIT(S): 5000 INJECTION INTRAVENOUS; SUBCUTANEOUS at 13:31

## 2018-01-01 RX ADMIN — ALBUTEROL 2 PUFF(S): 90 AEROSOL, METERED ORAL at 05:38

## 2018-01-01 RX ADMIN — Medication 0.12 MILLIGRAM(S): at 05:33

## 2018-01-01 RX ADMIN — Medication 1 TABLET(S): at 14:56

## 2018-01-01 RX ADMIN — Medication 0.12 MILLIGRAM(S): at 05:38

## 2018-01-01 RX ADMIN — ALBUTEROL 2 PUFF(S): 90 AEROSOL, METERED ORAL at 14:01

## 2018-01-01 RX ADMIN — SEVELAMER CARBONATE 1600 MILLIGRAM(S): 2400 POWDER, FOR SUSPENSION ORAL at 17:39

## 2018-01-01 RX ADMIN — Medication 2.5 MILLIGRAM(S): at 12:04

## 2018-01-01 RX ADMIN — FENTANYL CITRATE 50 MICROGRAM(S): 50 INJECTION INTRAVENOUS at 14:29

## 2018-01-01 RX ADMIN — Medication 1 TABLET(S): at 22:51

## 2018-01-01 RX ADMIN — AMPICILLIN SODIUM AND SULBACTAM SODIUM 100 GRAM(S): 250; 125 INJECTION, POWDER, FOR SUSPENSION INTRAMUSCULAR; INTRAVENOUS at 05:33

## 2018-01-01 RX ADMIN — SODIUM CHLORIDE 3 MILLILITER(S): 9 INJECTION INTRAMUSCULAR; INTRAVENOUS; SUBCUTANEOUS at 12:52

## 2018-01-01 RX ADMIN — MORPHINE SULFATE 1 MILLIGRAM(S): 50 CAPSULE, EXTENDED RELEASE ORAL at 12:44

## 2018-01-01 RX ADMIN — Medication 4000 MILLILITER(S): at 17:31

## 2018-01-01 RX ADMIN — Medication 1 TABLET(S): at 06:07

## 2018-01-01 RX ADMIN — Medication 1 TABLET(S): at 06:31

## 2018-01-01 RX ADMIN — Medication 1 TABLET(S): at 22:33

## 2018-01-01 RX ADMIN — Medication 1 PUFF(S): at 05:26

## 2018-01-01 RX ADMIN — Medication 1 PUFF(S): at 02:49

## 2018-01-01 RX ADMIN — PANTOPRAZOLE SODIUM 40 MILLIGRAM(S): 20 TABLET, DELAYED RELEASE ORAL at 05:42

## 2018-01-01 RX ADMIN — Medication 1 PUFF(S): at 17:16

## 2018-01-01 RX ADMIN — PANTOPRAZOLE SODIUM 40 MILLIGRAM(S): 20 TABLET, DELAYED RELEASE ORAL at 05:22

## 2018-01-01 RX ADMIN — Medication 100 MILLIEQUIVALENT(S): at 12:15

## 2018-01-01 RX ADMIN — Medication 0.5 MILLIGRAM(S): at 15:29

## 2018-01-01 RX ADMIN — Medication 100 GRAM(S): at 22:47

## 2018-01-01 RX ADMIN — Medication 25 MILLIGRAM(S): at 05:41

## 2018-01-01 RX ADMIN — Medication 1 TABLET(S): at 14:02

## 2018-01-01 RX ADMIN — PANTOPRAZOLE SODIUM 40 MILLIGRAM(S): 20 TABLET, DELAYED RELEASE ORAL at 05:31

## 2018-01-01 RX ADMIN — PANTOPRAZOLE SODIUM 40 MILLIGRAM(S): 20 TABLET, DELAYED RELEASE ORAL at 17:14

## 2018-01-01 RX ADMIN — Medication 0.12 MILLIGRAM(S): at 05:58

## 2018-01-01 RX ADMIN — AMPICILLIN SODIUM AND SULBACTAM SODIUM 100 GRAM(S): 250; 125 INJECTION, POWDER, FOR SUSPENSION INTRAMUSCULAR; INTRAVENOUS at 23:34

## 2018-01-01 RX ADMIN — AMIODARONE HYDROCHLORIDE 600 MILLIGRAM(S): 400 TABLET ORAL at 16:25

## 2018-01-01 RX ADMIN — Medication 2.5 MILLIGRAM(S): at 06:32

## 2018-01-01 RX ADMIN — PANTOPRAZOLE SODIUM 40 MILLIGRAM(S): 20 TABLET, DELAYED RELEASE ORAL at 17:16

## 2018-01-01 RX ADMIN — Medication 1 TABLET(S): at 21:19

## 2018-01-01 RX ADMIN — SODIUM CHLORIDE 75 MILLILITER(S): 9 INJECTION, SOLUTION INTRAVENOUS at 12:51

## 2018-01-01 RX ADMIN — CEFTRIAXONE 100 GRAM(S): 500 INJECTION, POWDER, FOR SOLUTION INTRAMUSCULAR; INTRAVENOUS at 16:26

## 2018-01-01 RX ADMIN — LISINOPRIL 2.5 MILLIGRAM(S): 2.5 TABLET ORAL at 06:05

## 2018-01-01 RX ADMIN — HALOPERIDOL DECANOATE 0.5 MILLIGRAM(S): 100 INJECTION INTRAMUSCULAR at 11:20

## 2018-01-01 RX ADMIN — Medication 100 MILLIGRAM(S): at 21:13

## 2018-01-01 RX ADMIN — SODIUM CHLORIDE 100 MILLILITER(S): 9 INJECTION, SOLUTION INTRAVENOUS at 05:21

## 2018-01-01 RX ADMIN — Medication 1 TABLET(S): at 05:33

## 2018-01-01 RX ADMIN — Medication 25 MILLIGRAM(S): at 05:38

## 2018-01-01 RX ADMIN — Medication 1 TABLET(S): at 21:44

## 2018-01-01 RX ADMIN — Medication 1 PUFF(S): at 21:58

## 2018-01-01 RX ADMIN — ALBUTEROL 2 PUFF(S): 90 AEROSOL, METERED ORAL at 02:41

## 2018-01-01 RX ADMIN — Medication 30 MILLIGRAM(S): at 11:08

## 2018-01-01 RX ADMIN — POTASSIUM PHOSPHATE, MONOBASIC POTASSIUM PHOSPHATE, DIBASIC 62.5 MILLIMOLE(S): 236; 224 INJECTION, SOLUTION INTRAVENOUS at 12:05

## 2018-01-16 NOTE — H&P ADULT - PROBLEM SELECTOR PLAN 3
in setting of ALONDRA with oliguria- apparent ATN- and lactic acidosis  s/p 4L of IVF  will monitor i/o's  Dr Moya for nephrology

## 2018-01-16 NOTE — ED ADULT TRIAGE NOTE - CHIEF COMPLAINT QUOTE
EMS REPORTS PT WAS FOUND ON FLOOR IN BATHROOM. LAST SEEN NORMAL WAS 2 DAYS AGO. ALSO REPORTS PT WAS CYANOTIC IN THE FEILD

## 2018-01-16 NOTE — ED ADULT TRIAGE NOTE - CCCP TRG CHIEF CMPLNT
see chief complaint quote/EMS REPORTS PT WAS FOUND ON FLOOR IN BATHROOM. LAST SEEN NORMAL WAS 2 DAYS AGO

## 2018-01-16 NOTE — H&P ADULT - PROBLEM SELECTOR PLAN 2
likely in setting of aspiration pneumonitis  pt is longstanding smoker in past; c/w bronchodilators and vent support  RVP and cultures  c/w rocephin/clinda for aspiration coverage

## 2018-01-16 NOTE — H&P ADULT - RS GEN PE MLT RESP DETAILS PC
good air movement/diminished breath sounds, L/airway patent/diminished breath sounds, R/respirations labored/rhonchi/wheezes

## 2018-01-16 NOTE — H&P ADULT - PROBLEM SELECTOR PLAN 4
unknown if new or old  initiation amiodarone IVPB and will likely infuse  will initiate heparin gtt for CHADSVASC of 2+  will obtain echo and ischemic eval

## 2018-01-16 NOTE — CHART NOTE - NSCHARTNOTEFT_GEN_A_CORE
Spoke with patient's PCP Valentino López [phone].  Patient Has no immediate family members as per PCp, except some relatives in [place] and [place], he walks at baseline with a walker, has PMH of Non-IDDM, HTN, HLD, Hematuria (unknown cause), Spinal stenosis, Medication list has been updated.  Patient's baseline Creatinine is 1.43, GFR 45 and BUN -33. hbA1c 7.2 as of september 14 '2017. Updated pcp on current status of patient.

## 2018-01-16 NOTE — H&P ADULT - PROBLEM SELECTOR PLAN 1
complicated by ALONDRA and acidosis   necessitating pressors  c/w aspiration pna coverage and f/u cultures, RVP  monitor closely

## 2018-01-16 NOTE — CONSULT NOTE ADULT - PROBLEM SELECTOR RECOMMENDATION 7
r/o secondary to htn  -f/u renal sono  -Keep patient euvolemic and renal diet  -Avoid Nephrotoxic Meds/ Agents such as (NSAIDs, IV contrast, Aminoglycosides such as gentamicin, -Gadolinium contrast, Phosphate containing enemas, etc..)  -Adjust Medications according to eGFR

## 2018-01-16 NOTE — CONSULT NOTE ADULT - SUBJECTIVE AND OBJECTIVE BOX
Patient is a 89y Male whom presented to the hospital with resp.distress, now intubated noted to have aliza, hyperkalemia and acidosis.    Medical HPI:  89 M from home alone with no known PMH was BIBEMS for respiratory distress. Hx obtained from ED provider who spoke with pt's superintendent. Pt was found on floor of bathroom cyanotic and tachypneic. Pt had not been seen for 2 days apparently. In ED, pt is alert, awake following most commands but confused and not oriented to place or date. Pt denies all ROS. Pt is febrile when admitted with leukocytosis of >20k with hemoconcentration, lactate of 16, significant renal insufficiency, given 4 L of IVF resuscitation and pt is oliguric, acidemic to 7.28 and bicarb on 13. EKG was Afib and RVR to 140s and pt given 10mg of IV cardizem.   Pt's superintendent arrived and noted that at baseline pt walks with walker and is alert oriented and communicative at baseline. They are unaware of known medical problems, but note that pt smoked heavily in past and drank with some regularity.   In ICU, pt was intubated for airway protection and hemodynamic instability. (2018 13:55)  Renal HPI:  pts current chart reviewed and case discussed with resident  pt s baseline serum cr is unknown at this time  As per micu resident pts family contact info is unknown too at this time  pt is being treated for septic shock on pressor as pt got congested after 4 liters of saline given in ER and pts U/O is poor  no further renal hpi is availble at this time  pts home meds is unknown    PAST MEDICAL & SURGICAL HISTORY:      Home Medications: Reviewed    MEDICATIONS  (STANDING):  ALBUTerol    90 MICROgram(s) HFA Inhaler 2 Puff(s) Inhalation every 4 hours  cefTRIAXone   IVPB 1 Gram(s) IV Intermittent every 24 hours  clindamycin IVPB      clindamycin IVPB 600 milliGRAM(s) IV Intermittent once  clindamycin IVPB 600 milliGRAM(s) IV Intermittent every 8 hours  heparin  Injectable 5000 Unit(s) SubCutaneous every 8 hours  ipratropium 17 MICROgram(s) HFA Inhaler 1 Puff(s) Inhalation every 4 hours  lactobacillus acidophilus 1 Tablet(s) Oral every 8 hours  pantoprazole  Injectable 40 milliGRAM(s) IV Push daily  phenylephrine    Infusion 0.5 MICROgram(s)/kG/Min (7.65 mL/Hr) IV Continuous <Continuous>    MEDICATIONS  (PRN):      Allergies    No Known Allergies    Intolerances        SOCIAL HISTORY:[place]      FAMILY HISTORY:n/c      REVIEW OF SYSTEMS:  Unobtainable  u/o poor   no skin rash or leg edema noted    Vital Signs  T(F): 102 (18 @ 12:06), Max: 102 (18 @ 12:06)  HR: 151 (18 @ 16:30) (101 - 151)  BP: 126/102 (18 @ 16:00) (89/39 - 146/108)  ABP: --  RR: 21 (18 @ 16:30) (21 - 34)  SpO2: 100% (18 @ 16:30) (92% - 100%)  Wt(kg): --  CVP(cm H2O): --  CO: --  PCWP: --    I and O's:     @ 07:01  -   @ 17:25  --------------------------------------------------------  IN:  Total IN: 0 mL    OUT:    Indwelling Catheter - Urethral: 5 mL  Total OUT: 5 mL    Total NET: -5 mL        Daily Height in cm: 162.56 (2018 11:16)    Daily     PHYSICAL EXAM:  Constitutional: well developed, well nourished  and in nad  HEENT: PERRLA,  no icteric sclera and mild pallor of conjunctiva noted  Neck: No JVD, thyromegaly or adenopathy  Respiratory: reduced air entry lower lungs with no rales, wheezing or rhonchi  Cardiovascular: S1 and S2 normally heard  Gastrointestinal: soft, nondistended, nontender and normal bowel sounds heard  Extremities: No peripheral edema noted but mild skin mottling noted in both LE s  Neurological: pt is sedated prior to intubation and unable to assess neuro function  pt does respond to tactile stimuli  : No flank tenderness  Skin: No rashes        LABS:                        14.9   22.7  )-----------( 187      ( 2018 14:26 )             48.5     5.0  --        151<H>  |  119<H>  |  62<H>  ----------------------------<  175<H>  5.9<H>   |  19<L>  |  3.80<H>      148<H>  |  108  |  66<H>  ----------------------------<  179<H>  4.7   |  13<L>  |  4.81<H>    Ca    8.5      2018 14:26  Ca    10.6<H>      2018 11:50  Phos  5.0       Mg     2.7         TPro  6.4  /  Alb  2.5<L>  /  TBili  0.6  /  DBili  x   /  AST  378<H>  /  ALT  113<H>  /  AlkPhos  40    TPro  8.0  /  Alb  3.0<L>  /  TBili  0.7  /  DBili  x   /  AST  247<H>  /  ALT  81<H>  /  AlkPhos  49            URINE STUDIES:  Urinalysis Basic - ( 2018 14:23 )    Color: Yellow / Appearance: Slightly Turbid / S.025 / pH: x  Gluc: x / Ketone: Trace  / Bili: Moderate / Urobili: Negative   Blood: x / Protein: 100 / Nitrite: Negative   Leuk Esterase: Trace / RBC: 25-50 /HPF / WBC 6-10 /HPF   Sq Epi: x / Non Sq Epi: Few /HPF / Bacteria: Many /HPF        Creatinine, Random Urine: 206 mg/dL (18 @ 14:23)  Sodium, Random Urine: 31 mmol/L (18 @ 14:23)  Osmolality, Random Urine: 417 mos/kg (18 @ 14:23)                RADIOLOGY & ADDITIONAL STUDIES:    < from: Xray Chest 1 View AP/PA (18 @ 13:21) >  EXAM:  XR CHEST AP OR PA 1V                            PROCEDURE DATE:  2018          INTERPRETATION:  CLINICAL STATEMENT: Chest Pain.    TECHNIQUE: AP view of the chest.    COMPARISON: None available.    FINDINGS/  IMPRESSION:  Study limiteddue to rotation.    Elevation left hemidiaphragm. Mild increased interstitial lung markings.   No consolidation or pleural effusion.    Heart size cannot be accurately assessed in this projection.      < end of copied text > Patient is a 89y Male whom presented to the hospital with resp.distress, now intubated noted to have aliza, hyperkalemia and acidosis.    Medical HPI:  89 M from home alone with no known PMH was BIBEMS for respiratory distress. Hx obtained from ED provider who spoke with pt's superintendent. Pt was found on floor of bathroom cyanotic and tachypneic. Pt had not been seen for 2 days apparently. In ED, pt is alert, awake following most commands but confused and not oriented to place or date. Pt denies all ROS. Pt is febrile when admitted with leukocytosis of >20k with hemoconcentration, lactate of 16, significant renal insufficiency, given 4 L of IVF resuscitation and pt is oliguric, acidemic to 7.28 and bicarb on 13. EKG was Afib and RVR to 140s and pt given 10mg of IV cardizem.   Pt's superintendent arrived and noted that at baseline pt walks with walker and is alert oriented and communicative at baseline. They are unaware of known medical problems, but note that pt smoked heavily in past and drank with some regularity.   In ICU, pt was intubated for airway protection and hemodynamic instability. (2018 13:55)  Renal HPI:  pts current chart reviewed and case discussed with resident  As per pts pcp pt s baseline serum cr is around 1.4 with egfr 45 %(stage 3 )   As per micu resident pts family contact info is unknown too at this time  pt is being treated for septic shock on pressor as pt got congested after 4 liters of saline given in ER and pts U/O is poor  no further renal hpi is availble at this time  pts home meds is unknown    PAST MEDICAL & SURGICAL HISTORY:      Home Medications: Reviewed    MEDICATIONS  (STANDING):  ALBUTerol    90 MICROgram(s) HFA Inhaler 2 Puff(s) Inhalation every 4 hours  cefTRIAXone   IVPB 1 Gram(s) IV Intermittent every 24 hours  clindamycin IVPB      clindamycin IVPB 600 milliGRAM(s) IV Intermittent once  clindamycin IVPB 600 milliGRAM(s) IV Intermittent every 8 hours  heparin  Injectable 5000 Unit(s) SubCutaneous every 8 hours  ipratropium 17 MICROgram(s) HFA Inhaler 1 Puff(s) Inhalation every 4 hours  lactobacillus acidophilus 1 Tablet(s) Oral every 8 hours  pantoprazole  Injectable 40 milliGRAM(s) IV Push daily  phenylephrine    Infusion 0.5 MICROgram(s)/kG/Min (7.65 mL/Hr) IV Continuous <Continuous>    MEDICATIONS  (PRN):      Allergies    No Known Allergies    Intolerances        SOCIAL HISTORY:      FAMILY HISTORY:n/c      REVIEW OF SYSTEMS:  Unobtainable  u/o poor   no skin rash or leg edema noted    Vital Signs  T(F): 102 (18 @ 12:06), Max: 102 (18 @ 12:06)  HR: 151 (18 @ 16:30) (101 - 151)  BP: 126/102 (18 @ 16:00) (89/39 - 146/108)  ABP: --  RR: 21 (18 @ 16:30) (21 - 34)  SpO2: 100% (18 @ 16:30) (92% - 100%)  Wt(kg): --  CVP(cm H2O): --  CO: --  PCWP: --    I and O's:     @ 07:01  -   @ 17:25  --------------------------------------------------------  IN:  Total IN: 0 mL    OUT:    Indwelling Catheter - Urethral: 5 mL  Total OUT: 5 mL    Total NET: -5 mL        Daily Height in cm: 162.56 (2018 11:16)    Daily     PHYSICAL EXAM:  Constitutional: well developed, well nourished  and in nad  HEENT: PERRLA,  no icteric sclera and mild pallor of conjunctiva noted  Neck: No JVD, thyromegaly or adenopathy  Respiratory: reduced air entry lower lungs with no rales, wheezing or rhonchi  Cardiovascular: S1 and S2 normally heard  Gastrointestinal: soft, nondistended, nontender and normal bowel sounds heard  Extremities: No peripheral edema noted but mild skin mottling noted in both LE s  Neurological: pt is sedated prior to intubation and unable to assess neuro function  pt does respond to tactile stimuli  : No flank tenderness  Skin: No rashes        LABS:                        14.9   22.7  )-----------( 187      ( 2018 14:26 )             48.5     5.0  --        151<H>  |  119<H>  |  62<H>  ----------------------------<  175<H>  5.9<H>   |  19<L>  |  3.80<H>      148<H>  |  108  |  66<H>  ----------------------------<  179<H>  4.7   |  13<L>  |  4.81<H>    Ca    8.5      2018 14:26  Ca    10.6<H>      2018 11:50  Phos  5.0       Mg     2.7         TPro  6.4  /  Alb  2.5<L>  /  TBili  0.6  /  DBili  x   /  AST  378<H>  /  ALT  113<H>  /  AlkPhos  40    TPro  8.0  /  Alb  3.0<L>  /  TBili  0.7  /  DBili  x   /  AST  247<H>  /  ALT  81<H>  /  AlkPhos  49            URINE STUDIES:  Urinalysis Basic - ( 2018 14:23 )    Color: Yellow / Appearance: Slightly Turbid / S.025 / pH: x  Gluc: x / Ketone: Trace  / Bili: Moderate / Urobili: Negative   Blood: x / Protein: 100 / Nitrite: Negative   Leuk Esterase: Trace / RBC: 25-50 /HPF / WBC 6-10 /HPF   Sq Epi: x / Non Sq Epi: Few /HPF / Bacteria: Many /HPF        Creatinine, Random Urine: 206 mg/dL (18 @ 14:23)  Sodium, Random Urine: 31 mmol/L (18 @ 14:23)  Osmolality, Random Urine: 417 mos/kg (18 @ 14:23)                RADIOLOGY & ADDITIONAL STUDIES:    < from: Xray Chest 1 View AP/PA (18 @ 13:21) >  EXAM:  XR CHEST AP OR PA 1V                            PROCEDURE DATE:  2018          INTERPRETATION:  CLINICAL STATEMENT: Chest Pain.    TECHNIQUE: AP view of the chest.    COMPARISON: None available.    FINDINGS/  IMPRESSION:  Study limiteddue to rotation.    Elevation left hemidiaphragm. Mild increased interstitial lung markings.   No consolidation or pleural effusion.    Heart size cannot be accurately assessed in this projection.      < end of copied text > Patient is a 89y Male whom presented to the hospital with resp.distress, now intubated noted to have aliza, hyperkalemia and acidosis.    Medical HPI:  89 M from home alone with no known PMH was BIBEMS for respiratory distress. Hx obtained from ED provider who spoke with pt's superintendent. Pt was found on floor of bathroom cyanotic and tachypneic. Pt had not been seen for 2 days apparently. In ED, pt is alert, awake following most commands but confused and not oriented to place or date. Pt denies all ROS. Pt is febrile when admitted with leukocytosis of >20k with hemoconcentration, lactate of 16, significant renal insufficiency, given 4 L of IVF resuscitation and pt is oliguric, acidemic to 7.28 and bicarb on 13. EKG was Afib and RVR to 140s and pt given 10mg of IV cardizem.   Pt's superintendent arrived and noted that at baseline pt walks with walker and is alert oriented and communicative at baseline. They are unaware of known medical problems, but note that pt smoked heavily in past and drank with some regularity.   In ICU, pt was intubated for airway protection and hemodynamic instability. (2018 13:55)  Renal HPI:  pts current chart reviewed and case discussed with resident  As per pts pcp pt s baseline serum cr is around 1.4 with egfr 45 %(stage 3 )   As per micu resident pts family contact info is unknown too at this time  pt is being treated for septic shock on pressor as pt got congested after 4 liters of saline given in ER and pts U/O is poor  no further renal hpi is availble at this time  pts home meds is unknown    PAST MEDICAL & SURGICAL HISTORY:  unknown at this time    Home Medications: Reviewed    MEDICATIONS  (STANDING):  ALBUTerol    90 MICROgram(s) HFA Inhaler 2 Puff(s) Inhalation every 4 hours  cefTRIAXone   IVPB 1 Gram(s) IV Intermittent every 24 hours  clindamycin IVPB      clindamycin IVPB 600 milliGRAM(s) IV Intermittent once  clindamycin IVPB 600 milliGRAM(s) IV Intermittent every 8 hours  heparin  Injectable 5000 Unit(s) SubCutaneous every 8 hours  ipratropium 17 MICROgram(s) HFA Inhaler 1 Puff(s) Inhalation every 4 hours  lactobacillus acidophilus 1 Tablet(s) Oral every 8 hours  pantoprazole  Injectable 40 milliGRAM(s) IV Push daily  phenylephrine    Infusion 0.5 MICROgram(s)/kG/Min (7.65 mL/Hr) IV Continuous <Continuous>    MEDICATIONS  (PRN):      Allergies    No Known Allergies    Intolerances        SOCIAL HISTORY:      FAMILY HISTORY:n/c      REVIEW OF SYSTEMS:  Unobtainable  u/o poor   no skin rash or leg edema noted    Vital Signs  T(F): 102 (18 @ 12:06), Max: 102 (18 @ 12:06)  HR: 151 (18 @ 16:30) (101 - 151)  BP: 126/102 (18 @ 16:00) (89/39 - 146/108)  ABP: --  RR: 21 (18 @ 16:30) (21 - 34)  SpO2: 100% (18 @ 16:30) (92% - 100%)  Wt(kg): --  CVP(cm H2O): --  CO: --  PCWP: --    I and O's:     @ 07:01  -   @ 17:25  --------------------------------------------------------  IN:  Total IN: 0 mL    OUT:    Indwelling Catheter - Urethral: 5 mL  Total OUT: 5 mL    Total NET: -5 mL        Daily Height in cm: 162.56 (2018 11:16)    Daily     PHYSICAL EXAM:  Constitutional: well developed, well nourished  and in nad  HEENT: PERRLA,  no icteric sclera and mild pallor of conjunctiva noted  Neck: No JVD, thyromegaly or adenopathy  Respiratory: reduced air entry lower lungs with no rales, wheezing or rhonchi  Cardiovascular: S1 and S2 normally heard  Gastrointestinal: soft, nondistended, nontender and normal bowel sounds heard  Extremities: No peripheral edema noted but mild skin mottling noted in both LE s  Neurological: pt is sedated prior to intubation and unable to assess neuro function  pt does respond to tactile stimuli  : No flank tenderness  Skin: No rashes        LABS:                        14.9   22.7  )-----------( 187      ( 2018 14:26 )             48.5     5.0  --        151<H>  |  119<H>  |  62<H>  ----------------------------<  175<H>  5.9<H>   |  19<L>  |  3.80<H>      148<H>  |  108  |  66<H>  ----------------------------<  179<H>  4.7   |  13<L>  |  4.81<H>    Ca    8.5      2018 14:26  Ca    10.6<H>      2018 11:50  Phos  5.0       Mg     2.7         TPro  6.4  /  Alb  2.5<L>  /  TBili  0.6  /  DBili  x   /  AST  378<H>  /  ALT  113<H>  /  AlkPhos  40    TPro  8.0  /  Alb  3.0<L>  /  TBili  0.7  /  DBili  x   /  AST  247<H>  /  ALT  81<H>  /  AlkPhos  49            URINE STUDIES:  Urinalysis Basic - ( 2018 14:23 )    Color: Yellow / Appearance: Slightly Turbid / S.025 / pH: x  Gluc: x / Ketone: Trace  / Bili: Moderate / Urobili: Negative   Blood: x / Protein: 100 / Nitrite: Negative   Leuk Esterase: Trace / RBC: 25-50 /HPF / WBC 6-10 /HPF   Sq Epi: x / Non Sq Epi: Few /HPF / Bacteria: Many /HPF        Creatinine, Random Urine: 206 mg/dL (18 @ 14:23)  Sodium, Random Urine: 31 mmol/L (18 @ 14:23)  Osmolality, Random Urine: 417 mos/kg (18 @ 14:23)                RADIOLOGY & ADDITIONAL STUDIES:    < from: Xray Chest 1 View AP/PA (18 @ 13:21) >  EXAM:  XR CHEST AP OR PA 1V                            PROCEDURE DATE:  2018          INTERPRETATION:  CLINICAL STATEMENT: Chest Pain.    TECHNIQUE: AP view of the chest.    COMPARISON: None available.    FINDINGS/  IMPRESSION:  Study limiteddue to rotation.    Elevation left hemidiaphragm. Mild increased interstitial lung markings.   No consolidation or pleural effusion.    Heart size cannot be accurately assessed in this projection.      < end of copied text >

## 2018-01-16 NOTE — H&P ADULT - ATTENDING COMMENTS
89 male with unknown PMH was found down in his apartment, brought to the ED and found to be febrile, with ALONDRA, dehydration, afib with RVR, encephalopathy.  After being fluid resuscitated patient developed acute respiratory failure secondary to pulmonary edema.  Impression:  - ALONDRA/ ATN likely due to dehydration and rhabdomyolysis   - afib with RVR  - acute respiratory failure  - aspiration pneumonitis  - septic shock  Plan:  - patient was intubated in ICU, continue mechanical ventilation  - phenylephrine for septic shock  - empiric abx for possible aspiration  - rate/rhythm control with amiodarone (he is too unstable to be placed on beta blocker or calcium channel blocker given the septic shock, digoxin contraindicated due to renal failure), can start anticoagulation after CT brain   - rule out influenza, obtain sputum and blood cultures   Total cc time 35 min

## 2018-01-16 NOTE — ED ADULT NURSE NOTE - OBJECTIVE STATEMENT
pt BIBEMS for notification found on floor per EMS cyanotic in field pt is lethargic open eyes with tactile stimuli nonverbal respiration shallow and tachypneic RR at 35 at this time on cardiac monitoring with irregular rhythm hr pt BIBEMS for notification found on floor per EMS cyanotic in field pt is lethargic open eyes with tactile stimuli nonverbal respiration shallow and tachypneic RR at 35 at this time on cardiac monitoring with irregular rhythm hr of 146 Ozzy FRANCISCO at bedside

## 2018-01-16 NOTE — H&P ADULT - HISTORY OF PRESENT ILLNESS
89 M from home with no known PMH was BIBEMS for respiratory distress. Hx obtained from ED provider who spoke with pt's superintendent. Pt was found on floor of bathroom cyanotic and tachypneic. Pt had not been seen for 2 days apparently. In ED, pt is alert, awake following most commands but confused and not oriented to place or date. Pt denies all ROS. Pt is febrile when admitted with leukocytosis of >20k with hemoconcentration, lactate of 16, significant renal insufficiency, given 4 L of IVF resuscitation and pt is oliguric. EKG was Afib and RVR to 140s and pt given 10mg of IV cardizem. 89 M from home alone with no known PMH was BIBEMS for respiratory distress. Hx obtained from ED provider who spoke with pt's superintendent. Pt was found on floor of bathroom cyanotic and tachypneic. Pt had not been seen for 2 days apparently. In ED, pt is alert, awake following most commands but confused and not oriented to place or date. Pt denies all ROS. Pt is febrile when admitted with leukocytosis of >20k with hemoconcentration, lactate of 16, significant renal insufficiency, given 4 L of IVF resuscitation and pt is oliguric, acidemic to 7.28 and bicarb on 13. EKG was Afib and RVR to 140s and pt given 10mg of IV cardizem.   Pt's superintendent arrived and noted that at baseline pt walks with walker and is alert oriented and communicative at baseline. They are unaware of known medical problems, but note that pt smoked heavily in past and drank with some regularity.   In ICU, pt was intubated for airway protection and hemodynamic instability.

## 2018-01-16 NOTE — H&P ADULT - ASSESSMENT
89 M with no known PMH is BIBEMS after being found on floor in respiratory distress after 2 days, and with significant metabolic acidosis from ARF with oliguria from dehydration and lactic acidosis due to possible aspiration. Pt admitted to MICU for Acute Respiratory Failure and Septic shock in setting of likely aspiration

## 2018-01-16 NOTE — ED ADULT NURSE REASSESSMENT NOTE - NS ED NURSE REASSESS COMMENT FT1
pt is alert awake oriented to self calm at this time irregular rhythm hr of 147 at this time respiration shallow and regular at 25 at this time ICU team at bedside

## 2018-01-16 NOTE — ED PROVIDER NOTE - OBJECTIVE STATEMENT
89 y/o M pt w/ unknown PMHx was BIB EMS for respiratory distress today. Per EMS pt was found by his superintendant on the floor of the bathroom cyanotic and tachypneic; pt's super called EMS. In ED pt is awake, alert and following commands. HPI is limited secondary to urgent need for intervention.

## 2018-01-16 NOTE — CONSULT NOTE ADULT - PROBLEM SELECTOR RECOMMENDATION 6
-secondary to aliza  -s/p calcium/insulin/dextrose given  -f/u k level now  -keep k >4 and <5  -may need hd if k is very high

## 2018-01-17 NOTE — PROGRESS NOTE ADULT - SUBJECTIVE AND OBJECTIVE BOX
[ ]DNR    [ ]DNI    [ ]MEWS SUSPENDED     [ ]GOALS OF CARE DISCUSSION WITH PATIENT/FAMILY/PROXY:    INTERVAL HPI/OVERNIGHT EVENTS: tachy overnight-> Cardizem and metoprolol was given.     PRESSORS: [ x] YES [ ] NO  WHICH: pheny     ANTIBIOTICS:           rocephin        DATE STARTED:   ANTIBIOTICS:         clindamycin           DATE STARTED:   ANTIBIOTICS:                  DATE STARTED:    ALBUTerol    90 MICROgram(s) HFA Inhaler 2 Puff(s) Inhalation every 4 hours  amiodarone Infusion 0.5 mG/Min IV Continuous <Continuous>  cefTRIAXone   IVPB 1 Gram(s) IV Intermittent every 24 hours  clindamycin IVPB      clindamycin IVPB 600 milliGRAM(s) IV Intermittent every 8 hours  ipratropium 17 MICROgram(s) HFA Inhaler 1 Puff(s) Inhalation every 4 hours  lactobacillus acidophilus 1 Tablet(s) Oral every 8 hours  oseltamivir 30 milliGRAM(s) Oral daily  pantoprazole  Injectable 40 milliGRAM(s) IV Push daily  phenylephrine    Infusion 0.5 MICROgram(s)/kG/Min IV Continuous <Continuous>  propofol Infusion 5 MICROgram(s)/kG/Min IV Continuous <Continuous>      CENTRAL LINE: [x ] YES [ ] NO  LOCATION: [facility]   DATE INSERTED:   REMOVE: [ ] YES [ ] NO  EXPLAIN:    STEPHANIA: [x ] YES [ ] NO    DATE INSERTED:   REMOVE:  [ ] YES [ ] NO  EXPLAIN:    PMH -reviewed admission note, no change since admission  PAST MEDICAL & SURGICAL HISTORY:      T(C): 36.9 (18 @ 08:00), Max: 38.9 (18 @ 12:06)  HR: 122 (18 @ 08:07)  BP: 110/85 (18 @ 08:00)  RR: 21 (18 @ 08:00)  SpO2: 100% (18 @ 08:07)  Wt(kg): --       07:  -   @ 07:00  --------------------------------------------------------  IN: 761.7 mL / OUT: 165 mL / NET: 596.7 mL        PHYSICAL EXAM:    GENERAL: NAD  HEENT: dry mucosa, PERRLA  NECK: supple neck, no JVD  CVS: tachycardic, no RMG  CHEST/LUNG:  CTAx2, no wheezing, no rales, no rhonchi  ABDOMEN:  soft, non tender. no distention, no rigidity   EXTREMITIES:   no edema, no cyanosis   SKIN:  no rashes, no blisters  Neuro: sedated       LABS:  CBC Full  -  ( 2018 04:07 )  WBC Count : 21.4 K/uL  Hemoglobin : 13.9 g/dL  Hematocrit : 45.3 %  Platelet Count - Automated : 200 K/uL  Mean Cell Volume : 99.0 fl  Mean Cell Hemoglobin : 30.4 pg  Mean Cell Hemoglobin Concentration : 30.7 gm/dL  Auto Neutrophil # : 19.6 K/uL  Auto Lymphocyte # : 0.9 K/uL  Auto Monocyte # : 0.8 K/uL  Auto Eosinophil # : 0.0 K/uL  Auto Basophil # : 0.2 K/uL  Auto Neutrophil % : 91.1 %  Auto Lymphocyte % : 4.3 %  Auto Monocyte % : 3.7 %  Auto Eosinophil % : 0.0 %  Auto Basophil % : 0.8 %        150<H>  |  117<H>  |  70<H>  ----------------------------<  193<H>  4.3   |  20<L>  |  3.82<H>    Ca    8.8      2018 04:07  Phos  4.9       Mg     2.5         TPro  5.8<L>  /  Alb  2.2<L>  /  TBili  0.5  /  DBili  x   /  AST  291<H>  /  ALT  131<H>  /  AlkPhos  40  -17    PT/INR - ( 2018 04:07 )   PT: 23.9 sec;   INR: 2.16 ratio         PTT - ( 2018 04:07 )  PTT:37.7 sec  Urinalysis Basic - ( 2018 14:23 )    Color: Yellow / Appearance: Slightly Turbid / S.025 / pH: x  Gluc: x / Ketone: Trace  / Bili: Moderate / Urobili: Negative   Blood: x / Protein: 100 / Nitrite: Negative   Leuk Esterase: Trace / RBC: 25-50 /HPF / WBC 6-10 /HPF   Sq Epi: x / Non Sq Epi: Few /HPF / Bacteria: Many /HPF          RADIOLOGY & ADDITIONAL STUDIES REVIEWED:  ***    CRITICAL CARE TIME SPENT: 35 minutes [ ]DNR    [ ]DNI    [ ]MEWS SUSPENDED     [ ]GOALS OF CARE DISCUSSION WITH PATIENT/FAMILY/PROXY:    INTERVAL HPI/OVERNIGHT EVENTS: tachy overnight-> Cardizem and metoprolol was given.     PRESSORS: [ x] YES [ ] NO  WHICH: pheny     ANTIBIOTICS:           rocephin        DATE STARTED:   ANTIBIOTICS:         clindamycin           DATE STARTED:   ANTIBIOTICS:                  DATE STARTED:    ALBUTerol    90 MICROgram(s) HFA Inhaler 2 Puff(s) Inhalation every 4 hours  amiodarone Infusion 0.5 mG/Min IV Continuous <Continuous>  cefTRIAXone   IVPB 1 Gram(s) IV Intermittent every 24 hours  clindamycin IVPB      clindamycin IVPB 600 milliGRAM(s) IV Intermittent every 8 hours  ipratropium 17 MICROgram(s) HFA Inhaler 1 Puff(s) Inhalation every 4 hours  lactobacillus acidophilus 1 Tablet(s) Oral every 8 hours  oseltamivir 30 milliGRAM(s) Oral daily  pantoprazole  Injectable 40 milliGRAM(s) IV Push daily  phenylephrine    Infusion 0.5 MICROgram(s)/kG/Min IV Continuous <Continuous>  propofol Infusion 5 MICROgram(s)/kG/Min IV Continuous <Continuous>      CENTRAL LINE: [x ] YES [ ] NO  LOCATION: [facility]   DATE INSERTED:   REMOVE: [ ] YES [ ] NO  EXPLAIN:    STEPHANIA: [x ] YES [ ] NO    DATE INSERTED:   REMOVE:  [ ] YES [ ] NO  EXPLAIN:    PMH -reviewed admission note, no change since admission  PAST MEDICAL & SURGICAL HISTORY:      T(C): 36.9 (18 @ 08:00), Max: 38.9 (18 @ 12:06)  HR: 122 (18 @ 08:07)  BP: 110/85 (18 @ 08:00)  RR: 21 (18 @ 08:00)  SpO2: 100% (18 @ 08:07)  Wt(kg): --       07:  -   @ 07:00  --------------------------------------------------------  IN: 761.7 mL / OUT: 165 mL / NET: 596.7 mL        PHYSICAL EXAM:    GENERAL: NAD  HEENT: dry mucosa, PERRLA  NECK: supple neck, no JVD  CVS: tachycardic, no RMG  CHEST/LUNG:  CTAx2, no wheezing, no rales, no rhonchi  ABDOMEN:  soft, non tender. no distention, no rigidity   EXTREMITIES:   no edema, no cyanosis   SKIN:  no rashes, no blisters  Neuro: sedated       LABS:  CBC Full  -  ( 2018 04:07 )  WBC Count : 21.4 K/uL  Hemoglobin : 13.9 g/dL  Hematocrit : 45.3 %  Platelet Count - Automated : 200 K/uL  Mean Cell Volume : 99.0 fl  Mean Cell Hemoglobin : 30.4 pg  Mean Cell Hemoglobin Concentration : 30.7 gm/dL  Auto Neutrophil # : 19.6 K/uL  Auto Lymphocyte # : 0.9 K/uL  Auto Monocyte # : 0.8 K/uL  Auto Eosinophil # : 0.0 K/uL  Auto Basophil # : 0.2 K/uL  Auto Neutrophil % : 91.1 %  Auto Lymphocyte % : 4.3 %  Auto Monocyte % : 3.7 %  Auto Eosinophil % : 0.0 %  Auto Basophil % : 0.8 %        150<H>  |  117<H>  |  70<H>  ----------------------------<  193<H>  4.3   |  20<L>  |  3.82<H>    Ca    8.8      2018 04:07  Phos  4.9       Mg     2.5         TPro  5.8<L>  /  Alb  2.2<L>  /  TBili  0.5  /  DBili  x   /  AST  291<H>  /  ALT  131<H>  /  AlkPhos  40  -17    PT/INR - ( 2018 04:07 )   PT: 23.9 sec;   INR: 2.16 ratio         PTT - ( 2018 04:07 )  PTT:37.7 sec  Urinalysis Basic - ( 2018 14:23 )    Color: Yellow / Appearance: Slightly Turbid / S.025 / pH: x  Gluc: x / Ketone: Trace  / Bili: Moderate / Urobili: Negative   Blood: x / Protein: 100 / Nitrite: Negative   Leuk Esterase: Trace / RBC: 25-50 /HPF / WBC 6-10 /HPF   Sq Epi: x / Non Sq Epi: Few /HPF / Bacteria: Many /HPF          RADIOLOGY & ADDITIONAL STUDIES REVIEWED:      89 M with no known PMH is BIBEMS after being found on floor in respiratory distress after 2 days, and with significant metabolic acidosis from ARF with oliguria from dehydration and lactic acidosis due to possible aspiration. Pt admitted to MICU for Acute Hypoxic  Respiratory Failure and Septic shock in setting of likely aspiration     Problem/Plan - 1:    Shock.     - unknown source   - hypovolemic shock in the differential   -complicated by ALONDRA and acidosis   still on phenylephrine   c/w aspiration PNA coverage   Flu positive --> started on Tamiflu renally dose   f/u Bcx       Problem/Plan - 2:    Acute hypoxic Respiratory failure     likely in setting of aspiration pneumonitis and fluid overload after resuscitation   Flu positive   c/w Tamiflu   c/w Rocephin clindamycin for aspiration coverage.   c/w MV  f/u prolactin to deescalate abx   droplet precaution        Problem/Plan - 3:     ARF    - most likely 2/2  ATN 2/2 dehydration   - pt presented with hemoconcentration and Cr of 4  - Cr improving   - nephro recommended gentle hydration   low UO: 20         Problem/Plan - 4:     Metabolic acidosis.    most likely 2/2 dehydration and ARF   lactate of 14 upon admission   s/p 4L of IVF  acidosis improving  will monitor i/o's and lactate   Dr Moya for nephrology.          Problem/Plan - 4:    ·Problem: Afib.      Plan: unknown if new or old  c/w amiodarone since pt is on shock   heparin started yesterday but pt had BRBPR x 1  will initiate heparin gtt after negative head CT   CE trended down, most likely demand   f/u echo       Problem/Plan - 5:    Prophylactic measure.  Plan: continue Protonix and dvt px with hsq.       CRITICAL CARE TIME SPENT: 35 minutes [ ]DNR    [ ]DNI    [ ]MEWS SUSPENDED     [ ]GOALS OF CARE DISCUSSION WITH PATIENT/FAMILY/PROXY:    INTERVAL HPI/OVERNIGHT EVENTS: tachy overnight-> Cardizem and metoprolol was given.     PRESSORS: [ x] YES [ ] NO  WHICH: pheny     ANTIBIOTICS:           rocephin        DATE STARTED:   ANTIBIOTICS:         clindamycin           DATE STARTED:   ANTIBIOTICS:                  DATE STARTED:    ALBUTerol    90 MICROgram(s) HFA Inhaler 2 Puff(s) Inhalation every 4 hours  amiodarone Infusion 0.5 mG/Min IV Continuous <Continuous>  cefTRIAXone   IVPB 1 Gram(s) IV Intermittent every 24 hours  clindamycin IVPB      clindamycin IVPB 600 milliGRAM(s) IV Intermittent every 8 hours  ipratropium 17 MICROgram(s) HFA Inhaler 1 Puff(s) Inhalation every 4 hours  lactobacillus acidophilus 1 Tablet(s) Oral every 8 hours  oseltamivir 30 milliGRAM(s) Oral daily  pantoprazole  Injectable 40 milliGRAM(s) IV Push daily  phenylephrine    Infusion 0.5 MICROgram(s)/kG/Min IV Continuous <Continuous>  propofol Infusion 5 MICROgram(s)/kG/Min IV Continuous <Continuous>      CENTRAL LINE: [x ] YES [ ] NO  LOCATION: [facility]   DATE INSERTED:   REMOVE: [ ] YES [ ] NO  EXPLAIN:    STEPHANIA: [x ] YES [ ] NO    DATE INSERTED:   REMOVE:  [ ] YES [ ] NO  EXPLAIN:    PMH -reviewed admission note, no change since admission  PAST MEDICAL & SURGICAL HISTORY:      T(C): 36.9 (18 @ 08:00), Max: 38.9 (18 @ 12:06)  HR: 122 (18 @ 08:07)  BP: 110/85 (18 @ 08:00)  RR: 21 (18 @ 08:00)  SpO2: 100% (18 @ 08:07)  Wt(kg): --       07:  -   @ 07:00  --------------------------------------------------------  IN: 761.7 mL / OUT: 165 mL / NET: 596.7 mL        PHYSICAL EXAM:    GENERAL: NAD  HEENT: dry mucosa, PERRLA  NECK: supple neck, no JVD  CVS: tachycardic, no RMG  CHEST/LUNG:  CTAx2, no wheezing, no rales, no rhonchi  ABDOMEN:  soft, non tender. no distention, no rigidity   EXTREMITIES:   no edema, no cyanosis   SKIN:  no rashes, no blisters  Neuro: sedated       LABS:  CBC Full  -  ( 2018 04:07 )  WBC Count : 21.4 K/uL  Hemoglobin : 13.9 g/dL  Hematocrit : 45.3 %  Platelet Count - Automated : 200 K/uL  Mean Cell Volume : 99.0 fl  Mean Cell Hemoglobin : 30.4 pg  Mean Cell Hemoglobin Concentration : 30.7 gm/dL  Auto Neutrophil # : 19.6 K/uL  Auto Lymphocyte # : 0.9 K/uL  Auto Monocyte # : 0.8 K/uL  Auto Eosinophil # : 0.0 K/uL  Auto Basophil # : 0.2 K/uL  Auto Neutrophil % : 91.1 %  Auto Lymphocyte % : 4.3 %  Auto Monocyte % : 3.7 %  Auto Eosinophil % : 0.0 %  Auto Basophil % : 0.8 %        150<H>  |  117<H>  |  70<H>  ----------------------------<  193<H>  4.3   |  20<L>  |  3.82<H>    Ca    8.8      2018 04:07  Phos  4.9       Mg     2.5         TPro  5.8<L>  /  Alb  2.2<L>  /  TBili  0.5  /  DBili  x   /  AST  291<H>  /  ALT  131<H>  /  AlkPhos  40  -17    PT/INR - ( 2018 04:07 )   PT: 23.9 sec;   INR: 2.16 ratio         PTT - ( 2018 04:07 )  PTT:37.7 sec  Urinalysis Basic - ( 2018 14:23 )    Color: Yellow / Appearance: Slightly Turbid / S.025 / pH: x  Gluc: x / Ketone: Trace  / Bili: Moderate / Urobili: Negative   Blood: x / Protein: 100 / Nitrite: Negative   Leuk Esterase: Trace / RBC: 25-50 /HPF / WBC 6-10 /HPF   Sq Epi: x / Non Sq Epi: Few /HPF / Bacteria: Many /HPF          RADIOLOGY & ADDITIONAL STUDIES REVIEWED:      89 M with no known PMH is BIBEMS after being found on floor in respiratory distress after 2 days, and with significant metabolic acidosis from ARF with oliguria from dehydration,  lactic acidosis and fevers. Pt admitted to MICU for Acute Hypoxic  Respiratory Failure and shock in setting of likely sepsis vs hypovolemia      Problem/Plan - 1:    Shock.     - unknown source   - hypovolemic shock in the differential   -complicated by ALONDRA and acidosis   still on phenylephrine   c/w aspiration PNA coverage   Flu positive --> started on Tamiflu renally dose   f/u Bcx       Problem/Plan - 2:    Acute hypoxic Respiratory failure     likely in setting of aspiration pneumonitis and fluid overload after resuscitation   Flu positive   c/w Tamiflu   c/w Rocephin clindamycin for aspiration coverage.   c/w MV  f/u prolactin to deescalate abx   droplet precaution        Problem/Plan - 3:     ARF    - most likely 2/2  ATN 2/2 dehydration   - pt presented with hemoconcentration and Cr of 4  - Cr improving   - nephro recommended gentle hydration   low UO: 20         Problem/Plan - 4:     Metabolic acidosis.    most likely 2/2 dehydration and ARF   lactate of 14 upon admission   s/p 4L of IVF  acidosis improving  will monitor i/o's and lactate   Dr Moya for nephrology.          Problem/Plan - 4:    ·Problem: Afib.      Plan: unknown if new or old  c/w amiodarone since pt is on shock   heparin started yesterday but pt had BRBPR x 1  will initiate heparin gtt after negative head CT   CE trended down, most likely demand   f/u echo       Problem/Plan - 5:    Prophylactic measure.  Plan: continue Protonix and dvt px with hsq.       CRITICAL CARE TIME SPENT: 35 minutes [ ]DNR    [ ]DNI    [ ]MEWS SUSPENDED     [ ]GOALS OF CARE DISCUSSION WITH PATIENT/FAMILY/PROXY:    INTERVAL HPI/OVERNIGHT EVENTS: tachy overnight-> Cardizem and metoprolol was given.     PRESSORS: [ x] YES [ ] NO  WHICH: pheny     ANTIBIOTICS:           rocephin        DATE STARTED:   ANTIBIOTICS:         clindamycin           DATE STARTED:   ANTIBIOTICS:                  DATE STARTED:    ALBUTerol    90 MICROgram(s) HFA Inhaler 2 Puff(s) Inhalation every 4 hours  amiodarone Infusion 0.5 mG/Min IV Continuous <Continuous>  cefTRIAXone   IVPB 1 Gram(s) IV Intermittent every 24 hours  clindamycin IVPB      clindamycin IVPB 600 milliGRAM(s) IV Intermittent every 8 hours  ipratropium 17 MICROgram(s) HFA Inhaler 1 Puff(s) Inhalation every 4 hours  lactobacillus acidophilus 1 Tablet(s) Oral every 8 hours  oseltamivir 30 milliGRAM(s) Oral daily  pantoprazole  Injectable 40 milliGRAM(s) IV Push daily  phenylephrine    Infusion 0.5 MICROgram(s)/kG/Min IV Continuous <Continuous>  propofol Infusion 5 MICROgram(s)/kG/Min IV Continuous <Continuous>      CENTRAL LINE: [x ] YES [ ] NO  LOCATION: [facility]   DATE INSERTED:   REMOVE: [ ] YES [ ] NO  EXPLAIN:    STEPHANIA: [x ] YES [ ] NO    DATE INSERTED:   REMOVE:  [ ] YES [ ] NO  EXPLAIN:    PMH -reviewed admission note, no change since admission  PAST MEDICAL & SURGICAL HISTORY:      T(C): 36.9 (18 @ 08:00), Max: 38.9 (18 @ 12:06)  HR: 122 (18 @ 08:07)  BP: 110/85 (18 @ 08:00)  RR: 21 (18 @ 08:00)  SpO2: 100% (18 @ 08:07)  Wt(kg): --       07:  -   @ 07:00  --------------------------------------------------------  IN: 761.7 mL / OUT: 165 mL / NET: 596.7 mL        PHYSICAL EXAM:    GENERAL: NAD  HEENT: dry mucosa, PERRLA  NECK: supple neck, no JVD  CVS: tachycardic, no RMG  CHEST/LUNG:  CTAx2, no wheezing, no rales, no rhonchi  ABDOMEN:  soft, non tender. no distention, no rigidity   EXTREMITIES:   no edema, no cyanosis   SKIN:  no rashes, no blisters  Neuro: sedated       LABS:  CBC Full  -  ( 2018 04:07 )  WBC Count : 21.4 K/uL  Hemoglobin : 13.9 g/dL  Hematocrit : 45.3 %  Platelet Count - Automated : 200 K/uL  Mean Cell Volume : 99.0 fl  Mean Cell Hemoglobin : 30.4 pg  Mean Cell Hemoglobin Concentration : 30.7 gm/dL  Auto Neutrophil # : 19.6 K/uL  Auto Lymphocyte # : 0.9 K/uL  Auto Monocyte # : 0.8 K/uL  Auto Eosinophil # : 0.0 K/uL  Auto Basophil # : 0.2 K/uL  Auto Neutrophil % : 91.1 %  Auto Lymphocyte % : 4.3 %  Auto Monocyte % : 3.7 %  Auto Eosinophil % : 0.0 %  Auto Basophil % : 0.8 %        150<H>  |  117<H>  |  70<H>  ----------------------------<  193<H>  4.3   |  20<L>  |  3.82<H>    Ca    8.8      2018 04:07  Phos  4.9       Mg     2.5         TPro  5.8<L>  /  Alb  2.2<L>  /  TBili  0.5  /  DBili  x   /  AST  291<H>  /  ALT  131<H>  /  AlkPhos  40  -17    PT/INR - ( 2018 04:07 )   PT: 23.9 sec;   INR: 2.16 ratio         PTT - ( 2018 04:07 )  PTT:37.7 sec  Urinalysis Basic - ( 2018 14:23 )    Color: Yellow / Appearance: Slightly Turbid / S.025 / pH: x  Gluc: x / Ketone: Trace  / Bili: Moderate / Urobili: Negative   Blood: x / Protein: 100 / Nitrite: Negative   Leuk Esterase: Trace / RBC: 25-50 /HPF / WBC 6-10 /HPF   Sq Epi: x / Non Sq Epi: Few /HPF / Bacteria: Many /HPF          RADIOLOGY & ADDITIONAL STUDIES REVIEWED:      89 M with no known PMH is BIBEMS after being found on floor in respiratory distress after 2 days, and with significant metabolic acidosis from ARF with oliguria from dehydration,  lactic acidosis and fevers. Pt admitted to MICU for Acute Hypoxic  Respiratory Failure and shock in setting of likely sepsis vs hypovolemia      Problem/Plan - 1:    Shock.     - unknown source   - hypovolemic shock in the differential   -complicated by ALONDRA and acidosis   still on phenylephrine   c/w aspiration PNA coverage   Flu positive --> started on Tamiflu renally dose   f/u Bcx       Problem/Plan - 2:    Acute hypoxic Respiratory failure     likely in setting of aspiration pneumonitis and fluid overload after resuscitation   Flu positive   c/w Tamiflu   c/w Rocephin clindamycin for aspiration coverage.   c/w MV  f/u prolactin to deescalate abx   droplet precaution        Problem/Plan - 3:     ARF    - most likely 2/2  ATN 2/2 dehydration   - pt presented with hemoconcentration and Cr of 4  - Cr worsening    - nephro recommended gentle hydration   low UO: 20         Problem/Plan - 4:     Metabolic acidosis.    most likely 2/2 dehydration and ARF   lactate of 14 upon admission   s/p 4L of IVF  acidosis improving  will monitor i/o's and lactate   Dr Moya for nephrology.          Problem/Plan - 4:    ·Problem: Afib.      Plan: unknown if new or old  c/w amiodarone since pt is on shock   heparin started yesterday but pt had BRBPR x 1  will initiate heparin gtt after negative head CT   CE trended down, most likely demand   f/u echo       Problem/Plan - 5:    Prophylactic measure.  Plan: continue Protonix and dvt px with hsq.       CRITICAL CARE TIME SPENT: 35 minutes [ ]DNR    [ ]DNI    [ ]MEWS SUSPENDED     [ ]GOALS OF CARE DISCUSSION WITH PATIENT/FAMILY/PROXY:    INTERVAL HPI/OVERNIGHT EVENTS: tachy overnight-> Cardizem and metoprolol was given.     PRESSORS: [ x] YES [ ] NO  WHICH: pheny     ANTIBIOTICS:           rocephin        DATE STARTED:   ANTIBIOTICS:         clindamycin           DATE STARTED:   ANTIBIOTICS:                  DATE STARTED:    ALBUTerol    90 MICROgram(s) HFA Inhaler 2 Puff(s) Inhalation every 4 hours  amiodarone Infusion 0.5 mG/Min IV Continuous <Continuous>  cefTRIAXone   IVPB 1 Gram(s) IV Intermittent every 24 hours  clindamycin IVPB      clindamycin IVPB 600 milliGRAM(s) IV Intermittent every 8 hours  ipratropium 17 MICROgram(s) HFA Inhaler 1 Puff(s) Inhalation every 4 hours  lactobacillus acidophilus 1 Tablet(s) Oral every 8 hours  oseltamivir 30 milliGRAM(s) Oral daily  pantoprazole  Injectable 40 milliGRAM(s) IV Push daily  phenylephrine    Infusion 0.5 MICROgram(s)/kG/Min IV Continuous <Continuous>  propofol Infusion 5 MICROgram(s)/kG/Min IV Continuous <Continuous>      CENTRAL LINE: [x ] YES [ ] NO  LOCATION: [facility]   DATE INSERTED:   REMOVE: [ ] YES [ ] NO  EXPLAIN:    STEPHANIA: [x ] YES [ ] NO    DATE INSERTED:   REMOVE:  [ ] YES [ ] NO  EXPLAIN:    PMH -reviewed admission note, no change since admission  PAST MEDICAL & SURGICAL HISTORY:      T(C): 36.9 (18 @ 08:00), Max: 38.9 (18 @ 12:06)  HR: 122 (18 @ 08:07)  BP: 110/85 (18 @ 08:00)  RR: 21 (18 @ 08:00)  SpO2: 100% (18 @ 08:07)  Wt(kg): --       07:  -   @ 07:00  --------------------------------------------------------  IN: 761.7 mL / OUT: 165 mL / NET: 596.7 mL        PHYSICAL EXAM:    GENERAL: NAD  HEENT: dry mucosa, PERRLA  NECK: supple neck, no JVD  CVS: tachycardic, no RMG  CHEST/LUNG:  CTAx2, no wheezing, no rales, no rhonchi  ABDOMEN:  soft, non tender. no distention, no rigidity   EXTREMITIES:   no edema, no cyanosis   SKIN:  no rashes, no blisters  Neuro: sedated       LABS:  CBC Full  -  ( 2018 04:07 )  WBC Count : 21.4 K/uL  Hemoglobin : 13.9 g/dL  Hematocrit : 45.3 %  Platelet Count - Automated : 200 K/uL  Mean Cell Volume : 99.0 fl  Mean Cell Hemoglobin : 30.4 pg  Mean Cell Hemoglobin Concentration : 30.7 gm/dL  Auto Neutrophil # : 19.6 K/uL  Auto Lymphocyte # : 0.9 K/uL  Auto Monocyte # : 0.8 K/uL  Auto Eosinophil # : 0.0 K/uL  Auto Basophil # : 0.2 K/uL  Auto Neutrophil % : 91.1 %  Auto Lymphocyte % : 4.3 %  Auto Monocyte % : 3.7 %  Auto Eosinophil % : 0.0 %  Auto Basophil % : 0.8 %        150<H>  |  117<H>  |  70<H>  ----------------------------<  193<H>  4.3   |  20<L>  |  3.82<H>    Ca    8.8      2018 04:07  Phos  4.9       Mg     2.5         TPro  5.8<L>  /  Alb  2.2<L>  /  TBili  0.5  /  DBili  x   /  AST  291<H>  /  ALT  131<H>  /  AlkPhos  40  -17    PT/INR - ( 2018 04:07 )   PT: 23.9 sec;   INR: 2.16 ratio         PTT - ( 2018 04:07 )  PTT:37.7 sec  Urinalysis Basic - ( 2018 14:23 )    Color: Yellow / Appearance: Slightly Turbid / S.025 / pH: x  Gluc: x / Ketone: Trace  / Bili: Moderate / Urobili: Negative   Blood: x / Protein: 100 / Nitrite: Negative   Leuk Esterase: Trace / RBC: 25-50 /HPF / WBC 6-10 /HPF   Sq Epi: x / Non Sq Epi: Few /HPF / Bacteria: Many /HPF          RADIOLOGY & ADDITIONAL STUDIES REVIEWED:      < from: Xray Chest 1 View AP -PORTABLE-Routine (18 @ 09:23) >  EXAM:  XR CHEST PORTABLE  ROUTINE 1V                            PROCEDURE DATE:  2018          INTERPRETATION:  CLINICAL STATEMENT: Follow-up chest pain.    TECHNIQUE: AP view of the chest.    COMPARISON: 2018    FINDINGS/  IMPRESSION:  ET tube, feeding tube, right central line again noted. No pneumothorax.    Increased interstitial lung markings without significant change. Small   left pleural effusion without significant change. Bilateral airspace   opacities lung bases new since prior exam.    Heart size cannot be accurately assessed in this projection, but appear   enlarged.          < end of copied text >      89 M with no known PMH is BIBEMS after being found on floor in respiratory distress after 2 days, and with significant metabolic acidosis from ARF with oliguria from dehydration,  lactic acidosis and fevers. Pt admitted to MICU for Acute Hypoxic  Respiratory Failure and shock in setting of likely sepsis vs hypovolemia      Problem/Plan - 1:    Shock.     - unknown source   - hypovolemic shock in the differential   -complicated by ALONDRA and acidosis   still on phenylephrine    CXR: Increased interstitial lung markings without significant change. Small   left pleural effusion without significant change. Bilateral airspace   opacities lung bases new since prior exam.  c/w aspiration PNA coverage  Flu positive --> started on Tamiflu renally dose   f/u Bcx       Problem/Plan - 2:    Acute hypoxic Respiratory failure     likely in setting of aspiration pneumonitis and fluid overload after resuscitation   Flu positive   c/w Tamiflu   c/w Rocephin clindamycin for aspiration coverage.   c/w MV  f/u prolactin to deescalate abx   droplet precaution        Problem/Plan - 3:     ARF    - most likely 2/2  ATN 2/2 dehydration   - pt presented with hemoconcentration and Cr of 4  - Cr worsening    - nephro recommended gentle hydration   low UO: 20         Problem/Plan - 4:     Metabolic acidosis.    most likely 2/2 dehydration and ARF   lactate of 14 upon admission   s/p 4L of IVF  acidosis improving  will monitor i/o's and lactate   Dr Moya for nephrology.          Problem/Plan - 4:    ·Problem: Afib.      Plan: unknown if new or old  c/w amiodarone since pt is on shock   heparin started yesterday but pt had BRBPR x 1  will initiate heparin gtt after negative head CT   CE trended down, most likely demand   f/u echo       Problem/Plan - 5:    Prophylactic measure.  Plan: continue Protonix and dvt px with hsq.       CRITICAL CARE TIME SPENT: 35 minutes

## 2018-01-17 NOTE — PROGRESS NOTE ADULT - ASSESSMENT
89 male from home, found on floor, +ALONDRA, dehydration, afib with RVR, encephalopathy was fluid resuscitated in ED, developed acute respiratory failure secondary to pulmonary edema intubated and admitted to ICU.     1. ALONDRA on CKD from dehydration and rhabdomyolysis; Cr trending down . UO improved from 5cc/hr to 20cc/hr. net +597  - FeNA 1.1; ATN from rhabdo  - continue  gentle iv hydration if bp low with saline if Na level <145 or add 1/2 saline if Na level >145 at 100 ml per hr  - keep SBP>110  - pending renal sono   - Keep patient euvolemic and renal diet  - Avoid Nephrotoxic Meds/ Agents such as (NSAIDs, IV contrast, Aminoglycosides such as gentamicin, -Gadolinium contrast, Phosphate containing enemas, etc..)  - Adjust Medications according to eGFR  - f/u bmp q 6 hrs.    2. Hypernatremia.   -r/o secondary to saline infusion, less likely hypovolemic  -f/u Na level q 12hrs.     3. Hyperkalemia.   - resolved   -secondary to alondra  -s/p calcium/insulin/dextrose given  -keep k >4 and <5  -may need hd if k is very high.    4. DVT, GI ppx 89 male from home, found on floor, +ALONDRA, dehydration, afib with RVR, encephalopathy was fluid resuscitated in ED, developed acute respiratory failure secondary to pulmonary edema intubated and admitted to ICU.     1. ALONDRA on CKD from dehydration and rhabdomyolysis; Cr trending down but increased again. UO improved from 5cc/hr to 20cc/hr. net +597  - FeNA 1.1; ATN from rhabdo  - continue  gentle iv hydration if bp low with saline if Na level <145 or add 1/2 saline if Na level >145 at 100 ml per hr  - keep SBP>110  - pending renal sono   - Keep patient euvolemic and renal diet  - Avoid Nephrotoxic Meds/ Agents such as (NSAIDs, IV contrast, Aminoglycosides such as gentamicin, -Gadolinium contrast, Phosphate containing enemas, etc..)  - Adjust Medications according to eGFR  - f/u bmp q 6 hrs.    2. Hypernatremia.   -r/o secondary to saline infusion, less likely hypovolemic  -f/u Na level q 12hrs.     3. Hyperkalemia.   - resolved   -secondary to alondra  -s/p calcium/insulin/dextrose given  -keep k >4 and <5  -may need hd if k is very high.    4. elevated p: added phoslo     4. Systolic heart failure: care per ICU   5. Influenza: care per ICU team   4. DVT, GI ppx 89 male from home, found on floor, +ALONDRA, dehydration, afib with RVR, encephalopathy was fluid resuscitated in ED, developed acute respiratory failure secondary to pulmonary edema intubated and admitted to ICU.     1. ALONDRA on CKD from dehydration and rhabdomyolysis; Cr trending down but increased again. UO improved from 5cc/hr to 20cc/hr. net +597  - FeNA 1.1; ATN from rhabdo  - continue  gentle iv hydration if bp low with saline if Na level <145 or add 1/2 saline if Na level >145 at 100 ml per hr  - keep SBP>110  - pending renal sono   - Keep patient euvolemic and renal diet  - Avoid Nephrotoxic Meds/ Agents such as (NSAIDs, IV contrast, Aminoglycosides such as gentamicin, -Gadolinium contrast, Phosphate containing enemas, etc..)  - Adjust Medications according to eGFR  - f/u bmp q 6 hrs.  - elevated TG on propofol; monitor     2. Hypernatremia.   -r/o secondary to saline infusion, less likely hypovolemic  -f/u Na level q 12hrs.     3. Hyperkalemia.   - resolved   -secondary to alondra  -s/p calcium/insulin/dextrose given  -keep k >4 and <5  -may need hd if k is very high.    4. elevated P: added Renagel     5. Systolic heart failure: care per ICU   6. Influenza: care per ICU team   7. DVT, GI ppx 89 male from home, found on floor, +ALONDRA, dehydration, afib with RVR, encephalopathy was fluid resuscitated in ED, developed acute respiratory failure secondary to pulmonary edema intubated and admitted to ICU.     1. ALONDRA on CKD from dehydration and rhabdomyolysis; Cr trending down but increased again. UO improved from 5cc/hr to 20cc/hr. net +597  - FeNA 1.1, most likrodriguez pre-renal ,less likrodriguez mild ATN, non oliguric  - continue  gentle iv hydration if bp low with saline if Na level <145 or add 1/2 saline if Na level >145 at 100 ml per hr  - keep SBP>110  - pending renal sono   - Keep patient euvolemic and renal diet  - Avoid Nephrotoxic Meds/ Agents such as (NSAIDs, IV contrast, Aminoglycosides such as gentamicin, -Gadolinium contrast, Phosphate containing enemas, etc..)  - Adjust Medications according to eGFR  - f/u bmp q 6 hrs.  - elevated TG on propofol; monitor   -no need for hd at this time    2. Hypernatremia.   -r/o secondary to saline infusion, less likely hypovolemic  -f/u Na level q 12hrs.     3. Hyperkalemia.   - resolved   -secondary to alondra  -s/p calcium/insulin/dextrose given  -keep k >4 and <5  -may need hd if k is very high.    4. elevated P: added Renagel     5. Systolic heart failure: care per ICU   6. Influenza: care per ICU team   7. DVT, GI ppx

## 2018-01-17 NOTE — PROGRESS NOTE ADULT - ATTENDING COMMENTS
89 male with unknown PMH was found down in his apartment, brought to the ED and found to be febrile with influenza,  ALONDRA, dehydration, afib with RVR, encephalopathy, aspiration pneumonia and septic shock.    Heparin drip started for afib but stopped due to hematochezia.   Plan:  - continue mechanical ventilation  - phenylephrine for septic shock  - empiric abx for possible aspiration  - rate/rhythm control with amiodarone  - restart heparin if no further bleeding  Total cc time 35 min .

## 2018-01-17 NOTE — PROGRESS NOTE ADULT - SUBJECTIVE AND OBJECTIVE BOX
SUBJECTIVE: patient is intubated and sedated.     VITAL SIGNS:  T(F): 98.5 (18 @ 08:00)  HR: 122 (18 @ 08:07)  BP: 110/85 (18 @ 08:00)  RR: 21 (18 @ 08:00)  SpO2: 100% (18 @ 08:07)  Wt(kg): --    PHYSICAL EXAM:    Constitutional: intubated sedated.   Eyes: pinpoint, sluggish   ENMT: no external lesions   Neck :supple, no JVD  Respiratory: distant breath sounds. clear   Cardiovascular:S1, s2 present   Gastrointestinal: soft, non tendner, non distended   Extremities: no cyanosis, edema or clubbing   Vascular: pulses intact   Neurological: sedated       MEDICATIONS  (STANDING):  ALBUTerol    90 MICROgram(s) HFA Inhaler 2 Puff(s) Inhalation every 4 hours  amiodarone Infusion 0.5 mG/Min (16.667 mL/Hr) IV Continuous <Continuous>  cefTRIAXone   IVPB 1 Gram(s) IV Intermittent every 24 hours  clindamycin IVPB      clindamycin IVPB 600 milliGRAM(s) IV Intermittent every 8 hours  ipratropium 17 MICROgram(s) HFA Inhaler 1 Puff(s) Inhalation every 4 hours  lactobacillus acidophilus 1 Tablet(s) Oral every 8 hours  oseltamivir 30 milliGRAM(s) Oral daily  pantoprazole  Injectable 40 milliGRAM(s) IV Push daily  phenylephrine    Infusion 0.5 MICROgram(s)/kG/Min (7.65 mL/Hr) IV Continuous <Continuous>  propofol Infusion 5 MICROgram(s)/kG/Min (2.448 mL/Hr) IV Continuous <Continuous>    MEDICATIONS  (PRN):      Allergies    No Known Allergies    Intolerances        LABS:                        13.9   21.4  )-----------( 200      ( 2018 04:07 )             45.3         149<H>  |  116<H>  |  73<H>  ----------------------------<  198<H>  4.3   |  20<L>  |  4.06<H>    Ca    8.8      2018 10:06  Phos  4.9       Mg     2.5         TPro  5.8<L>  /  Alb  2.2<L>  /  TBili  0.5  /  DBili  x   /  AST  291<H>  /  ALT  131<H>  /  AlkPhos  40      PT/INR - ( 2018 04:07 )   PT: 23.9 sec;   INR: 2.16 ratio        @ 07:01  -   @ 07:00  --------------------------------------------------------  IN: 761.7 mL / OUT: 165 mL / NET: 596.7 mL           PTT - ( 2018 04:07 )  PTT:37.7 sec  Urinalysis Basic - ( 2018 14:23 )    Color: Yellow / Appearance: Slightly Turbid / S.025 / pH: x  Gluc: x / Ketone: Trace  / Bili: Moderate / Urobili: Negative   Blood: x / Protein: 100 / Nitrite: Negative   Leuk Esterase: Trace / RBC: 25-50 /HPF / WBC 6-10 /HPF   Sq Epi: x / Non Sq Epi: Few /HPF / Bacteria: Many /HPF        RADIOLOGY & ADDITIONAL TESTS: SUBJECTIVE: patient is intubated and sedated.   U/O mildly better today  VITAL SIGNS:  T(F): 98.5 (18 @ 08:00)  HR: 122 (18 @ 08:07)  BP: 110/85 (18 @ 08:00)  RR: 21 (18 @ 08:00)  SpO2: 100% (18 @ 08:07)  Wt(kg): --    PHYSICAL EXAM:    Constitutional: intubated sedated.   Eyes: pinpoint, sluggish   ENMT: no external lesions   Neck :supple, no JVD  Respiratory: distant breath sounds. clear   Cardiovascular:S1, s2 present   Gastrointestinal: soft, non tendner, non distended   Extremities: no cyanosis, edema or clubbing   Vascular: pulses intact   Neurological: sedated       MEDICATIONS  (STANDING):  ALBUTerol    90 MICROgram(s) HFA Inhaler 2 Puff(s) Inhalation every 4 hours  amiodarone Infusion 0.5 mG/Min (16.667 mL/Hr) IV Continuous <Continuous>  cefTRIAXone   IVPB 1 Gram(s) IV Intermittent every 24 hours  clindamycin IVPB      clindamycin IVPB 600 milliGRAM(s) IV Intermittent every 8 hours  ipratropium 17 MICROgram(s) HFA Inhaler 1 Puff(s) Inhalation every 4 hours  lactobacillus acidophilus 1 Tablet(s) Oral every 8 hours  oseltamivir 30 milliGRAM(s) Oral daily  pantoprazole  Injectable 40 milliGRAM(s) IV Push daily  phenylephrine    Infusion 0.5 MICROgram(s)/kG/Min (7.65 mL/Hr) IV Continuous <Continuous>  propofol Infusion 5 MICROgram(s)/kG/Min (2.448 mL/Hr) IV Continuous <Continuous>    MEDICATIONS  (PRN):      Allergies    No Known Allergies    Intolerances        LABS:                        13.9   21.4  )-----------( 200      ( 2018 04:07 )             45.3     -    149<H>  |  116<H>  |  73<H>  ----------------------------<  198<H>  4.3   |  20<L>  |  4.06<H>    Ca    8.8      2018 10:06  Phos  4.9       Mg     2.5         TPro  5.8<L>  /  Alb  2.2<L>  /  TBili  0.5  /  DBili  x   /  AST  291<H>  /  ALT  131<H>  /  AlkPhos  40      PT/INR - ( 2018 04:07 )   PT: 23.9 sec;   INR: 2.16 ratio        @ 07:  -   @ 07:00  --------------------------------------------------------  IN: 761.7 mL / OUT: 165 mL / NET: 596.7 mL           PTT - ( 2018 04:07 )  PTT:37.7 sec  Urinalysis Basic - ( 2018 14:23 )    Color: Yellow / Appearance: Slightly Turbid / S.025 / pH: x  Gluc: x / Ketone: Trace  / Bili: Moderate / Urobili: Negative   Blood: x / Protein: 100 / Nitrite: Negative   Leuk Esterase: Trace / RBC: 25-50 /HPF / WBC 6-10 /HPF   Sq Epi: x / Non Sq Epi: Few /HPF / Bacteria: Many /HPF        RADIOLOGY & ADDITIONAL TESTS:

## 2018-01-18 NOTE — PROGRESS NOTE ADULT - ASSESSMENT
89 male from home, found on floor, +ALONDRA, dehydration, afib with RVR, encephalopathy was fluid resuscitated in ED, developed acute respiratory failure secondary to pulmonary edema intubated and admitted to ICU.     1. ALONDRA on CKD from dehydration and rhabdomyolysis; Cr trending down but increased again. UO is variable 4cc-->15-->50-->15cc/hr; Net +2934ml  - abdominal US showed no hydronephrosis. complex cysts both kidneys   - FeNA 1.1, most likrodriguez pre-renal ,less likrodriguez mild ATN, non oliguric  - Patient on half NS. Continue  gentle iv hydration if bp low with saline if Na level <145 or add 1/2 saline if Na level >145 at 100 ml per hr  - keep SBP>110  - Keep patient euvolemic and renal diet  - Avoid Nephrotoxic Meds/ Agents such as (NSAIDs, IV contrast, Aminoglycosides such as gentamicin, -Gadolinium contrast, Phosphate containing enemas, etc..)  - Adjust Medications according to eGFR  - f/u bmp q 6 hrs.  - elevated TG on propofol; monitor   - may need HD for fluid overload or hyperkalemia     2. Hypernatremia.   -r/o secondary to saline infusion, less likely hypovolemic  -f/u Na level q 12hrs.     3. Hyperkalemia.   - resolved   -secondary to alondra  -s/p calcium/insulin/dextrose given  -keep k >4 and <5  -may need hd if k is very high.    4. elevated P: added Renagel     5. Systolic heart failure: care per ICU   6. Influenza: care per ICU team   7. DVT, GI ppx 89 male from home, found on floor, +ALONDRA, dehydration, afib with RVR, encephalopathy was fluid resuscitated in ED, developed acute respiratory failure secondary to pulmonary edema intubated and admitted to ICU.     1. Hepato-renal syndrome: patient with liver cirrhosis and urine Na<5.  UO is variable 4cc-->15-->50-->15cc/hr; Net +2934ml  - abdominal US showed no hydronephrosis. Complex cysts both kidneys   - recommend midodrine 5Tid + octreotide 100 tid   - start albumin infusion 1g/kg today  then 20g daily   - keep SBP>110  - Keep patient euvolemic and renal diet  - Avoid Nephrotoxic Meds/ Agents such as (NSAIDs, IV contrast, Aminoglycosides such as gentamicin, -Gadolinium contrast, Phosphate containing enemas, etc..)  - Adjust Medications according to eGFR  - f/u bmp q 6 hrs. repeat Urine na.   - elevated TG on propofol; monitor   - may need HD for fluid overload or hyperkalemia   - please consult GI     2. Hypernatremia.   - resolved  -r/o secondary to saline infusion, less likely hypovolemic  -f/u Na level q 12hrs.     3. Hyperkalemia.   - resolved   -secondary to alondra  -s/p calcium/insulin/dextrose given  -keep k >4 and <5  -may need hd if k is very high.    4. Hyperphosphatemia: sec to ALONDRA   increased dose of renagel    5. Systolic heart failure: care per ICU   6. Influenza: care per ICU team   7. DVT, GI ppx

## 2018-01-18 NOTE — PROGRESS NOTE ADULT - ATTENDING COMMENTS
89 male with unknown PMH was found down in his apartment, brought to the ED and found to be febrile with influenza,  ALONDRA, dehydration, afib with RVR, encephalopathy, aspiration pneumonia and septic shock.  Also found to have CHFrEF/cardiomyopathy.  Heparin drip started for afib but stopped due to hematochezia.   Plan:  - continue mechanical ventilation  - phenylephrine being weaned off.  - empiric abx for possible aspiration  - rate/rhythm control with amiodarone  - restart heparin if no further bleeding  Total cc time 35 min .

## 2018-01-18 NOTE — DIETITIAN INITIAL EVALUATION ADULT. - OTHER INFO
Pt seen. TF not in progress. Propofol 7.3 ml/hr infusing. Discussed with PGY 1 and TF recommendation for Nepro given verbally.

## 2018-01-18 NOTE — CONSULT NOTE ADULT - ASSESSMENT
89M w/influenza, renal failure, and possible EtOH cirrhosis.  -if pt is cirrhotic he is in the early stages given normal-sized spleen (and therefore normal Plt) along with normal bilirubin  -if pt was heavily drinking EtOH he may currently have decompensated cirrhosis (as evidenced by elevated LFTs and INR) vs. acute EtOH hepatitis (although a normal bilirubin argues against this)  -given above, it is conceivable that this pt may have hepatorenal syndrome, although it is unusual to develop HRS without ascites  -as other causes of renal failure seem to have been excluded and his most recent urine studies raise the possibility of HRS, it is reasonable to try and treat the pt for HRS to see if his renal function improves: pt is already on an alpha agonist (Perry-Synephrin), would start Octreotide 100 mcg SQ TID, would start Albumin 25 gm q8h x48 hrs  -would avoid hepatotoxic medications given elevated LFTs  -as pt's Hb is currently stable it is possible that pt's hematochezia was due to a hemorrhoidal bleed in setting of heparin gtt; would check CBC q12h and if it remains stable can restart AC for AFib and continue to trend Hb q8-12h; if pt rebleeds with significant drop in Hb consider flex sig for further evaluation

## 2018-01-18 NOTE — PROGRESS NOTE ADULT - SUBJECTIVE AND OBJECTIVE BOX
SUBJECTIVE: Patient intubated/sedated. UO improved.     VITAL SIGNS:  Vital Signs Last 24 Hrs  T(C): 36.4 (18 Jan 2018 05:00), Max: 37.1 (17 Jan 2018 12:30)  T(F): 97.5 (18 Jan 2018 05:00), Max: 98.7 (17 Jan 2018 12:30)  HR: 124 (18 Jan 2018 08:13) (12 - 146)  BP: 114/79 (18 Jan 2018 07:00) (78/56 - 119/86)  BP(mean): 85 (18 Jan 2018 07:00) (43 - 93)  RR: 25 (18 Jan 2018 07:00) (20 - 32)  SpO2: 100% (18 Jan 2018 08:13) (99% - 100%)      01-17 @ 07:01  -  01-18 @ 07:00  --------------------------------------------------------  IN: 2454.9 mL / OUT: 370 mL / NET: 2084.9 mL        PHYSICAL EXAM:    Constitutional: intubated sedated.   Eyes: pinpoint, sluggish   ENMT: no external lesions   Neck :supple, no JVD  Respiratory: distant breath sounds. clear   Cardiovascular:S1, s2 present   Gastrointestinal: soft, non tender non distended   Extremities: no cyanosis, edema or clubbing   Vascular: pulses intact   Neurological: sedated       MEDICATIONS  (STANDING):  ALBUTerol    90 MICROgram(s) HFA Inhaler 2 Puff(s) Inhalation every 4 hours  amiodarone    Tablet 400 milliGRAM(s) Oral two times a day  azithromycin  IVPB 500 milliGRAM(s) IV Intermittent every 24 hours  clindamycin IVPB      clindamycin IVPB 600 milliGRAM(s) IV Intermittent every 8 hours  ipratropium 17 MICROgram(s) HFA Inhaler 1 Puff(s) Inhalation every 4 hours  lactobacillus acidophilus 1 Tablet(s) Oral every 8 hours  metoprolol     tartrate 12.5 milliGRAM(s) Oral two times a day  oseltamivir 30 milliGRAM(s) Oral daily  pantoprazole  Injectable 40 milliGRAM(s) IV Push daily  phenylephrine    Infusion 0.5 MICROgram(s)/kG/Min (7.65 mL/Hr) IV Continuous <Continuous>  propofol Infusion 5 MICROgram(s)/kG/Min (2.448 mL/Hr) IV Continuous <Continuous>  sevelamer hydrochloride 800 milliGRAM(s) Oral three times a day with meals  sodium chloride 0.45%. 1000 milliLiter(s) (50 mL/Hr) IV Continuous <Continuous>    MEDICATIONS  (PRN):      Allergies    No Known Allergies    Intolerances        LABS:                             13.3   18.3  )-----------( 194      ( 18 Jan 2018 03:56 )             42.1   01-18    144  |  113<H>  |  80<H>  ----------------------------<  191<H>  4.4   |  19<L>  |  4.56<H>    Ca    8.4      18 Jan 2018 03:56  Phos  6.0     01-18  Mg     2.6     01-18    TPro  6.0  /  Alb  1.9<L>  /  TBili  0.5  /  DBili  x   /  AST  332<H>  /  ALT  212<H>  /  AlkPhos  47  01-18             RADIOLOGY & ADDITIONAL TESTS: CXR improved; pending read

## 2018-01-18 NOTE — PROGRESS NOTE ADULT - SUBJECTIVE AND OBJECTIVE BOX
[ ]DNR    [ ]DNI    [ ]MEWS SUSPENDED     [ ]GOALS OF CARE DISCUSSION WITH PATIENT/FAMILY/PROXY:    INTERVAL HPI/OVERNIGHT EVENTS:  no overnight events     PRESSORS: [ x] YES [ ] NO  WHICH: pheny     ANTIBIOTICS:           rocephin        DATE STARTED:   ANTIBIOTICS:         clindamycin           DATE STARTED:   ANTIBIOTICS:                  DATE STARTED:    ALBUTerol    90 MICROgram(s) HFA Inhaler 2 Puff(s) Inhalation every 4 hours  amiodarone    Tablet 400 milliGRAM(s) Oral two times a day  azithromycin  IVPB 500 milliGRAM(s) IV Intermittent every 24 hours  clindamycin IVPB      clindamycin IVPB 600 milliGRAM(s) IV Intermittent every 8 hours  ipratropium 17 MICROgram(s) HFA Inhaler 1 Puff(s) Inhalation every 4 hours  lactobacillus acidophilus 1 Tablet(s) Oral every 8 hours  metoprolol     tartrate 12.5 milliGRAM(s) Oral two times a day  oseltamivir 30 milliGRAM(s) Oral daily  pantoprazole  Injectable 40 milliGRAM(s) IV Push daily  phenylephrine    Infusion 0.5 MICROgram(s)/kG/Min IV Continuous <Continuous>  propofol Infusion 5 MICROgram(s)/kG/Min IV Continuous <Continuous>  sevelamer hydrochloride 800 milliGRAM(s) Oral three times a day with meals  sodium chloride 0.45%. 1000 milliLiter(s) IV Continuous <Continuous>      CENTRAL LINE: [ ] YES [ ] NO  LOCATION:   DATE INSERTED:  REMOVE: [ ] YES [ ] NO  EXPLAIN:    STEPHANIA: [ ] YES [ ] NO    DATE INSERTED:  REMOVE:  [ ] YES [ ] NO  EXPLAIN:    PMH -reviewed admission note, no change since admission  PAST MEDICAL & SURGICAL HISTORY:      T(C): 36.4 (18 @ 05:00), Max: 37.1 (18 @ 12:30)  HR: 124 (18 @ 08:13)  BP: 114/79 (18 @ 07:00)  RR: 25 (18 @ 07:00)  SpO2: 100% (18 @ 08:13)  Wt(kg): --       @ 07:01  -   @ 07:00  --------------------------------------------------------  IN: 2454.9 mL / OUT: 370 mL / NET: 2084.9 mL        PHYSICAL EXAM:    GENERAL: NAD  HEENT: dry mucosa, PERRLA  NECK: supple neck, no JVD  CVS: tachycardic, no RMG  CHEST/LUNG:  CTAx2, no wheezing, no rales, no rhonchi  ABDOMEN:  soft, non tender. no distention, no rigidity, decrease BS   EXTREMITIES:   no edema, no cyanosis   SKIN:  no rashes, no blisters  Neuro: sedated           LABS:  CBC Full  -  ( 2018 03:56 )  WBC Count : 18.3 K/uL  Hemoglobin : 13.3 g/dL  Hematocrit : 42.1 %  Platelet Count - Automated : 194 K/uL  Mean Cell Volume : 99.6 fl  Mean Cell Hemoglobin : 31.4 pg  Mean Cell Hemoglobin Concentration : 31.6 gm/dL  Auto Neutrophil # : 16.4 K/uL  Auto Lymphocyte # : 1.1 K/uL  Auto Monocyte # : 0.6 K/uL  Auto Eosinophil # : 0.0 K/uL  Auto Basophil # : 0.1 K/uL  Auto Neutrophil % : 89.9 %  Auto Lymphocyte % : 6.0 %  Auto Monocyte % : 3.3 %  Auto Eosinophil % : 0.1 %  Auto Basophil % : 0.7 %        144  |  113<H>  |  80<H>  ----------------------------<  191<H>  4.4   |  19<L>  |  4.56<H>    Ca    8.4      2018 03:56  Phos  6.0       Mg     2.6     18    TPro  6.0  /  Alb  1.9<L>  /  TBili  0.5  /  DBili  x   /  AST  332<H>  /  ALT  212<H>  /  AlkPhos  47  -18    PT/INR - ( 2018 03:56 )   PT: 17.8 sec;   INR: 1.62 ratio         PTT - ( 2018 03:56 )  PTT:26.8 sec  Urinalysis Basic - ( 2018 14:23 )    Color: Yellow / Appearance: Slightly Turbid / S.025 / pH: x  Gluc: x / Ketone: Trace  / Bili: Moderate / Urobili: Negative   Blood: x / Protein: 100 / Nitrite: Negative   Leuk Esterase: Trace / RBC: 25-50 /HPF / WBC 6-10 /HPF   Sq Epi: x / Non Sq Epi: Few /HPF / Bacteria: Many /HPF      Culture Results:   No growth to date. ( @ 00:56)  Culture Results:   No growth to date. ( @ 17:49)  Culture Results:   No growth to date. ( @ 17:49)      RADIOLOGY & ADDITIONAL STUDIES REVIEWED:  ***    89 M with no known PMH is BIBEMS after being found on floor in respiratory distress after 2 days, and with significant metabolic acidosis from ARF with oliguria from dehydration,  lactic acidosis and fevers. Pt admitted to MICU for Acute Hypoxic  Respiratory Failure and shock in setting of likely sepsis vs hypovolemia      Problem/Plan - 1:    Shock.     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   c/w MV  f/u prolactin to deescalate abx   droplet precaution        Problem/Plan - 4:     ARF    - most likely 2/2  ATN 2/2 dehydration   - pt presented with hemoconcentration and Cr of 4  - Cr worsening    - gentle hydration    low UO: 50, 10, 15          Problem/Plan - 5:     Metabolic acidosis.    improving   most likely 2/2 dehydration and ARF   lactate of 14 upon admission   s/p 4L of IVF  Lactate WNL   monitor UO  Dr Moya for nephrology.          Problem/Plan - 5:    ·Problem: Afib.      Plan: unknown if new or old  pt completed amio loading, started on PO amiodarone 400 BID  heparin on hold since pt is having BRBPR  will initiate heparin gtt after negative head CT   CE trended down, most likely demand   f/u echo       Problem/Plan - 6:    Prophylactic measure.  Plan: continue Protonix and dvt px with hsq.       CRITICAL CARE TIME SPENT: 35 minutes [ ]DNR    [ ]DNI    [ ]MEWS SUSPENDED     [ ]GOALS OF CARE DISCUSSION WITH PATIENT/FAMILY/PROXY:    INTERVAL HPI/OVERNIGHT EVENTS:  no overnight events     PRESSORS: [ x] YES [ ] NO  WHICH: pheny     ANTIBIOTICS:           rocephin        DATE STARTED:   ANTIBIOTICS:         clindamycin           DATE STARTED:   ANTIBIOTICS:                  DATE STARTED:    ALBUTerol    90 MICROgram(s) HFA Inhaler 2 Puff(s) Inhalation every 4 hours  amiodarone    Tablet 400 milliGRAM(s) Oral two times a day  azithromycin  IVPB 500 milliGRAM(s) IV Intermittent every 24 hours  clindamycin IVPB      clindamycin IVPB 600 milliGRAM(s) IV Intermittent every 8 hours  ipratropium 17 MICROgram(s) HFA Inhaler 1 Puff(s) Inhalation every 4 hours  lactobacillus acidophilus 1 Tablet(s) Oral every 8 hours  metoprolol     tartrate 12.5 milliGRAM(s) Oral two times a day  oseltamivir 30 milliGRAM(s) Oral daily  pantoprazole  Injectable 40 milliGRAM(s) IV Push daily  phenylephrine    Infusion 0.5 MICROgram(s)/kG/Min IV Continuous <Continuous>  propofol Infusion 5 MICROgram(s)/kG/Min IV Continuous <Continuous>  sevelamer hydrochloride 800 milliGRAM(s) Oral three times a day with meals  sodium chloride 0.45%. 1000 milliLiter(s) IV Continuous <Continuous>      CENTRAL LINE: [ ] YES [ ] NO  LOCATION:   DATE INSERTED:  REMOVE: [ ] YES [ ] NO  EXPLAIN:    STEPHANIA: [ ] YES [ ] NO    DATE INSERTED:  REMOVE:  [ ] YES [ ] NO  EXPLAIN:    PMH -reviewed admission note, no change since admission  PAST MEDICAL & SURGICAL HISTORY:      T(C): 36.4 (18 @ 05:00), Max: 37.1 (18 @ 12:30)  HR: 124 (18 @ 08:13)  BP: 114/79 (18 @ 07:00)  RR: 25 (18 @ 07:00)  SpO2: 100% (18 @ 08:13)  Wt(kg): --       @ 07:01  -   @ 07:00  --------------------------------------------------------  IN: 2454.9 mL / OUT: 370 mL / NET: 2084.9 mL        PHYSICAL EXAM:    GENERAL: NAD  HEENT: dry mucosa, PERRLA  NECK: supple neck, no JVD  CVS: tachycardic, no RMG  CHEST/LUNG:  CTAx2, no wheezing, no rales, no rhonchi  ABDOMEN:  soft, non tender. no distention, no rigidity, decrease BS   EXTREMITIES:   no edema, no cyanosis   SKIN:  no rashes, no blisters  Neuro: sedated           LABS:  CBC Full  -  ( 2018 03:56 )  WBC Count : 18.3 K/uL  Hemoglobin : 13.3 g/dL  Hematocrit : 42.1 %  Platelet Count - Automated : 194 K/uL  Mean Cell Volume : 99.6 fl  Mean Cell Hemoglobin : 31.4 pg  Mean Cell Hemoglobin Concentration : 31.6 gm/dL  Auto Neutrophil # : 16.4 K/uL  Auto Lymphocyte # : 1.1 K/uL  Auto Monocyte # : 0.6 K/uL  Auto Eosinophil # : 0.0 K/uL  Auto Basophil # : 0.1 K/uL  Auto Neutrophil % : 89.9 %  Auto Lymphocyte % : 6.0 %  Auto Monocyte % : 3.3 %  Auto Eosinophil % : 0.1 %  Auto Basophil % : 0.7 %        144  |  113<H>  |  80<H>  ----------------------------<  191<H>  4.4   |  19<L>  |  4.56<H>    Ca    8.4      2018 03:56  Phos  6.0       Mg     2.6     18    TPro  6.0  /  Alb  1.9<L>  /  TBili  0.5  /  DBili  x   /  AST  332<H>  /  ALT  212<H>  /  AlkPhos  47  -18    PT/INR - ( 2018 03:56 )   PT: 17.8 sec;   INR: 1.62 ratio         PTT - ( 2018 03:56 )  PTT:26.8 sec  Urinalysis Basic - ( 2018 14:23 )    Color: Yellow / Appearance: Slightly Turbid / S.025 / pH: x  Gluc: x / Ketone: Trace  / Bili: Moderate / Urobili: Negative   Blood: x / Protein: 100 / Nitrite: Negative   Leuk Esterase: Trace / RBC: 25-50 /HPF / WBC 6-10 /HPF   Sq Epi: x / Non Sq Epi: Few /HPF / Bacteria: Many /HPF      Culture Results:   No growth to date. ( @ 00:56)  Culture Results:   No growth to date. ( @ 17:49)  Culture Results:   No growth to date. ( @ 17:49)      RADIOLOGY & ADDITIONAL STUDIES REVIEWED:  ***    89 M with no known PMH is BIBEMS after being found on floor in respiratory distress after 2 days, and with significant metabolic acidosis from ARF with oliguria from dehydration,  lactic acidosis and fevers. Pt admitted to MICU for Acute Hypoxic  Respiratory Failure and shock in setting of likely sepsis vs hypovolemia      Problem/Plan - 1:    Shock.     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   c/w MV  f/u prolactin to deescalate abx   droplet precaution        Problem/Plan - 4:     ARF    - most likely 2/2  ATN 2/2 dehydration   - pt presented with hemoconcentration and Cr of 4  - Cr worsening    - gentle hydration    low UO: 50, 10, 15          Problem/Plan - 5:     Metabolic acidosis.    improving   most likely 2/2 dehydration and ARF   lactate of 14 upon admission   s/p 4L of IVF  Lactate WNL   monitor UO  Dr Moya for nephrology.          Problem/Plan - 5:    ·Problem: Afib.      Plan: unknown if new or old  pt completed amio loading, started on PO amiodarone for maintenance will change to other class when BP is more stable   heparin on hold since pt is having BRBPR  will initiate heparin gtt after negative head CT   CE trended down, most likely demand   f/u echo       Problem/Plan - 6:    Prophylactic measure.  Plan: continue Protonix and dvt px with hsq.       CRITICAL CARE TIME SPENT: 35 minutes [ ]DNR    [ ]DNI    [ ]MEWS SUSPENDED     [ ]GOALS OF CARE DISCUSSION WITH PATIENT/FAMILY/PROXY:    INTERVAL HPI/OVERNIGHT EVENTS:  no overnight events     PRESSORS: [ x] YES [ ] NO  WHICH: pheny     ANTIBIOTICS:           rocephin        DATE STARTED:   ANTIBIOTICS:         clindamycin           DATE STARTED:   ANTIBIOTICS:                  DATE STARTED:    ALBUTerol    90 MICROgram(s) HFA Inhaler 2 Puff(s) Inhalation every 4 hours  amiodarone    Tablet 400 milliGRAM(s) Oral two times a day  azithromycin  IVPB 500 milliGRAM(s) IV Intermittent every 24 hours  clindamycin IVPB      clindamycin IVPB 600 milliGRAM(s) IV Intermittent every 8 hours  ipratropium 17 MICROgram(s) HFA Inhaler 1 Puff(s) Inhalation every 4 hours  lactobacillus acidophilus 1 Tablet(s) Oral every 8 hours  metoprolol     tartrate 12.5 milliGRAM(s) Oral two times a day  oseltamivir 30 milliGRAM(s) Oral daily  pantoprazole  Injectable 40 milliGRAM(s) IV Push daily  phenylephrine    Infusion 0.5 MICROgram(s)/kG/Min IV Continuous <Continuous>  propofol Infusion 5 MICROgram(s)/kG/Min IV Continuous <Continuous>  sevelamer hydrochloride 800 milliGRAM(s) Oral three times a day with meals  sodium chloride 0.45%. 1000 milliLiter(s) IV Continuous <Continuous>      CENTRAL LINE: [ ] YES [ ] NO  LOCATION:   DATE INSERTED:  REMOVE: [ ] YES [ ] NO  EXPLAIN:    STEPHANIA: [ ] YES [ ] NO    DATE INSERTED:  REMOVE:  [ ] YES [ ] NO  EXPLAIN:    PMH -reviewed admission note, no change since admission  PAST MEDICAL & SURGICAL HISTORY:      T(C): 36.4 (18 @ 05:00), Max: 37.1 (18 @ 12:30)  HR: 124 (18 @ 08:13)  BP: 114/79 (18 @ 07:00)  RR: 25 (18 @ 07:00)  SpO2: 100% (18 @ 08:13)  Wt(kg): --       @ 07:01  -   @ 07:00  --------------------------------------------------------  IN: 2454.9 mL / OUT: 370 mL / NET: 2084.9 mL        PHYSICAL EXAM:    GENERAL: NAD  HEENT: dry mucosa, PERRLA  NECK: supple neck, no JVD  CVS: tachycardic, no RMG  CHEST/LUNG:  CTAx2, no wheezing, no rales, no rhonchi  ABDOMEN:  soft, non tender. no distention, no rigidity, decrease BS   EXTREMITIES:   no edema, no cyanosis   SKIN:  no rashes, no blisters  Neuro: sedated           LABS:  CBC Full  -  ( 2018 03:56 )  WBC Count : 18.3 K/uL  Hemoglobin : 13.3 g/dL  Hematocrit : 42.1 %  Platelet Count - Automated : 194 K/uL  Mean Cell Volume : 99.6 fl  Mean Cell Hemoglobin : 31.4 pg  Mean Cell Hemoglobin Concentration : 31.6 gm/dL  Auto Neutrophil # : 16.4 K/uL  Auto Lymphocyte # : 1.1 K/uL  Auto Monocyte # : 0.6 K/uL  Auto Eosinophil # : 0.0 K/uL  Auto Basophil # : 0.1 K/uL  Auto Neutrophil % : 89.9 %  Auto Lymphocyte % : 6.0 %  Auto Monocyte % : 3.3 %  Auto Eosinophil % : 0.1 %  Auto Basophil % : 0.7 %        144  |  113<H>  |  80<H>  ----------------------------<  191<H>  4.4   |  19<L>  |  4.56<H>    Ca    8.4      2018 03:56  Phos  6.0       Mg     2.6     18    TPro  6.0  /  Alb  1.9<L>  /  TBili  0.5  /  DBili  x   /  AST  332<H>  /  ALT  212<H>  /  AlkPhos  47  -18    PT/INR - ( 2018 03:56 )   PT: 17.8 sec;   INR: 1.62 ratio         PTT - ( 2018 03:56 )  PTT:26.8 sec  Urinalysis Basic - ( 2018 14:23 )    Color: Yellow / Appearance: Slightly Turbid / S.025 / pH: x  Gluc: x / Ketone: Trace  / Bili: Moderate / Urobili: Negative   Blood: x / Protein: 100 / Nitrite: Negative   Leuk Esterase: Trace / RBC: 25-50 /HPF / WBC 6-10 /HPF   Sq Epi: x / Non Sq Epi: Few /HPF / Bacteria: Many /HPF      Culture Results:   No growth to date. ( @ 00:56)  Culture Results:   No growth to date. ( @ 17:49)  Culture Results:   No growth to date. ( @ 17:49)      RADIOLOGY & ADDITIONAL STUDIES REVIEWED:  ***    89 M with no known PMH is BIBEMS after being found on floor in respiratory distress after 2 days, and with significant metabolic acidosis from ARF with oliguria from dehydration,  lactic acidosis and fevers. Pt admitted to MICU for Acute Hypoxic  Respiratory Failure and shock in setting of likely sepsis vs hypovolemia      Problem/Plan - 1:    Shock.     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   c/w MV  f/u prolactin to deescalate abx   droplet precaution        Problem/Plan - 4:     ARF    - most likely 2/2  ATN 2/2 dehydration   - pt presented with hemoconcentration and Cr of 4  - Cr worsening    - gentle hydration , pt might benefit of Lasix trial   low UO: 50, 10, 15          Problem/Plan - 5:     Metabolic acidosis.    improving   most likely 2/2 dehydration and ARF   lactate of 14 upon admission   s/p 4L of IVF  Lactate WNL   monitor UO  Dr Moya for nephrology.          Problem/Plan - 5:    ·Problem: Afib.      Plan: unknown if new or old  pt completed amio loading, started on PO amiodarone for maintenance will change to other class when BP is more stable   heparin on hold since pt is having BRBPR  will initiate heparin gtt after negative head CT   CE trended down, most likely demand   f/u echo       Problem/Plan - 6:    Prophylactic measure.  Plan: continue Protonix and dvt px with hsq.       CRITICAL CARE TIME SPENT: 35 minutes [ ]DNR    [ ]DNI    [ ]MEWS SUSPENDED     [ ]GOALS OF CARE DISCUSSION WITH PATIENT/FAMILY/PROXY:    INTERVAL HPI/OVERNIGHT EVENTS:  no overnight events     PRESSORS: [ x] YES [ ] NO  WHICH: pheny     ANTIBIOTICS:           rocephin        DATE STARTED:   ANTIBIOTICS:         clindamycin           DATE STARTED:   ANTIBIOTICS:                  DATE STARTED:    ALBUTerol    90 MICROgram(s) HFA Inhaler 2 Puff(s) Inhalation every 4 hours  amiodarone    Tablet 400 milliGRAM(s) Oral two times a day  azithromycin  IVPB 500 milliGRAM(s) IV Intermittent every 24 hours  clindamycin IVPB      clindamycin IVPB 600 milliGRAM(s) IV Intermittent every 8 hours  ipratropium 17 MICROgram(s) HFA Inhaler 1 Puff(s) Inhalation every 4 hours  lactobacillus acidophilus 1 Tablet(s) Oral every 8 hours  metoprolol     tartrate 12.5 milliGRAM(s) Oral two times a day  oseltamivir 30 milliGRAM(s) Oral daily  pantoprazole  Injectable 40 milliGRAM(s) IV Push daily  phenylephrine    Infusion 0.5 MICROgram(s)/kG/Min IV Continuous <Continuous>  propofol Infusion 5 MICROgram(s)/kG/Min IV Continuous <Continuous>  sevelamer hydrochloride 800 milliGRAM(s) Oral three times a day with meals  sodium chloride 0.45%. 1000 milliLiter(s) IV Continuous <Continuous>      CENTRAL LINE: [ ] YES [ ] NO  LOCATION:   DATE INSERTED:  REMOVE: [ ] YES [ ] NO  EXPLAIN:    STEPHANIA: [ ] YES [ ] NO    DATE INSERTED:  REMOVE:  [ ] YES [ ] NO  EXPLAIN:    PMH -reviewed admission note, no change since admission  PAST MEDICAL & SURGICAL HISTORY:      T(C): 36.4 (18 @ 05:00), Max: 37.1 (18 @ 12:30)  HR: 124 (18 @ 08:13)  BP: 114/79 (18 @ 07:00)  RR: 25 (18 @ 07:00)  SpO2: 100% (18 @ 08:13)  Wt(kg): --       @ 07:01  -   @ 07:00  --------------------------------------------------------  IN: 2454.9 mL / OUT: 370 mL / NET: 2084.9 mL        PHYSICAL EXAM:    GENERAL: NAD  HEENT: dry mucosa, PERRLA  NECK: supple neck, no JVD  CVS: tachycardic, no RMG  CHEST/LUNG:  CTAx2, no wheezing, no rales, no rhonchi  ABDOMEN:  soft, non tender. no distention, no rigidity, decrease BS   EXTREMITIES:   no edema, no cyanosis   SKIN:  no rashes, no blisters  Neuro: sedated           LABS:  CBC Full  -  ( 2018 03:56 )  WBC Count : 18.3 K/uL  Hemoglobin : 13.3 g/dL  Hematocrit : 42.1 %  Platelet Count - Automated : 194 K/uL  Mean Cell Volume : 99.6 fl  Mean Cell Hemoglobin : 31.4 pg  Mean Cell Hemoglobin Concentration : 31.6 gm/dL  Auto Neutrophil # : 16.4 K/uL  Auto Lymphocyte # : 1.1 K/uL  Auto Monocyte # : 0.6 K/uL  Auto Eosinophil # : 0.0 K/uL  Auto Basophil # : 0.1 K/uL  Auto Neutrophil % : 89.9 %  Auto Lymphocyte % : 6.0 %  Auto Monocyte % : 3.3 %  Auto Eosinophil % : 0.1 %  Auto Basophil % : 0.7 %        144  |  113<H>  |  80<H>  ----------------------------<  191<H>  4.4   |  19<L>  |  4.56<H>    Ca    8.4      2018 03:56  Phos  6.0       Mg     2.6     18    TPro  6.0  /  Alb  1.9<L>  /  TBili  0.5  /  DBili  x   /  AST  332<H>  /  ALT  212<H>  /  AlkPhos  47  -18    PT/INR - ( 2018 03:56 )   PT: 17.8 sec;   INR: 1.62 ratio         PTT - ( 2018 03:56 )  PTT:26.8 sec  Urinalysis Basic - ( 2018 14:23 )    Color: Yellow / Appearance: Slightly Turbid / S.025 / pH: x  Gluc: x / Ketone: Trace  / Bili: Moderate / Urobili: Negative   Blood: x / Protein: 100 / Nitrite: Negative   Leuk Esterase: Trace / RBC: 25-50 /HPF / WBC 6-10 /HPF   Sq Epi: x / Non Sq Epi: Few /HPF / Bacteria: Many /HPF      Culture Results:   No growth to date. ( @ 00:56)  Culture Results:   No growth to date. ( @ 17:49)  Culture Results:   No growth to date. ( @ 17:49)      RADIOLOGY & ADDITIONAL STUDIES REVIEWED:  ***    89 M with no known PMH is BIBEMS after being found on floor in respiratory distress after 2 days, and with significant metabolic acidosis from ARF with oliguria from dehydration,  lactic acidosis and fevers. Pt admitted to MICU for Acute Hypoxic  Respiratory Failure and shock in setting of likely sepsis vs hypovolemia      Problem/Plan - 1:    Shock.     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   c/w MV  f/u prolactin to deescalate abx   droplet precaution        Problem/Plan - 4:     ARF    - most likely 2/2  ATN 2/2 dehydration   - pt presented with hemoconcentration and Cr of 4  - Cr worsening    - gentle hydration , pt might benefit of Lasix trial   low UO: 50, 10, 15          Problem/Plan - 5:     Metabolic acidosis.    improving   most likely 2/2 dehydration and ARF   lactate of 14 upon admission   s/p 4L of IVF  Lactate WNL   monitor UO  Dr Moya for nephrology.          Problem/Plan - 5:    ·Problem: Afib.      Plan: unknown if new or old  rate not controlled   pt completed amio loading, started on PO amiodarone for maintenance will change to other class when BP is more stable   heparin on hold since pt is having BRBPR  will initiate heparin gtt after negative head CT   CE trended down, most likely demand   f/u echo       Problem/Plan - 6:    Prophylactic measure.  Plan: continue Protonix and dvt px with hsq.       CRITICAL CARE TIME SPENT: 35 minutes [ ]DNR    [ ]DNI    [ ]MEWS SUSPENDED     [ ]GOALS OF CARE DISCUSSION WITH PATIENT/FAMILY/PROXY:    INTERVAL HPI/OVERNIGHT EVENTS:  no overnight events     PRESSORS: [ x] YES [ ] NO  WHICH: pheny     ANTIBIOTICS:           rocephin        DATE STARTED:   ANTIBIOTICS:         clindamycin           DATE STARTED:   ANTIBIOTICS:                  DATE STARTED:    ALBUTerol    90 MICROgram(s) HFA Inhaler 2 Puff(s) Inhalation every 4 hours  amiodarone    Tablet 400 milliGRAM(s) Oral two times a day  azithromycin  IVPB 500 milliGRAM(s) IV Intermittent every 24 hours  clindamycin IVPB      clindamycin IVPB 600 milliGRAM(s) IV Intermittent every 8 hours  ipratropium 17 MICROgram(s) HFA Inhaler 1 Puff(s) Inhalation every 4 hours  lactobacillus acidophilus 1 Tablet(s) Oral every 8 hours  metoprolol     tartrate 12.5 milliGRAM(s) Oral two times a day  oseltamivir 30 milliGRAM(s) Oral daily  pantoprazole  Injectable 40 milliGRAM(s) IV Push daily  phenylephrine    Infusion 0.5 MICROgram(s)/kG/Min IV Continuous <Continuous>  propofol Infusion 5 MICROgram(s)/kG/Min IV Continuous <Continuous>  sevelamer hydrochloride 800 milliGRAM(s) Oral three times a day with meals  sodium chloride 0.45%. 1000 milliLiter(s) IV Continuous <Continuous>      CENTRAL LINE: [ ] YES [ ] NO  LOCATION:   DATE INSERTED:  REMOVE: [ ] YES [ ] NO  EXPLAIN:    STEPHANIA: [ ] YES [ ] NO    DATE INSERTED:  REMOVE:  [ ] YES [ ] NO  EXPLAIN:    PMH -reviewed admission note, no change since admission  PAST MEDICAL & SURGICAL HISTORY:      T(C): 36.4 (18 @ 05:00), Max: 37.1 (18 @ 12:30)  HR: 124 (18 @ 08:13)  BP: 114/79 (18 @ 07:00)  RR: 25 (18 @ 07:00)  SpO2: 100% (18 @ 08:13)  Wt(kg): --       @ 07:01  -   @ 07:00  --------------------------------------------------------  IN: 2454.9 mL / OUT: 370 mL / NET: 2084.9 mL        PHYSICAL EXAM:    GENERAL: NAD  HEENT: dry mucosa, PERRLA  NECK: supple neck, no JVD  CVS: tachycardic, no RMG  CHEST/LUNG:  CTAx2, no wheezing, no rales, no rhonchi  ABDOMEN:  soft, non tender. no distention, no rigidity, decrease BS   EXTREMITIES:   no edema, no cyanosis   SKIN:  no rashes, no blisters  Neuro: sedated           LABS:  CBC Full  -  ( 2018 03:56 )  WBC Count : 18.3 K/uL  Hemoglobin : 13.3 g/dL  Hematocrit : 42.1 %  Platelet Count - Automated : 194 K/uL  Mean Cell Volume : 99.6 fl  Mean Cell Hemoglobin : 31.4 pg  Mean Cell Hemoglobin Concentration : 31.6 gm/dL  Auto Neutrophil # : 16.4 K/uL  Auto Lymphocyte # : 1.1 K/uL  Auto Monocyte # : 0.6 K/uL  Auto Eosinophil # : 0.0 K/uL  Auto Basophil # : 0.1 K/uL  Auto Neutrophil % : 89.9 %  Auto Lymphocyte % : 6.0 %  Auto Monocyte % : 3.3 %  Auto Eosinophil % : 0.1 %  Auto Basophil % : 0.7 %        144  |  113<H>  |  80<H>  ----------------------------<  191<H>  4.4   |  19<L>  |  4.56<H>    Ca    8.4      2018 03:56  Phos  6.0       Mg     2.6     18    TPro  6.0  /  Alb  1.9<L>  /  TBili  0.5  /  DBili  x   /  AST  332<H>  /  ALT  212<H>  /  AlkPhos  47  -18    PT/INR - ( 2018 03:56 )   PT: 17.8 sec;   INR: 1.62 ratio         PTT - ( 2018 03:56 )  PTT:26.8 sec  Urinalysis Basic - ( 2018 14:23 )    Color: Yellow / Appearance: Slightly Turbid / S.025 / pH: x  Gluc: x / Ketone: Trace  / Bili: Moderate / Urobili: Negative   Blood: x / Protein: 100 / Nitrite: Negative   Leuk Esterase: Trace / RBC: 25-50 /HPF / WBC 6-10 /HPF   Sq Epi: x / Non Sq Epi: Few /HPF / Bacteria: Many /HPF      Culture Results:   No growth to date. ( @ 00:56)  Culture Results:   No growth to date. ( @ 17:49)  Culture Results:   No growth to date. ( @ 17:49)      RADIOLOGY & ADDITIONAL STUDIES REVIEWED:  ***    89 M with no known PMH is BIBEMS after being found on floor in respiratory distress after 2 days, and with significant metabolic acidosis from ARF with oliguria from dehydration,  lactic acidosis and fevers. Pt admitted to MICU for Acute Hypoxic  Respiratory Failure and shock in setting of likely sepsis vs hypovolemia      Problem/Plan - 1:    Shock.     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   c/w MV  f/u prolactin to deescalate abx   droplet precaution        Problem/Plan - 4:     ARF    - most likely 2/2  ATN 2/2 dehydration   - pt presented with hemoconcentration and Cr of 4  - Cr worsening    - d/c fluids  low UO: 50, 10, 15          Problem/Plan - 5:     Metabolic acidosis.    improving   most likely 2/2 dehydration and ARF   lactate of 14 upon admission   s/p 4L of IVF  Lactate WNL   monitor UO  Dr Moya for nephrology.          Problem/Plan - 5:    ·Problem: Afib.      Plan: unknown if new or old  rate not controlled   increase BB dose   pt completed amio loading, started on PO amiodarone   heparin on hold since pt is having BRBPR  will initiate heparin gtt after negative head CT   CE trended down, most likely demand   f/u echo       Problem/Plan - 6:    Prophylactic measure.  Plan: continue Protonix and dvt px with hsq.       CRITICAL CARE TIME SPENT: 35 minutes [ ]DNR    [ ]DNI    [ ]MEWS SUSPENDED     [ ]GOALS OF CARE DISCUSSION WITH PATIENT/FAMILY/PROXY:    INTERVAL HPI/OVERNIGHT EVENTS:  no overnight events     PRESSORS: [ x] YES [ ] NO  WHICH: pheny     ANTIBIOTICS:           rocephin        DATE STARTED:   ANTIBIOTICS:         clindamycin           DATE STARTED:   ANTIBIOTICS:                  DATE STARTED:    ALBUTerol    90 MICROgram(s) HFA Inhaler 2 Puff(s) Inhalation every 4 hours  amiodarone    Tablet 400 milliGRAM(s) Oral two times a day  azithromycin  IVPB 500 milliGRAM(s) IV Intermittent every 24 hours  clindamycin IVPB      clindamycin IVPB 600 milliGRAM(s) IV Intermittent every 8 hours  ipratropium 17 MICROgram(s) HFA Inhaler 1 Puff(s) Inhalation every 4 hours  lactobacillus acidophilus 1 Tablet(s) Oral every 8 hours  metoprolol     tartrate 12.5 milliGRAM(s) Oral two times a day  oseltamivir 30 milliGRAM(s) Oral daily  pantoprazole  Injectable 40 milliGRAM(s) IV Push daily  phenylephrine    Infusion 0.5 MICROgram(s)/kG/Min IV Continuous <Continuous>  propofol Infusion 5 MICROgram(s)/kG/Min IV Continuous <Continuous>  sevelamer hydrochloride 800 milliGRAM(s) Oral three times a day with meals  sodium chloride 0.45%. 1000 milliLiter(s) IV Continuous <Continuous>      CENTRAL LINE: [ ] YES [ ] NO  LOCATION:   DATE INSERTED:  REMOVE: [ ] YES [ ] NO  EXPLAIN:    STEPHANIA: [ ] YES [ ] NO    DATE INSERTED:  REMOVE:  [ ] YES [ ] NO  EXPLAIN:    PMH -reviewed admission note, no change since admission  PAST MEDICAL & SURGICAL HISTORY:      T(C): 36.4 (18 @ 05:00), Max: 37.1 (18 @ 12:30)  HR: 124 (18 @ 08:13)  BP: 114/79 (18 @ 07:00)  RR: 25 (18 @ 07:00)  SpO2: 100% (18 @ 08:13)  Wt(kg): --       @ 07:01  -   @ 07:00  --------------------------------------------------------  IN: 2454.9 mL / OUT: 370 mL / NET: 2084.9 mL        PHYSICAL EXAM:    GENERAL: NAD  HEENT: dry mucosa, PERRLA  NECK: supple neck, no JVD  CVS: tachycardic, no RMG  CHEST/LUNG:  CTAx2, no wheezing, no rales, no rhonchi  ABDOMEN:  soft, non tender. no distention, no rigidity, decrease BS   EXTREMITIES:   no edema, no cyanosis   SKIN:  no rashes, no blisters  Neuro: sedated           LABS:  CBC Full  -  ( 2018 03:56 )  WBC Count : 18.3 K/uL  Hemoglobin : 13.3 g/dL  Hematocrit : 42.1 %  Platelet Count - Automated : 194 K/uL  Mean Cell Volume : 99.6 fl  Mean Cell Hemoglobin : 31.4 pg  Mean Cell Hemoglobin Concentration : 31.6 gm/dL  Auto Neutrophil # : 16.4 K/uL  Auto Lymphocyte # : 1.1 K/uL  Auto Monocyte # : 0.6 K/uL  Auto Eosinophil # : 0.0 K/uL  Auto Basophil # : 0.1 K/uL  Auto Neutrophil % : 89.9 %  Auto Lymphocyte % : 6.0 %  Auto Monocyte % : 3.3 %  Auto Eosinophil % : 0.1 %  Auto Basophil % : 0.7 %        144  |  113<H>  |  80<H>  ----------------------------<  191<H>  4.4   |  19<L>  |  4.56<H>    Ca    8.4      2018 03:56  Phos  6.0       Mg     2.6     18    TPro  6.0  /  Alb  1.9<L>  /  TBili  0.5  /  DBili  x   /  AST  332<H>  /  ALT  212<H>  /  AlkPhos  47  -18    PT/INR - ( 2018 03:56 )   PT: 17.8 sec;   INR: 1.62 ratio         PTT - ( 2018 03:56 )  PTT:26.8 sec  Urinalysis Basic - ( 2018 14:23 )    Color: Yellow / Appearance: Slightly Turbid / S.025 / pH: x  Gluc: x / Ketone: Trace  / Bili: Moderate / Urobili: Negative   Blood: x / Protein: 100 / Nitrite: Negative   Leuk Esterase: Trace / RBC: 25-50 /HPF / WBC 6-10 /HPF   Sq Epi: x / Non Sq Epi: Few /HPF / Bacteria: Many /HPF      Culture Results:   No growth to date. ( @ 00:56)  Culture Results:   No growth to date. ( @ 17:49)  Culture Results:   No growth to date. ( @ 17:49)      RADIOLOGY & ADDITIONAL STUDIES REVIEWED:  ***    89 M with no known PMH is BIBEMS after being found on floor in respiratory distress after 2 days, and with significant metabolic acidosis from ARF with oliguria from dehydration,  lactic acidosis and fevers. Pt admitted to MICU for Acute Hypoxic  Respiratory Failure and shock in setting of likely sepsis vs hypovolemia      Problem/Plan - 1:    Shock.     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   c/w MV  f/u prolactin to deescalate abx   droplet precaution        Problem/Plan - 4:     ARF    - most likely 2/2  ATN 2/2 dehydration   - pt presented with hemoconcentration and Cr of 4  - Cr worsening    - d/c fluids  low UO: 50, 10, 15          Problem/Plan - 5:     Metabolic acidosis.    improving   most likely 2/2 dehydration and ARF   lactate of 14 upon admission   s/p 4L of IVF  Lactate WNL   monitor UO  Dr Moya for nephrology.          Problem/Plan - 5:    ·Problem: Afib.        Plan: unknown if new or old  rate not controlled   increase BB dose   pt completed amio loading, started on PO amiodarone   heparin on hold since pt is having BRBPR  will initiate heparin gtt after negative head CT   CE trended down, most likely demand   f/u echo         Problem/Plan - 6: Transaminitis    Abd US shows Sludge ball or questionable mass in the gallbladder measuring roughly 5 x   2 cm. Gallbladder wall thickening of 7 mm. Cholecystitis is not excluded; HIDA scan recommended. no duct dilatation    could also  be 2/2 hepatic congestion 2/2 HF  monitor LFT       Problem/Plan - 7:    Prophylactic measure.  Plan: continue Protonix and dvt px with hsq.       CRITICAL CARE TIME SPENT: 35 minutes [ ]DNR    [ ]DNI    [ ]MEWS SUSPENDED     [ ]GOALS OF CARE DISCUSSION WITH PATIENT/FAMILY/PROXY:    INTERVAL HPI/OVERNIGHT EVENTS:  no overnight events     PRESSORS: [ x] YES [ ] NO  WHICH: pheny     ANTIBIOTICS:           rocephin        DATE STARTED:   ANTIBIOTICS:         clindamycin           DATE STARTED:   ANTIBIOTICS:                  DATE STARTED:    ALBUTerol    90 MICROgram(s) HFA Inhaler 2 Puff(s) Inhalation every 4 hours  amiodarone    Tablet 400 milliGRAM(s) Oral two times a day  azithromycin  IVPB 500 milliGRAM(s) IV Intermittent every 24 hours  clindamycin IVPB      clindamycin IVPB 600 milliGRAM(s) IV Intermittent every 8 hours  ipratropium 17 MICROgram(s) HFA Inhaler 1 Puff(s) Inhalation every 4 hours  lactobacillus acidophilus 1 Tablet(s) Oral every 8 hours  metoprolol     tartrate 12.5 milliGRAM(s) Oral two times a day  oseltamivir 30 milliGRAM(s) Oral daily  pantoprazole  Injectable 40 milliGRAM(s) IV Push daily  phenylephrine    Infusion 0.5 MICROgram(s)/kG/Min IV Continuous <Continuous>  propofol Infusion 5 MICROgram(s)/kG/Min IV Continuous <Continuous>  sevelamer hydrochloride 800 milliGRAM(s) Oral three times a day with meals  sodium chloride 0.45%. 1000 milliLiter(s) IV Continuous <Continuous>      CENTRAL LINE: [ ] YES [ ] NO  LOCATION:   DATE INSERTED:  REMOVE: [ ] YES [ ] NO  EXPLAIN:    STEPHANIA: [ ] YES [ ] NO    DATE INSERTED:  REMOVE:  [ ] YES [ ] NO  EXPLAIN:    PMH -reviewed admission note, no change since admission  PAST MEDICAL & SURGICAL HISTORY:      T(C): 36.4 (18 @ 05:00), Max: 37.1 (18 @ 12:30)  HR: 124 (18 @ 08:13)  BP: 114/79 (18 @ 07:00)  RR: 25 (18 @ 07:00)  SpO2: 100% (18 @ 08:13)  Wt(kg): --       @ 07:01  -   @ 07:00  --------------------------------------------------------  IN: 2454.9 mL / OUT: 370 mL / NET: 2084.9 mL        PHYSICAL EXAM:    GENERAL: NAD  HEENT: dry mucosa, PERRLA  NECK: supple neck, no JVD  CVS: tachycardic, no RMG  CHEST/LUNG:  CTAx2, no wheezing, no rales, no rhonchi  ABDOMEN:  soft, non tender. no distention, no rigidity, decrease BS   EXTREMITIES:   no edema, no cyanosis   SKIN:  no rashes, no blisters  Neuro: sedated           LABS:  CBC Full  -  ( 2018 03:56 )  WBC Count : 18.3 K/uL  Hemoglobin : 13.3 g/dL  Hematocrit : 42.1 %  Platelet Count - Automated : 194 K/uL  Mean Cell Volume : 99.6 fl  Mean Cell Hemoglobin : 31.4 pg  Mean Cell Hemoglobin Concentration : 31.6 gm/dL  Auto Neutrophil # : 16.4 K/uL  Auto Lymphocyte # : 1.1 K/uL  Auto Monocyte # : 0.6 K/uL  Auto Eosinophil # : 0.0 K/uL  Auto Basophil # : 0.1 K/uL  Auto Neutrophil % : 89.9 %  Auto Lymphocyte % : 6.0 %  Auto Monocyte % : 3.3 %  Auto Eosinophil % : 0.1 %  Auto Basophil % : 0.7 %        144  |  113<H>  |  80<H>  ----------------------------<  191<H>  4.4   |  19<L>  |  4.56<H>    Ca    8.4      2018 03:56  Phos  6.0       Mg     2.6     18    TPro  6.0  /  Alb  1.9<L>  /  TBili  0.5  /  DBili  x   /  AST  332<H>  /  ALT  212<H>  /  AlkPhos  47  -18    PT/INR - ( 2018 03:56 )   PT: 17.8 sec;   INR: 1.62 ratio         PTT - ( 2018 03:56 )  PTT:26.8 sec  Urinalysis Basic - ( 2018 14:23 )    Color: Yellow / Appearance: Slightly Turbid / S.025 / pH: x  Gluc: x / Ketone: Trace  / Bili: Moderate / Urobili: Negative   Blood: x / Protein: 100 / Nitrite: Negative   Leuk Esterase: Trace / RBC: 25-50 /HPF / WBC 6-10 /HPF   Sq Epi: x / Non Sq Epi: Few /HPF / Bacteria: Many /HPF      Culture Results:   No growth to date. ( @ 00:56)  Culture Results:   No growth to date. ( @ 17:49)  Culture Results:   No growth to date. ( @ 17:49)      RADIOLOGY & ADDITIONAL STUDIES REVIEWED:  ***    89 M with no known PMH is BIBEMS after being found on floor in respiratory distress after 2 days, and with significant metabolic acidosis from ARF with oliguria from dehydration,  lactic acidosis and fevers. Pt admitted to MICU for Acute Hypoxic  Respiratory Failure and shock in setting of likely sepsis vs hypovolemia      Problem/Plan - 1:    Shock.     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   c/w MV  f/u prolactin to deescalate abx   droplet precaution        Problem/Plan - 4:     ARF    - most likely 2/2  ATN 2/2 dehydration   - pt presented with hemoconcentration and Cr of 4  - Cr worsening    - d/c fluids  low UO: 50, 10, 15          Problem/Plan - 5:     Metabolic acidosis.    improving   most likely 2/2 dehydration and ARF   lactate of 14 upon admission   s/p 4L of IVF  Lactate WNL   monitor UO  Dr Moya for nephrology.          Problem/Plan - 5:    ·Problem: Afib.        Plan: unknown if new or old  rate not controlled   increase BB dose   pt completed amio loading, started on PO amiodarone   heparin on hold since pt is having BRBPR  will initiate heparin gtt after negative head CT   CE trended down, most likely demand   f/u echo         Problem/Plan - 6: Transaminitis    Abd US shows Sludge ball or questionable mass in the gallbladder measuring roughly 5 x   2 cm. Gallbladder wall thickening of 7 mm. Cholecystitis is not excluded; HIDA scan recommended. no duct dilatation.  Nodular contour suggestive of cirrhosis. Increased echogenicity   and echotexture  could also  be 2/2 hepatic congestion 2/2 HF  monitor LFT       Problem/Plan - 7:    Prophylactic measure.  Plan: continue Protonix and dvt px with hsq.       CRITICAL CARE TIME SPENT: 35 minutes

## 2018-01-18 NOTE — CONSULT NOTE ADULT - SUBJECTIVE AND OBJECTIVE BOX
GI INITIAL CONSULT    HPI: 89M was found down on the floor for 2 days. All information obtained from chart and primary team. Pt may be a heavy EtOH drinker. Pt was found to have, among other ailments, ALONDRA (although unclear if true ALONDRA vs. ALONDRA on CKD), which is not improving. Pt is also influenza (+) and required intubation and ICU admission. Furthermore, despite normal Plt and normal spleen pt was found to have cirrhotic-appearing liver on U/S as well as elevated INR with elevated LFTs (AST > ALT) and normal bilirubin. Finally, per primary team, pt has had 2 episodes of hematochezia in setting of heparin gtt.    PMH: AFib  PSH: unknown    Meds: MEDICATIONS  (STANDING):  albumin human 25% IVPB 25 Gram(s) IV Intermittent every 8 hours  ALBUTerol    90 MICROgram(s) HFA Inhaler 2 Puff(s) Inhalation every 4 hours  amiodarone    Tablet 400 milliGRAM(s) Oral two times a day  ampicillin/sulbactam  IVPB      ampicillin/sulbactam  IVPB 1.5 Gram(s) IV Intermittent once  dexmedetomidine Infusion 0.5 MICROgram(s)/kG/Hr (10.2 mL/Hr) IV Continuous <Continuous>  ipratropium 17 MICROgram(s) HFA Inhaler 1 Puff(s) Inhalation every 4 hours  lactobacillus acidophilus 1 Tablet(s) Oral every 8 hours  metoprolol     tartrate 25 milliGRAM(s) Oral two times a day  octreotide  Injectable 100 MICROGram(s) SubCutaneous three times a day  oseltamivir 30 milliGRAM(s) Oral daily  pantoprazole  Injectable 40 milliGRAM(s) IV Push two times a day  phenylephrine    Infusion 0.5 MICROgram(s)/kG/Min (7.65 mL/Hr) IV Continuous <Continuous>  propofol Infusion 5 MICROgram(s)/kG/Min (2.448 mL/Hr) IV Continuous <Continuous>  sevelamer carbonate Powder 1600 milliGRAM(s) Enteral Tube three times a day with meals    SH: unable to obtain  FH: unable to obtain  ROS: unable to obtain    Vitals: T(C): 37.5 (01-18-18 @ 21:25)  T(F): 99.5 (01-18-18 @ 21:25), Max: 99.5 (01-18-18 @ 21:25)  HR: 98 (01-18-18 @ 22:00) (89 - 138)  BP: 110/72 (01-18-18 @ 22:00) (74/53 - 134/96)    Gen: intubated, sedated  Chest: coarse BS B/L  CVS: S1/S2  Abd: S/NT/ND                        13.3   18.3  )-----------( 194      ( 18 Jan 2018 03:56 )             42.1   01-18    144  |  113<H>  |  80<H>  ----------------------------<  191<H>  4.4   |  19<L>  |  4.56<H>    Ca    8.4      18 Jan 2018 03:56  Phos  6.0     01-18  Mg     2.6     01-18    TPro  6.0  /  Alb  1.9<L>  /  TBili  0.5  /  DBili  x   /  AST  332<H>  /  ALT  212<H>  /  AlkPhos  47  01-18    US Abdomen Complete (01.17.18 @ 11:36)  IMPRESSION:  Sludge ball or questionable mass in the gallbladder measuring roughly 5 x   2 cm. Gallbladder wall thickening of 7 mm. Cholecystitis is not excluded;   consider further evaluation with nuclear medicine HIDA scan.  Cirrhosis.  Right renal complex cystic lesion may represent a complex cyst or mass.   Further evaluation with contrast-enhanced abdominal MRI is recommended.

## 2018-01-19 NOTE — CONSULT NOTE ADULT - SUBJECTIVE AND OBJECTIVE BOX
HISTORY OF PRESENT ILLNESS: HPI:  89 M from home alone with no known PMH was BIBEMS for respiratory distress. Hx obtained from ED provider who spoke with pt's superintendent. Pt was found on floor of bathroom cyanotic and tachypneic. Pt had not been seen for 2 days apparently. In ED, pt is alert, awake following most commands but confused and not oriented to place or date. Pt denies all ROS. Pt is febrile when admitted with leukocytosis of >20k with hemoconcentration, lactate of 16, significant renal insufficiency, given 4 L of IVF resuscitation and pt is oliguric, acidemic to 7.28 and bicarb on 13. EKG was Afib and RVR to 140s and pt given 10mg of IV cardizem.   Pt's superintendent arrived and noted that at baseline pt walks with walker and is alert oriented and communicative at baseline. They are unaware of known medical problems, but note that pt smoked heavily in past and drank with some regularity.   In ICU, pt was intubated for airway protection and hemodynamic instability. (16 Jan 2018 13:55)      PAST MEDICAL & SURGICAL HISTORY:          MEDICATIONS:  MEDICATIONS  (STANDING):  albumin human 25% IVPB 25 Gram(s) IV Intermittent every 8 hours  ALBUTerol    90 MICROgram(s) HFA Inhaler 2 Puff(s) Inhalation every 4 hours  amiodarone    Tablet 400 milliGRAM(s) Oral two times a day  ampicillin/sulbactam  IVPB      ampicillin/sulbactam  IVPB 1.5 Gram(s) IV Intermittent every 12 hours  dexmedetomidine Infusion 0.5 MICROgram(s)/kG/Hr (10.2 mL/Hr) IV Continuous <Continuous>  ipratropium 17 MICROgram(s) HFA Inhaler 1 Puff(s) Inhalation every 4 hours  lactobacillus acidophilus 1 Tablet(s) Oral every 8 hours  metoprolol     tartrate 25 milliGRAM(s) Oral two times a day  octreotide  Injectable 100 MICROGram(s) SubCutaneous three times a day  oseltamivir 30 milliGRAM(s) Oral daily  pantoprazole  Injectable 40 milliGRAM(s) IV Push two times a day  phenylephrine    Infusion 0.5 MICROgram(s)/kG/Min (7.65 mL/Hr) IV Continuous <Continuous>  propofol Infusion 5 MICROgram(s)/kG/Min (2.448 mL/Hr) IV Continuous <Continuous>  sevelamer carbonate Powder 1600 milliGRAM(s) Enteral Tube three times a day with meals      Allergies    No Known Allergies    Intolerances        FAMILY HISTORY:    Non-contributary for premature coronary disease or sudden cardiac death    SOCIAL HISTORY:    [ ] Non-smoker  [ ] Smoker  [ ] Alcohol      REVIEW OF SYSTEMS:  [ ]chest pain  [  ]shortness of breath  [  ]palpitations  [  ]syncope  [ ]near syncope [ ]upper extremity weakness   [ ] lower extremity weakness  [  ]diplopia  [  ]altered mental status   [  ]fevers  [ ]chills [ ]nausea  [ ]vomitting  [  ]dysphagia    [ ]abdominal pain  [ ]melena  [ ]BRBPR    [  ]epistaxis  [  ]rash    [ ]lower extremity edema        [ ] All others negative	  [X ] Unable to obtain    PHYSICAL EXAM:  T(C): 37.1 (01-19-18 @ 12:00), Max: 38 (01-18-18 @ 23:28)  HR: 90 (01-19-18 @ 13:00) (86 - 138)  BP: 121/59 (01-19-18 @ 13:00) (74/53 - 149/90)  RR: 28 (01-19-18 @ 13:00) (9 - 34)  SpO2: 97% (01-19-18 @ 12:00) (65% - 100%)  Wt(kg): --  I&O's Summary    18 Jan 2018 07:01  -  19 Jan 2018 07:00  --------------------------------------------------------  IN: 1256.9 mL / OUT: 1710 mL / NET: -453.1 mL    19 Jan 2018 07:01  -  19 Jan 2018 13:06  --------------------------------------------------------  IN: 0 mL / OUT: 250 mL / NET: -250 mL          HEENT:   Normal oral mucosa,   Lymphatic: No obvious lymphadenopathy , no edema  Cardiovascular: Normal S1 S2, No JVD,  1/6 PEDRO murmur , Peripheral pulses palpable 2+ bilaterally  Respiratory: Coarse mech BS, normal effort 	  Gastrointestinal:  Soft, Non-tender, + BS	  Skin: No rashes, No cyanosis, warm to touch  Musculoskeletal: unable to assess  Psychiatry:  unable to assess Mood & affect     TELEMETRY: 	  afib  ECG:  	afib 145 BPM, RBBB, LAD  RADIOLOGY:   CXR : Study limited due to rotation.    ET tube, feeding tube, right central line again noted. No pneumothorax.    Increased interstitial lung markings without significant change. New   small left pleural effusion and adjacent opacity.    Heart size cannot be accurately assessed in this projection.        	  	  LABS:	 	    CARDIAC MARKERS:  CARDIAC MARKERS ( 17 Jan 2018 04:07 )  0.953 ng/mL / x     / 871 U/L / x     / 5.0 ng/mL  CARDIAC MARKERS ( 16 Jan 2018 20:35 )  1.370 ng/mL / x     / 1531 U/L / x     / 8.4 ng/mL  CARDIAC MARKERS ( 16 Jan 2018 14:26 )  0.909 ng/mL / x     / 1591 U/L / x     / 8.7 ng/mL                              12.6   16.2  )-----------( 179      ( 19 Jan 2018 06:10 )             38.0     Hb Trend: 12.6<--    01-19    145  |  113<H>  |  73<H>  ----------------------------<  231<H>  4.1   |  22  |  2.86<H>    Ca    8.3<L>      19 Jan 2018 06:10  Phos  3.5     01-19  Mg     2.5     01-19    TPro  6.2  /  Alb  1.9<L>  /  TBili  0.4  /  DBili  x   /  AST  163<H>  /  ALT  179<H>  /  AlkPhos  65  01-19    Creatinine Trend: 2.86<--, 4.56<--, 4.06<--, 3.82<--, 4.02<--, 3.80<--      ASSESSMENT/PLAN: 	89y Male ? cirrhosis found down by his superintendant,  admitted with respiratory failure new rapid afib, shock, found with severe LV dyfx  of unknown duration.    - Would D/C amio  - Dig load  - Expect some degree of tachycardia given shock  - Abx per MICU  - D/C lopressor  - anticoagulate if active GI bleeding excluded  - Remainder of cardiac w/u will depend on clinical course      I once again thank you for allowing me to participate in the care of your patient.  If you have any questions or concerns please do not hesitate to contact me.    Donald Logan MD, [facility], [facility]  [address] [place] [address]  PHONE:  [phone]  BEEPER : [phone]

## 2018-01-19 NOTE — PROGRESS NOTE ADULT - SUBJECTIVE AND OBJECTIVE BOX
pt seen ,  examined and case discussed with resident covering.    SUBJECTIVE:  pt is intubated/sedated and in nad    REVIEW OF SYSTEMS:  Unobtainable    Current meds:    albumin human 25% IVPB 25 Gram(s) IV Intermittent every 8 hours  ALBUTerol    90 MICROgram(s) HFA Inhaler 2 Puff(s) Inhalation every 4 hours  ampicillin/sulbactam  IVPB      ampicillin/sulbactam  IVPB 1.5 Gram(s) IV Intermittent every 12 hours  dexmedetomidine Infusion 0.5 MICROgram(s)/kG/Hr IV Continuous <Continuous>  heparin  Infusion.  Unit(s)/Hr IV Continuous <Continuous>  ipratropium 17 MICROgram(s) HFA Inhaler 1 Puff(s) Inhalation every 4 hours  lactobacillus acidophilus 1 Tablet(s) Oral every 8 hours  metoprolol     tartrate 25 milliGRAM(s) Oral two times a day  octreotide  Injectable 100 MICROGram(s) SubCutaneous three times a day  oseltamivir 30 milliGRAM(s) Oral daily  pantoprazole  Injectable 40 milliGRAM(s) IV Push two times a day  phenylephrine    Infusion 0.5 MICROgram(s)/kG/Min IV Continuous <Continuous>  sevelamer carbonate Powder 1600 milliGRAM(s) Enteral Tube three times a day with meals      Vital Signs    T(F): 98 (18 @ 15:50), Max: 100.4 (18 @ 23:28)  HR: 73 (18 @ 18:30) (73 - 112)  BP: 108/58 (18 @ 18:00) (93/60 - 151/74)  ABP: --  RR: 18 (18 @ 18:30) (9 - 33)  SpO2: 98% (18 @ 18:30) (65% - 100%)  Wt(kg): --  CVP(cm H2O): --  CO: --  PCWP: --    I and O's:     @ 07:01  -   @ 07:00  --------------------------------------------------------  IN:    amiodarone Infusion: 200.4 mL    IV PiggyBack: 400 mL    phenylephrine   Infusion: 453.7 mL    propofol Infusion: 450.8 mL    sodium chloride 0.45%: 950 mL  Total IN: 2454.9 mL    OUT:    Indwelling Catheter - Urethral: 370 mL  Total OUT: 370 mL    Total NET: 2084.9 mL       @ 07: @ 07:00  --------------------------------------------------------  IN:    dexmedetomidine Infusion: 99.7 mL    Enteral Tube Flush: 260 mL    Glucerna: 310 mL    IV PiggyBack: 50 mL    phenylephrine   Infusion: 238.4 mL    propofol Infusion: 48.8 mL    sodium chloride 0.45%: 250 mL  Total IN: 1256.9 mL    OUT:    Indwelling Catheter - Urethral: 1710 mL  Total OUT: 1710 mL    Total NET: -453.1 mL       @ 07: @ 19:29  --------------------------------------------------------  IN:  Total IN: 0 mL    OUT:    Indwelling Catheter - Urethral: 450 mL  Total OUT: 450 mL    Total NET: -450 mL        Daily     Daily Weight in k.9 (2018 07:00)    PHYSICAL EXAM:  Constitutional: intubated sedated.   Eyes: pinpoint, sluggish   ENMT: no external lesions   Neck :supple, no JVD  Respiratory: distant breath sounds with no rales or whezzing heard  Cardiovascular:S1, s2 present   Gastrointestinal: soft, non tender non distended and nl bs heard  Extremities: no cyanosis, edema or clubbing   Neurological: sedated       LABS:    CBC:                     12.6   16.2  )-----------( 179      ( 2018 06:10 )             38.0       BMP:        145  |  113<H>  |  73<H>  ----------------------------<  231<H>  4.1   |  22  |  2.86<H>      144  |  113<H>  |  80<H>  ----------------------------<  191<H>  4.4   |  19<L>  |  4.56<H>      149<H>  |  116<H>  |  73<H>  ----------------------------<  198<H>  4.3   |  20<L>  |  4.06<H>      150<H>  |  117<H>  |  70<H>  ----------------------------<  193<H>  4.3   |  20<L>  |  3.82<H>    Ca    8.3<L>      2018 06:10  Ca    8.4      2018 03:56  Ca    8.8      2018 10:06  Ca    8.8      2018 04:07  Phos  3.5       Phos  6.0       Phos  4.9       Mg     2.5       Mg     2.6       Mg     2.5         TPro  6.2  /  Alb  1.9<L>  /  TBili  0.4  /  DBili  x   /  AST  163<H>  /  ALT  179<H>  /  AlkPhos  65    TPro  6.0  /  Alb  1.9<L>  /  TBili  0.5  /  DBili  x   /  AST  332<H>  /  ALT  212<H>  /  AlkPhos  47    TPro  5.8<L>  /  Alb  2.2<L>  /  TBili  0.5  /  DBili  x   /  AST  291<H>  /  ALT  131<H>  /  AlkPhos  40            URINE STUDIES:    Sodium, Random Urine: 38 mmol/L ( @ 06:10)  Osmolality, Random Urine: 544 mos/kg ( @ 06:10)  Potassium, Random Urine: 24 mmol/L ( @ 06:10)    Potassium, Random Urine: 19 mmol/L ( @ 09:41)  Osmolality, Random Urine: 523 mos/kg ( @ 09:41)  Sodium, Random Urine: <5 mmol/L ( @ 09:41)    Osmolality, Random Urine: 424 mos/kg ( @ 18:10)  Creatinine, Random Urine: 117 mg/dL ( @ 18:10)  Sodium, Random Urine: 50 mmol/L ( @ 18:08)  Chloride, Random Urine: 49 mmol/L ( @ 18:08)  Potassium, Random Urine: 54 mmol/L ( @ 18:08)  Creatinine, Random Urine: 206 mg/dL ( @ 14:23)  Sodium, Random Urine: 31 mmol/L ( @ 14:23)  Osmolality, Random Urine: 417 mos/kg ( @ 14:23)                  RADIOLOGY & ADDITIONAL STUDIES:  < from: Xray Chest 1 View AP -PORTABLE-Routine (18 @ 07:55) >  EXAM:  XR CHEST PORTABLE  ROUTINE 1V                            PROCEDURE DATE:  2018          INTERPRETATION:  CLINICAL STATEMENT: Follow-up chest pain.    TECHNIQUE: AP view of the chest.    COMPARISON: 2018    FINDINGS/  IMPRESSION:  Study limited due to rotation.    ET tube, feeding tube, right central line again noted. No pneumothorax.    Increased interstitial lung markings without significant change. New   small left pleural effusion and adjacent opacity.    Heart size cannot be accurately assessed in this projection.    < end of copied text >

## 2018-01-19 NOTE — PROGRESS NOTE ADULT - ASSESSMENT
89 male from home, found on floor, +ALONDRA, dehydration, afib with RVR, encephalopathy was fluid resuscitated in ED, developed acute respiratory failure secondary to pulmonary edema intubated and admitted to ICU.     1. ALONDRA: R/O Hepato-renal syndrome: patient with liver cirrhosis and urine Na<5,on Albbumin plus octreotide , now renal function is improving with good U/O   - keep SBP>110  - Keep patient euvolemic and renal diet  - Avoid Nephrotoxic Meds/ Agents such as (NSAIDs, IV contrast, Aminoglycosides such as gentamicin, -Gadolinium contrast, Phosphate containing enemas, etc..)  - Adjust Medications according to eGFR  - f/u bmp daily  - elevated TG on propofol; monitor   - may need HD for fluid overload or hyperkalemia   -  GI consult noted     2. Hypernatremia.   - resolved  -r/o secondary to saline infusion, less likely hypovolemic  -f/u Na level q 12hrs.     3. Hyperkalemia.   - resolved   -secondary to alondra  -s/p calcium/insulin/dextrose given  -keep k >4 and <5  -may need hd if k is very high.    4. Hyperphosphatemia: sec to ALONDRA   -now improving     5. Systolic heart failure: care per ICU   6. Influenza: care per ICU team   7. DVT, GI ppx

## 2018-01-19 NOTE — PROGRESS NOTE ADULT - SUBJECTIVE AND OBJECTIVE BOX
[ ]DNR    [ ]DNI    [ ]MEWS SUSPENDED     [ ]GOALS OF CARE DISCUSSION WITH PATIENT/FAMILY/PROXY:    INTERVAL HPI/OVERNIGHT EVENTS:  no overnight events     PRESSORS: [ x] YES [ ] NO  WHICH: pheny     ANTIBIOTICS:         Unasyn        DATE STARTED: 1/19  ANTIBIOTICS:                   DATE STARTED:   ANTIBIOTICS:                  DATE STARTED:        albumin human 25% IVPB 25 Gram(s) IV Intermittent every 8 hours  ALBUTerol    90 MICROgram(s) HFA Inhaler 2 Puff(s) Inhalation every 4 hours  amiodarone    Tablet 400 milliGRAM(s) Oral two times a day  ampicillin/sulbactam  IVPB      ampicillin/sulbactam  IVPB 1.5 Gram(s) IV Intermittent every 12 hours  dexmedetomidine Infusion 0.5 MICROgram(s)/kG/Hr IV Continuous <Continuous>  ipratropium 17 MICROgram(s) HFA Inhaler 1 Puff(s) Inhalation every 4 hours  lactobacillus acidophilus 1 Tablet(s) Oral every 8 hours  metoprolol     tartrate 25 milliGRAM(s) Oral two times a day  octreotide  Injectable 100 MICROGram(s) SubCutaneous three times a day  oseltamivir 30 milliGRAM(s) Oral daily  pantoprazole  Injectable 40 milliGRAM(s) IV Push two times a day  phenylephrine    Infusion 0.5 MICROgram(s)/kG/Min IV Continuous <Continuous>  propofol Infusion 5 MICROgram(s)/kG/Min IV Continuous <Continuous>  sevelamer carbonate Powder 1600 milliGRAM(s) Enteral Tube three times a day with meals      CENTRAL LINE: [ x] YES [ ] NO  LOCATION: [facility]  DATE INSERTED: 1/16  REMOVE: [ ] YES [ ] NO  EXPLAIN:    STEPHANIA: [x ] YES [ ] NO    DATE INSERTED: 1/16  REMOVE:  [ ] YES [ ] NO  EXPLAIN:    PMH -reviewed admission note, no change since admission  PAST MEDICAL & SURGICAL HISTORY:      T(C): 37.3 (01-19-18 @ 05:30), Max: 38 (01-18-18 @ 23:28)  HR: 101 (01-19-18 @ 07:00)  BP: 124/79 (01-19-18 @ 07:00)  RR: 21 (01-19-18 @ 07:00)  SpO2: 91% (01-19-18 @ 06:00)  Wt(kg): --      01-18 @ 07:01  -  01-19 @ 07:00  --------------------------------------------------------  IN: 1256.9 mL / OUT: 1610 mL / NET: -353.1 mL        PHYSICAL EXAM:    GENERAL: NAD  HEENT: dry mucosa, PERRLA  NECK: supple neck, no JVD  CVS: tachycardic, no RMG  CHEST/LUNG: CTAx2, no wheezing, no rales, no rhonchi  ABDOMEN:  soft, non tender. no distention, no rigidity   EXTREMITIES:   no edema, no cyanosis   SKIN:  no rashes, no blisters  Neuro: sedated       LABS:  CBC Full  -  ( 19 Jan 2018 06:10 )  WBC Count : 16.2 K/uL  Hemoglobin : 12.6 g/dL  Hematocrit : 38.0 %  Platelet Count - Automated : 179 K/uL  Mean Cell Volume : 95.5 fl  Mean Cell Hemoglobin : 31.7 pg  Mean Cell Hemoglobin Concentration : 33.2 gm/dL  Auto Neutrophil # : 14.5 K/uL  Auto Lymphocyte # : 0.9 K/uL  Auto Monocyte # : 0.6 K/uL  Auto Eosinophil # : 0.0 K/uL  Auto Basophil # : 0.2 K/uL  Auto Neutrophil % : 89.5 %  Auto Lymphocyte % : 5.5 %  Auto Monocyte % : 4.0 %  Auto Eosinophil % : 0.1 %  Auto Basophil % : 1.0 %    01-19    145  |  113<H>  |  73<H>  ----------------------------<  231<H>  4.1   |  22  |  2.86<H>    Ca    8.3<L>      19 Jan 2018 06:10  Phos  3.5     01-19  Mg     2.5     01-19    TPro  6.2  /  Alb  1.9<L>  /  TBili  0.4  /  DBili  x   /  AST  163<H>  /  ALT  179<H>  /  AlkPhos  65  01-19    PT/INR - ( 18 Jan 2018 03:56 )   PT: 17.8 sec;   INR: 1.62 ratio         PTT - ( 18 Jan 2018 03:56 )  PTT:26.8 sec    Culture Results:   Normal Respiratory Yulisa present (01-17 @ 00:56)  Culture Results:   No growth to date. (01-16 @ 17:49)  Culture Results:   Growth in anaerobic bottle: Gram Positive Rods (01-16 @ 17:49)      RADIOLOGY & ADDITIONAL STUDIES REVIEWED:      89 M with no known PMH is BIBEMS after being found on floor in respiratory distress after 2 days, and with significant metabolic acidosis from ARF with oliguria from dehydration,  lactic acidosis and fevers. Pt admitted to MICU for Acute Hypoxic  Respiratory Failure and shock in setting of likely sepsis vs hypovolemia      Problem/Plan - 1:    ARF    - most likely 2/2  ATN 2/2 dehydration   - HRS vs cardio renal syndrome  in the DDX  - off lasix and started on albumin and octreotide which show response in renal function and UO  low UO:            Problem/Plan - 2:    Acute hypoxic Respiratory failure     likely in setting of aspiration PNA and fluid overload after resuscitation    c/w  abx.   c/w MV  f/u prolactin to deescalate abx   droplet precaution        Problem/Plan - 3:     Shock     - bacteremia GPR--> started on Unasyn   - hypovolemic shock in the differential   -complicated by ALONDRA and acidosis   -still on phenylephrine    Flu positive --> c/w  Tamiflu renally dose  to complete 5 days          Problem/Plan - 4:    Metabolic acidosis.    resolved   most likely 2/2 dehydration and ARF            Problem/Plan - 5:    ·Problem: Afib.     Plan: unknown if new or old  c/w amiodarone, consider d/c since pt has liver disease   Heparin will be started today since BRBPR most likely hemorrhoidal   monitor CBC q12 after initiation      Problem/Plan - 6: Transaminitis    Abd US shows cirrhosis   most likely early stages on cirrhosis since pt has normal platelets, normal bili and no ascites   most likely decompensated due to increase INR and FT  could also  be 2/2 hepatic congestion 2/2 HF  monitor LFT       Problem/Plan - 5:    Prophylactic measure.  Plan: continue Protonix and dvt px with hsq.           CRITICAL CARE TIME SPENT: 35 minutes

## 2018-01-19 NOTE — PROGRESS NOTE ADULT - ATTENDING COMMENTS
89 male with unknown PMH was found down in his apartment, brought to the ED and found to be febrile with influenza,  ALONDRA, dehydration, afib with RVR, encephalopathy, aspiration pneumonia and septic shock.  Also found to have CHFrEF/cardiomyopathy.   ALONDRA resolving.  Per nephrology patient currently being treated for HRS.  Plan:  - continue mechanical ventilation  - phenylephrine being weaned off.  - empiric abx for possible aspiration  - rate/rhythm control with amiodarone metoprolol, restart heparin drip for AC  total cc time 35 min

## 2018-01-20 NOTE — PROGRESS NOTE ADULT - ASSESSMENT
89 M with no known PMH is BIBEMS after being found on floor in respiratory distress after 2 days, and with significant metabolic acidosis from ARF with oliguria from dehydration,  lactic acidosis and fevers. Pt admitted to MICU for Acute Hypoxic  Respiratory Failure and shock in setting of likely sepsis vs hypovolemia      Problem/Plan - 1:    ARF    - most likely 2/2  ATN 2/2 dehydration   - HRS vs cardio renal syndrome  in the DDX  - off lasix and started on albumin and octreotide which show response in renal function and UO  low UO:            Problem/Plan - 2:     Acute hypoxic Respiratory failure     likely in setting of aspiration PNA and fluid overload after resuscitation    c/w  abx.   c/w MV  f/u prolactin to deescalate abx   droplet precaution        Problem/Plan - 3:     Shock     - bacteremia GPR--> started on Unasyn   - hypovolemic shock in the differential   -complicated by ALONDRA and acidosis   -still on phenylephrine    Flu positive --> c/w  Tamiflu renally dose  to complete 5 days          Problem/Plan - 4:    Metabolic acidosis.    resolved   most likely 2/2 dehydration and ARF            Problem/Plan - 5:    ·Problem: Afib.     Plan: unknown if new or old  c/w amiodarone, consider d/c since pt has liver disease   Heparin will be started today since BRBPR most likely hemorrhoidal   monitor CBC q12 after initiation      Problem/Plan - 6: Transaminitis    Abd US shows cirrhosis   most likely early stages on cirrhosis since pt has normal platelets, normal bili and no ascites   most likely decompensated due to increase INR and FT  could also  be 2/2 hepatic congestion 2/2 HF  monitor LFT       Problem/Plan - 5:    Prophylactic measure.  Plan: continue Protonix and dvt px with hsq. 89 M with no known PMH is BIBEMS after being found on floor in respiratory distress after 2 days, and with significant metabolic acidosis from ARF with oliguria from dehydration,  lactic acidosis and fevers. Pt admitted to MICU for Acute Hypoxic  Respiratory Failure and shock in setting of likely sepsis vs hypovolemia      Problem/Plan - 1:  ARF  - most likely 2/2  ATN 2/2 dehydration   - HRS vs cardio renal syndrome  in the DDX  - off lasix and started on albumin and octreotide which show response in renal function and UO  low UO:        Problem/Plan - 2:   Acute hypoxic Respiratory failure   - likely in setting of aspiration PNA and fluid overload after resuscitation    - c/w  abx.   - c/w MV  - Prolactin 6.13  - droplet precaution        Problem/Plan - 3:   Shock   - bacteremia GPR-->on Unasyn   - hypovolemic shock in the differential   -complicated by ALONDRA and acidosis   -still on phenylephrine    - Flu positive --> c/w  Tamiflu renally dose  to complete 5 days          Problem/Plan - 4:  Metabolic acidosis.  - resolved   - most likely 2/2 dehydration and ARF          Problem/Plan - 5:  Afib.   - unknown if new or old  - was loaded with amiodarone and started on maintenance but later discontinued as patient already has transaminitis.   - Now on digoxin   - c/w Heparin drip  - monitor CBC q12         Problem/Plan - 6: Transaminitis    Abd US shows cirrhosis   most likely early stages on cirrhosis since pt has normal platelets, normal bili and no ascites   most likely decompensated due to increase INR and FT  could also  be 2/2 hepatic congestion 2/2 HF  monitor LFT       Problem/Plan - 5:    Prophylactic measure.  Plan: continue Protonix and dvt px with hsq. 89 M with no known PMH is BIBEMS after being found on floor in respiratory distress after 2 days, and with significant metabolic acidosis from ARF with oliguria from dehydration,  lactic acidosis and fevers. Pt admitted to MICU for Acute Hypoxic  Respiratory Failure and shock in setting of likely sepsis vs hypovolemia      Problem/Plan - 1:  ARF  - most likely 2/2  ATN 2/2 dehydration   - HRS vs cardio renal syndrome  in the DDX  - off lasix and started on albumin and octreotide which show response in renal function and UO  low UO:        Problem/Plan - 2:   Acute hypoxic Respiratory failure   - likely in setting of aspiration PNA and fluid overload after resuscitation    - c/w  abx.   - c/w MV  - Prolactin 6.13  - droplet precaution        Problem/Plan - 3:   Shock   - bacteremia GPR-->on Unasyn   - hypovolemic shock in the differential   -complicated by ALONDRA and acidosis   -still on phenylephrine    - Flu positive --> c/w  Tamiflu renally dose  to complete 5 days          Problem/Plan - 4:  Metabolic acidosis.  - resolved   - most likely 2/2 dehydration and ARF          Problem/Plan - 5:  Afib.   - unknown if new or old  - was loaded with amiodarone and started on maintenance but later discontinued as patient already has transaminitis.   - Now on digoxin , renally dosed, 0.25 on 1/19, 0.25 today and starting brodie 1/21  - c/w Heparin drip  - monitor CBC q12         Problem/Plan - 6: Transaminitis    Abd US shows cirrhosis   most likely early stages on cirrhosis since pt has normal platelets, normal bili and no ascites   most likely decompensated due to increase INR and FT  could also  be 2/2 hepatic congestion 2/2 HF  monitor LFT       Problem/Plan - 7:    Prophylactic measure.  Plan: continue Protonix and dvt px with hsq.

## 2018-01-20 NOTE — PROGRESS NOTE ADULT - ASSESSMENT
89 male from home, found on floor, +ALONDRA, dehydration, afib with RVR, encephalopathy was fluid resuscitated in ED, developed acute respiratory failure secondary to pulmonary edema intubated and admitted to ICU.     1. ALONDRA: R/O Hepato-renal syndrome: patient with liver cirrhosis and urine Na<5,on Albbumin plus octreotide , now renal function is improving with good U/O   - keep SBP>110  - Keep patient euvolemic and renal diet  - Avoid Nephrotoxic Meds/ Agents such as (NSAIDs, IV contrast, Aminoglycosides such as gentamicin, -Gadolinium contrast, Phosphate containing enemas, etc..)  - Adjust Medications according to eGFR  - f/u bmp daily    2. Hypernatremia.   - resolved  -r/o secondary to saline infusion, less likely hypovolemic  -f/u Na level q 12hrs.     3. Hyperkalemia.   - resolved   -secondary to alondra  -keep k >4 and <5    4. Hyperphosphatemia: sec to ALONDRA   -now with nl phos level  5.R/o CKD:r/o stage 3 as per pcp , secondary to htn?  -Keep patient euvolemic and renal diet  -Avoid Nephrotoxic Meds/ Agents such as (NSAIDs, IV contrast, Aminoglycosides such as gentamicin, -Gadolinium contrast, Phosphate containing enemas, etc..)  -Adjust Medications according to eGFR

## 2018-01-20 NOTE — PROGRESS NOTE ADULT - ATTENDING COMMENTS
89 male with unknown PMH was found down in his apartment, brought to the ED and found to be febrile with influenza,  ALONDRA, dehydration, afib with RVR, encephalopathy, aspiration pneumonia and septic shock.  Also found to have CHFrEF/cardiomyopathy.   ALONDRA resolving.  Per nephrology patient currently being treated for HRS.  Extubated 1/20  Plan:  - tamiflu and abx  - metoprolol and digoxin for rate control, heparin drip for AC  - last day of octreotide and albumin for HRS  - OOB, PT eval, swallow eval

## 2018-01-20 NOTE — PROGRESS NOTE ADULT - SUBJECTIVE AND OBJECTIVE BOX
Patient intubated appears alert  	  MEDICATIONS:  MEDICATIONS  (STANDING):  albumin human 25% IVPB 25 Gram(s) IV Intermittent every 8 hours  ALBUTerol    90 MICROgram(s) HFA Inhaler 2 Puff(s) Inhalation every 4 hours  ampicillin/sulbactam  IVPB      ampicillin/sulbactam  IVPB 1.5 Gram(s) IV Intermittent every 12 hours  dexmedetomidine Infusion 0.5 MICROgram(s)/kG/Hr (10.2 mL/Hr) IV Continuous <Continuous>  digoxin  Injectable 0.25 milliGRAM(s) IV Push once  heparin  Infusion.  Unit(s)/Hr (15 mL/Hr) IV Continuous <Continuous>  ipratropium 17 MICROgram(s) HFA Inhaler 1 Puff(s) Inhalation every 4 hours  lactobacillus acidophilus 1 Tablet(s) Oral every 8 hours  metoprolol     tartrate 25 milliGRAM(s) Oral two times a day  octreotide  Injectable 100 MICROGram(s) SubCutaneous three times a day  oseltamivir 30 milliGRAM(s) Oral daily  pantoprazole  Injectable 40 milliGRAM(s) IV Push two times a day  phenylephrine    Infusion 0.5 MICROgram(s)/kG/Min (7.65 mL/Hr) IV Continuous <Continuous>  sevelamer carbonate Powder 1600 milliGRAM(s) Enteral Tube three times a day with meals      LABS:	 	    CARDIAC MARKERS:                                12.0   10.8  )-----------( 130      ( 20 Jan 2018 06:59 )             37.5     Hemoglobin: 12.0 g/dL (01-20 @ 06:59)  Hemoglobin: 11.7 g/dL (01-19 @ 23:24)  Hemoglobin: 12.6 g/dL (01-19 @ 06:10)  Hemoglobin: 13.3 g/dL (01-18 @ 03:56)  Hemoglobin: 13.5 g/dL (01-17 @ 18:38)      01-20    149<H>  |  116<H>  |  78<H>  ----------------------------<  305<H>  4.7   |  24  |  2.56<H>    Ca    8.4      20 Jan 2018 06:59  Phos  4.1     01-20  Mg     2.7     01-20    TPro  6.1  /  Alb  2.6<L>  /  TBili  0.7  /  DBili  x   /  AST  52<H>  /  ALT  96<H>  /  AlkPhos  51  01-20    Creatinine Trend: 2.56<--, 2.86<--, 4.56<--, 4.06<--, 3.82<--, 4.02<--    COAGS:   PTT - ( 20 Jan 2018 06:59 )  PTT:90.3 sec        PHYSICAL EXAM:  T(C): 35.7 (01-20-18 @ 07:00), Max: 37.1 (01-19-18 @ 12:00)  HR: 110 (01-20-18 @ 11:00) (63 - 110)  BP: 119/60 (01-20-18 @ 11:00) (87/49 - 151/74)  RR: 29 (01-20-18 @ 11:00) (13 - 39)  SpO2: 76% (01-20-18 @ 11:00) (76% - 100%)  Wt(kg): --  I&O's Summary    19 Jan 2018 07:01  -  20 Jan 2018 07:00  --------------------------------------------------------  IN: 1853.5 mL / OUT: 1575 mL / NET: 278.5 mL    20 Jan 2018 07:01  -  20 Jan 2018 11:56  --------------------------------------------------------  IN: 45 mL / OUT: 210 mL / NET: -165 mL          HEENT:   Normal oral mucosa,   Lymphatic: No obvious lymphadenopathy , no edema  Cardiovascular: Normal S1 S2, No JVD, 1/6 PEDRO murmur, Peripheral pulses palpable 2+ bilaterally  Respiratory: Coarse mechanical BS, normal effort 	  Gastrointestinal:  Soft, Non-tender, + BS	  Skin: No rashes,  No cyanosis, warm to touch  Musculoskeletal: unable to fully assess  Psychiatry:  Unable to asses Mood & affect     TELEMETRY: 	afib        ASSESSMENT/PLAN: 	89y Male ? cirrhosis found down by his superintendant,  admitted with respiratory failure new rapid afib, shock, found with severe LV dyfx  of unknown duration.    - Complete Dig load  - Heparin gtt if not contra indicated  - allow some degree of tachycardia given critical illness  - Remainder of cardiac w/u pending clinical course    Donald Logan MD, [facility]  [facility], [facility]  [address] [place] [address]  PHONE:  [phone]  BEEPER : [phone]

## 2018-01-20 NOTE — PROGRESS NOTE ADULT - SUBJECTIVE AND OBJECTIVE BOX
INTERVAL HPI/ OVERNIGHT EVENTS:  Heart rate better controlled.      PRESSORS: [ x ] YES [ ] NO  WHICH:    ANTIBIOTICS:  Unasyn               DATE STARTED: 1/18      Antimicrobial:  ampicillin/sulbactam  IVPB      ampicillin/sulbactam  IVPB 1.5 Gram(s) IV Intermittent every 12 hours  oseltamivir 30 milliGRAM(s) Oral daily    Cardiovascular:  digoxin  Injectable 0.25 milliGRAM(s) IV Push once  metoprolol     tartrate 25 milliGRAM(s) Oral two times a day  phenylephrine    Infusion 0.5 MICROgram(s)/kG/Min IV Continuous <Continuous>    Pulmonary:  ALBUTerol    90 MICROgram(s) HFA Inhaler 2 Puff(s) Inhalation every 4 hours  ipratropium 17 MICROgram(s) HFA Inhaler 1 Puff(s) Inhalation every 4 hours    Hematalogic:  heparin  Infusion.  Unit(s)/Hr IV Continuous <Continuous>    Other:  albumin human 25% IVPB 25 Gram(s) IV Intermittent every 8 hours  dexmedetomidine Infusion 0.5 MICROgram(s)/kG/Hr IV Continuous <Continuous>  lactobacillus acidophilus 1 Tablet(s) Oral every 8 hours  octreotide  Injectable 100 MICROGram(s) SubCutaneous three times a day  pantoprazole  Injectable 40 milliGRAM(s) IV Push two times a day  sevelamer carbonate Powder 1600 milliGRAM(s) Enteral Tube three times a day with meals    albumin human 25% IVPB 25 Gram(s) IV Intermittent every 8 hours  ALBUTerol    90 MICROgram(s) HFA Inhaler 2 Puff(s) Inhalation every 4 hours  ampicillin/sulbactam  IVPB      ampicillin/sulbactam  IVPB 1.5 Gram(s) IV Intermittent every 12 hours  dexmedetomidine Infusion 0.5 MICROgram(s)/kG/Hr IV Continuous <Continuous>  digoxin  Injectable 0.25 milliGRAM(s) IV Push once  heparin  Infusion.  Unit(s)/Hr IV Continuous <Continuous>  ipratropium 17 MICROgram(s) HFA Inhaler 1 Puff(s) Inhalation every 4 hours  lactobacillus acidophilus 1 Tablet(s) Oral every 8 hours  metoprolol     tartrate 25 milliGRAM(s) Oral two times a day  octreotide  Injectable 100 MICROGram(s) SubCutaneous three times a day  oseltamivir 30 milliGRAM(s) Oral daily  pantoprazole  Injectable 40 milliGRAM(s) IV Push two times a day  phenylephrine    Infusion 0.5 MICROgram(s)/kG/Min IV Continuous <Continuous>  sevelamer carbonate Powder 1600 milliGRAM(s) Enteral Tube three times a day with meals    Drug Dosing Weight  Height (cm): 162.56 (16 Jan 2018 16:00)  Weight (kg): 81.6 (16 Jan 2018 16:00)  BMI (kg/m2): 30.9 (16 Jan 2018 16:00)  BSA (m2): 1.87 (16 Jan 2018 16:00)    CENTRAL LINE: [ x ] YES [ ] NO  LOCATION:  [facility]  DATE INSERTED: 1/16  REMOVE: [ ] YES [ ] NO  EXPLAIN:    CAT: [ x ] YES [ ] NO    DATE INSERTED: 1/16  REMOVE:  [ ] YES [ ] NO  EXPLAIN:    A-LINE:  [ ] YES [ x ] NO  LOCATION:   DATE INSERTED:  REMOVE:  [ ] YES [ ] NO  EXPLAIN:    PMH -reviewed admission note, no change since admission  Heart faliure: acute [ ] chronic [ ] acute or chronic [ ] diastolic [ ] systolic [ ] combied systolic and diastolic[ ]  ALONDRA: ATN[ ] renal medullary necrosis [ ] CKD I [ ]CKDII [ ]CKD III [ ]CKD IV [ ]CKD V [ ]Other pathological lesions [ ]  Abdominal Nutrition Status: malnutrition [ ] cachexia [ ] morbid obesity/BMI=40 [ ] Supplement ordered [___________]     ICU Vital Signs Last 24 Hrs  T(C): 35.8 (19 Jan 2018 23:55), Max: 38 (19 Jan 2018 09:00)  T(F): 96.4 (19 Jan 2018 23:55), Max: 100.4 (19 Jan 2018 09:00)  HR: 69 (20 Jan 2018 02:39) (67 - 112)  BP: 103/60 (20 Jan 2018 02:00) (87/49 - 151/74)  BP(mean): 68 (20 Jan 2018 02:00) (58 - 102)  ABP: --  ABP(mean): --  RR: 18 (20 Jan 2018 02:00) (9 - 39)  SpO2: 98% (20 Jan 2018 02:39) (65% - 100%)      ABG - ( 19 Jan 2018 04:34 )  pH: 7.45  /  pCO2: 27    /  pO2: 108   / HCO3: 18    / Base Excess: -3.7  /  SaO2: 98                    01-18 @ 07:01  -  01-19 @ 07:00  --------------------------------------------------------  IN: 1317.3 mL / OUT: 1710 mL / NET: -392.7 mL        Mode: AC/ CMV (Assist Control/ Continuous Mandatory Ventilation)  RR (machine): 20  TV (machine): 450  FiO2: 40  PEEP: 5  ITime: 1  MAP: 10  PC: 5  PIP: 23      PHYSICAL EXAM:    GENERAL: NAD  HEENT: dry mucosa, PERRLA  NECK: supple neck, no JVD  CVS: tachycardic, no RMG  CHEST/LUNG: CTAx2, no wheezing, no rales, no rhonchi  ABDOMEN:  soft, non tender. no distention, no rigidity   EXTREMITIES:   no edema, no cyanosis   SKIN:  no rashes, no blisters  Neuro: sedated         LABS:  CBC Full  -  ( 19 Jan 2018 23:24 )  WBC Count : 11.6 K/uL  Hemoglobin : 11.7 g/dL  Hematocrit : 37.9 %  Platelet Count - Automated : 128 K/uL  Mean Cell Volume : 99.2 fl  Mean Cell Hemoglobin : 30.6 pg  Mean Cell Hemoglobin Concentration : 30.8 gm/dL  Auto Neutrophil # : x  Auto Lymphocyte # : x  Auto Monocyte # : x  Auto Eosinophil # : x  Auto Basophil # : x  Auto Neutrophil % : x  Auto Lymphocyte % : x  Auto Monocyte % : x  Auto Eosinophil % : x  Auto Basophil % : x    01-19    145  |  113<H>  |  73<H>  ----------------------------<  231<H>  4.1   |  22  |  2.86<H>    Ca    8.3<L>      19 Jan 2018 06:10  Phos  3.5     01-19  Mg     2.5     01-19    TPro  6.2  /  Alb  1.9<L>  /  TBili  0.4  /  DBili  x   /  AST  163<H>  /  ALT  179<H>  /  AlkPhos  65  01-19    PT/INR - ( 19 Jan 2018 15:35 )   PT: 14.9 sec;   INR: 1.36 ratio         PTT - ( 19 Jan 2018 23:23 )  PTT:88.9 sec        RADIOLOGY & ADDITIONAL STUDIES REVIEWED:  ***    [ ]GOALS OF CARE DISCUSSION WITH PATIENT/FAMILY/PROXY:    CRITICAL CARE TIME SPENT: 35 minutes INTERVAL HPI/ OVERNIGHT EVENTS:  Heart rate better controlled.  No more episodes of BRBPR. On heparin drip.   PRESSORS: [ x ] YES [ ] NO  WHICH:    ANTIBIOTICS:  Unasyn               DATE STARTED: 1/18      Antimicrobial:  ampicillin/sulbactam  IVPB      ampicillin/sulbactam  IVPB 1.5 Gram(s) IV Intermittent every 12 hours  oseltamivir 30 milliGRAM(s) Oral daily    Cardiovascular:  digoxin  Injectable 0.25 milliGRAM(s) IV Push once  metoprolol     tartrate 25 milliGRAM(s) Oral two times a day  phenylephrine    Infusion 0.5 MICROgram(s)/kG/Min IV Continuous <Continuous>    Pulmonary:  ALBUTerol    90 MICROgram(s) HFA Inhaler 2 Puff(s) Inhalation every 4 hours  ipratropium 17 MICROgram(s) HFA Inhaler 1 Puff(s) Inhalation every 4 hours    Hematalogic:  heparin  Infusion.  Unit(s)/Hr IV Continuous <Continuous>    Other:  albumin human 25% IVPB 25 Gram(s) IV Intermittent every 8 hours  dexmedetomidine Infusion 0.5 MICROgram(s)/kG/Hr IV Continuous <Continuous>  lactobacillus acidophilus 1 Tablet(s) Oral every 8 hours  octreotide  Injectable 100 MICROGram(s) SubCutaneous three times a day  pantoprazole  Injectable 40 milliGRAM(s) IV Push two times a day  sevelamer carbonate Powder 1600 milliGRAM(s) Enteral Tube three times a day with meals    albumin human 25% IVPB 25 Gram(s) IV Intermittent every 8 hours  ALBUTerol    90 MICROgram(s) HFA Inhaler 2 Puff(s) Inhalation every 4 hours  ampicillin/sulbactam  IVPB      ampicillin/sulbactam  IVPB 1.5 Gram(s) IV Intermittent every 12 hours  dexmedetomidine Infusion 0.5 MICROgram(s)/kG/Hr IV Continuous <Continuous>  digoxin  Injectable 0.25 milliGRAM(s) IV Push once  heparin  Infusion.  Unit(s)/Hr IV Continuous <Continuous>  ipratropium 17 MICROgram(s) HFA Inhaler 1 Puff(s) Inhalation every 4 hours  lactobacillus acidophilus 1 Tablet(s) Oral every 8 hours  metoprolol     tartrate 25 milliGRAM(s) Oral two times a day  octreotide  Injectable 100 MICROGram(s) SubCutaneous three times a day  oseltamivir 30 milliGRAM(s) Oral daily  pantoprazole  Injectable 40 milliGRAM(s) IV Push two times a day  phenylephrine    Infusion 0.5 MICROgram(s)/kG/Min IV Continuous <Continuous>  sevelamer carbonate Powder 1600 milliGRAM(s) Enteral Tube three times a day with meals    Drug Dosing Weight  Height (cm): 162.56 (16 Jan 2018 16:00)  Weight (kg): 81.6 (16 Jan 2018 16:00)  BMI (kg/m2): 30.9 (16 Jan 2018 16:00)  BSA (m2): 1.87 (16 Jan 2018 16:00)    CENTRAL LINE: [ x ] YES [ ] NO  LOCATION:  [facility]  DATE INSERTED: 1/16  REMOVE: [ ] YES [ ] NO  EXPLAIN:    CAT: [ x ] YES [ ] NO    DATE INSERTED: 1/16  REMOVE:  [ ] YES [ ] NO  EXPLAIN:    A-LINE:  [ ] YES [ x ] NO  LOCATION:   DATE INSERTED:  REMOVE:  [ ] YES [ ] NO  EXPLAIN:    PMH -reviewed admission note, no change since admission  Heart faliure: acute [ ] chronic [ ] acute or chronic [ ] diastolic [ ] systolic [ ] combied systolic and diastolic[ ]  ALONDRA: ATN[ ] renal medullary necrosis [ ] CKD I [ ]CKDII [ ]CKD III [ ]CKD IV [ ]CKD V [ ]Other pathological lesions [ ]  Abdominal Nutrition Status: malnutrition [ ] cachexia [ ] morbid obesity/BMI=40 [ ] Supplement ordered [___________]     ICU Vital Signs Last 24 Hrs  T(C): 35.8 (19 Jan 2018 23:55), Max: 38 (19 Jan 2018 09:00)  T(F): 96.4 (19 Jan 2018 23:55), Max: 100.4 (19 Jan 2018 09:00)  HR: 69 (20 Jan 2018 02:39) (67 - 112)  BP: 103/60 (20 Jan 2018 02:00) (87/49 - 151/74)  BP(mean): 68 (20 Jan 2018 02:00) (58 - 102)  ABP: --  ABP(mean): --  RR: 18 (20 Jan 2018 02:00) (9 - 39)  SpO2: 98% (20 Jan 2018 02:39) (65% - 100%)      ABG - ( 19 Jan 2018 04:34 )  pH: 7.45  /  pCO2: 27    /  pO2: 108   / HCO3: 18    / Base Excess: -3.7  /  SaO2: 98                    01-18 @ 07:01  -  01-19 @ 07:00  --------------------------------------------------------  IN: 1317.3 mL / OUT: 1710 mL / NET: -392.7 mL        Mode: AC/ CMV (Assist Control/ Continuous Mandatory Ventilation)  RR (machine): 20  TV (machine): 450  FiO2: 40  PEEP: 5  ITime: 1  MAP: 10  PC: 5  PIP: 23      PHYSICAL EXAM:    GENERAL: NAD  HEENT: dry mucosa, PERRLA  NECK: supple neck, no JVD  CVS: tachycardic, no RMG  CHEST/LUNG: CTAx2, no wheezing, no rales, no rhonchi  ABDOMEN:  soft, non tender. no distention, no rigidity   EXTREMITIES:   no edema, no cyanosis   SKIN:  no rashes, no blisters  Neuro: sedated         LABS:  CBC Full  -  ( 19 Jan 2018 23:24 )  WBC Count : 11.6 K/uL  Hemoglobin : 11.7 g/dL  Hematocrit : 37.9 %  Platelet Count - Automated : 128 K/uL  Mean Cell Volume : 99.2 fl  Mean Cell Hemoglobin : 30.6 pg  Mean Cell Hemoglobin Concentration : 30.8 gm/dL  Auto Neutrophil # : x  Auto Lymphocyte # : x  Auto Monocyte # : x  Auto Eosinophil # : x  Auto Basophil # : x  Auto Neutrophil % : x  Auto Lymphocyte % : x  Auto Monocyte % : x  Auto Eosinophil % : x  Auto Basophil % : x    01-19    145  |  113<H>  |  73<H>  ----------------------------<  231<H>  4.1   |  22  |  2.86<H>    Ca    8.3<L>      19 Jan 2018 06:10  Phos  3.5     01-19  Mg     2.5     01-19    TPro  6.2  /  Alb  1.9<L>  /  TBili  0.4  /  DBili  x   /  AST  163<H>  /  ALT  179<H>  /  AlkPhos  65  01-19    PT/INR - ( 19 Jan 2018 15:35 )   PT: 14.9 sec;   INR: 1.36 ratio         PTT - ( 19 Jan 2018 23:23 )  PTT:88.9 sec        RADIOLOGY & ADDITIONAL STUDIES REVIEWED:  ***    [ ]GOALS OF CARE DISCUSSION WITH PATIENT/FAMILY/PROXY:    CRITICAL CARE TIME SPENT: 35 minutes INTERVAL HPI/ OVERNIGHT EVENTS:  Heart rate better controlled.  No more episodes of BRBPR. On heparin drip.     PRESSORS: [ x ] YES [ ] NO  WHICH: pheny     ANTIBIOTICS:  Unasyn               DATE STARTED: 1/18      Antimicrobial:  ampicillin/sulbactam  IVPB      ampicillin/sulbactam  IVPB 1.5 Gram(s) IV Intermittent every 12 hours  oseltamivir 30 milliGRAM(s) Oral daily    Cardiovascular:  digoxin  Injectable 0.25 milliGRAM(s) IV Push once  metoprolol     tartrate 25 milliGRAM(s) Oral two times a day  phenylephrine    Infusion 0.5 MICROgram(s)/kG/Min IV Continuous <Continuous>    Pulmonary:  ALBUTerol    90 MICROgram(s) HFA Inhaler 2 Puff(s) Inhalation every 4 hours  ipratropium 17 MICROgram(s) HFA Inhaler 1 Puff(s) Inhalation every 4 hours    Hematalogic:  heparin  Infusion.  Unit(s)/Hr IV Continuous <Continuous>    Other:  albumin human 25% IVPB 25 Gram(s) IV Intermittent every 8 hours  dexmedetomidine Infusion 0.5 MICROgram(s)/kG/Hr IV Continuous <Continuous>  lactobacillus acidophilus 1 Tablet(s) Oral every 8 hours  octreotide  Injectable 100 MICROGram(s) SubCutaneous three times a day  pantoprazole  Injectable 40 milliGRAM(s) IV Push two times a day  sevelamer carbonate Powder 1600 milliGRAM(s) Enteral Tube three times a day with meals    albumin human 25% IVPB 25 Gram(s) IV Intermittent every 8 hours  ALBUTerol    90 MICROgram(s) HFA Inhaler 2 Puff(s) Inhalation every 4 hours  ampicillin/sulbactam  IVPB      ampicillin/sulbactam  IVPB 1.5 Gram(s) IV Intermittent every 12 hours  dexmedetomidine Infusion 0.5 MICROgram(s)/kG/Hr IV Continuous <Continuous>  digoxin  Injectable 0.25 milliGRAM(s) IV Push once  heparin  Infusion.  Unit(s)/Hr IV Continuous <Continuous>  ipratropium 17 MICROgram(s) HFA Inhaler 1 Puff(s) Inhalation every 4 hours  lactobacillus acidophilus 1 Tablet(s) Oral every 8 hours  metoprolol     tartrate 25 milliGRAM(s) Oral two times a day  octreotide  Injectable 100 MICROGram(s) SubCutaneous three times a day  oseltamivir 30 milliGRAM(s) Oral daily  pantoprazole  Injectable 40 milliGRAM(s) IV Push two times a day  phenylephrine    Infusion 0.5 MICROgram(s)/kG/Min IV Continuous <Continuous>  sevelamer carbonate Powder 1600 milliGRAM(s) Enteral Tube three times a day with meals    Drug Dosing Weight  Height (cm): 162.56 (16 Jan 2018 16:00)  Weight (kg): 81.6 (16 Jan 2018 16:00)  BMI (kg/m2): 30.9 (16 Jan 2018 16:00)  BSA (m2): 1.87 (16 Jan 2018 16:00)    CENTRAL LINE: [ x ] YES [ ] NO  LOCATION:  [facility]  DATE INSERTED: 1/16  REMOVE: [ ] YES [ ] NO  EXPLAIN:    CAT: [ x ] YES [ ] NO    DATE INSERTED: 1/16  REMOVE:  [ ] YES [ ] NO  EXPLAIN:    A-LINE:  [ ] YES [ x ] NO  LOCATION:   DATE INSERTED:  REMOVE:  [ ] YES [ ] NO  EXPLAIN:    PMH -reviewed admission note, no change since admission  Heart faliure: acute [ ] chronic [ ] acute or chronic [ ] diastolic [ ] systolic [ ] combied systolic and diastolic[ ]  ALONDRA: ATN[ ] renal medullary necrosis [ ] CKD I [ ]CKDII [ ]CKD III [ ]CKD IV [ ]CKD V [ ]Other pathological lesions [ ]  Abdominal Nutrition Status: malnutrition [ ] cachexia [ ] morbid obesity/BMI=40 [ ] Supplement ordered [___________]     ICU Vital Signs Last 24 Hrs  T(C): 35.8 (19 Jan 2018 23:55), Max: 38 (19 Jan 2018 09:00)  T(F): 96.4 (19 Jan 2018 23:55), Max: 100.4 (19 Jan 2018 09:00)  HR: 69 (20 Jan 2018 02:39) (67 - 112)  BP: 103/60 (20 Jan 2018 02:00) (87/49 - 151/74)  BP(mean): 68 (20 Jan 2018 02:00) (58 - 102)  ABP: --  ABP(mean): --  RR: 18 (20 Jan 2018 02:00) (9 - 39)  SpO2: 98% (20 Jan 2018 02:39) (65% - 100%)      ABG - ( 19 Jan 2018 04:34 )  pH: 7.45  /  pCO2: 27    /  pO2: 108   / HCO3: 18    / Base Excess: -3.7  /  SaO2: 98                    01-18 @ 07:01  -  01-19 @ 07:00  --------------------------------------------------------  IN: 1317.3 mL / OUT: 1710 mL / NET: -392.7 mL        Mode: AC/ CMV (Assist Control/ Continuous Mandatory Ventilation)  RR (machine): 20  TV (machine): 450  FiO2: 40  PEEP: 5  ITime: 1  MAP: 10  PC: 5  PIP: 23      PHYSICAL EXAM:    GENERAL: NAD  HEENT: dry mucosa, PERRLA  NECK: supple neck, no JVD  CVS: tachycardic, no RMG  CHEST/LUNG: CTAx2, no wheezing, no rales, no rhonchi  ABDOMEN:  soft, non tender. no distention, no rigidity   EXTREMITIES:   no edema, no cyanosis   SKIN:  no rashes, no blisters  Neuro: sedated         LABS:  CBC Full  -  ( 19 Jan 2018 23:24 )  WBC Count : 11.6 K/uL  Hemoglobin : 11.7 g/dL  Hematocrit : 37.9 %  Platelet Count - Automated : 128 K/uL  Mean Cell Volume : 99.2 fl  Mean Cell Hemoglobin : 30.6 pg  Mean Cell Hemoglobin Concentration : 30.8 gm/dL  Auto Neutrophil # : x  Auto Lymphocyte # : x  Auto Monocyte # : x  Auto Eosinophil # : x  Auto Basophil # : x  Auto Neutrophil % : x  Auto Lymphocyte % : x  Auto Monocyte % : x  Auto Eosinophil % : x  Auto Basophil % : x    01-19    145  |  113<H>  |  73<H>  ----------------------------<  231<H>  4.1   |  22  |  2.86<H>    Ca    8.3<L>      19 Jan 2018 06:10  Phos  3.5     01-19  Mg     2.5     01-19    TPro  6.2  /  Alb  1.9<L>  /  TBili  0.4  /  DBili  x   /  AST  163<H>  /  ALT  179<H>  /  AlkPhos  65  01-19    PT/INR - ( 19 Jan 2018 15:35 )   PT: 14.9 sec;   INR: 1.36 ratio         PTT - ( 19 Jan 2018 23:23 )  PTT:88.9 sec        RADIOLOGY & ADDITIONAL STUDIES REVIEWED:  ***    [ ]GOALS OF CARE DISCUSSION WITH PATIENT/FAMILY/PROXY:    CRITICAL CARE TIME SPENT: 35 minutes

## 2018-01-20 NOTE — PROGRESS NOTE ADULT - SUBJECTIVE AND OBJECTIVE BOX
pt seen and examined.pts current chart reviewed and case discussed with resident covering.    SUBJECTIVE:  pt is awake,alert and in nad on nasal o2  pts U/O is very good    REVIEW OF SYSTEMS:  CONSTITUTIONAL: No weakness, fevers or chills  EYES/ENT: No visual changes;  No vertigo or throat pain   NECK: No pain or stiffness  RESPIRATORY: No cough, wheezing, hemoptysis; No shortness of breath  CARDIOVASCULAR: No chest pain or palpitations  GASTROINTESTINAL: No abdominal or epigastric pain. No nausea, vomiting, or hematemesis; No diarrhea or constipation. No melena or hematochezia.  GENITOURINARY: No dysuria, frequency , hematuria, flank pain or nocturia  NEUROLOGICAL: No numbness or weakness  SKIN: No itching, burning, rashes, or lesions   All other review of systems is negative unless indicated above    Current meds:    albumin human 25% IVPB 25 Gram(s) IV Intermittent every 8 hours  ALBUTerol    90 MICROgram(s) HFA Inhaler 2 Puff(s) Inhalation every 4 hours  ampicillin/sulbactam  IVPB 1.5 Gram(s) IV Intermittent every 12 hours  digoxin  Injectable 0.25 milliGRAM(s) IV Push once  ipratropium 17 MICROgram(s) HFA Inhaler 1 Puff(s) Inhalation every 4 hours  lactobacillus acidophilus 1 Tablet(s) Oral every 8 hours  metoprolol     tartrate 25 milliGRAM(s) Oral two times a day  octreotide  Injectable 100 MICROGram(s) SubCutaneous three times a day  oseltamivir 30 milliGRAM(s) Oral daily  pantoprazole  Injectable 40 milliGRAM(s) IV Push two times a day      Vital Signs    T(F): 98 (18 @ 16:00), Max: 98 (18 @ 16:00)  HR: 100 (18 @ 16:00) (63 - 110)  BP: 126/62 (18 @ 16:00) (87/49 - 132/63)  ABP: --  RR: 25 (18 @ 16:00) (13 - 39)  SpO2: 98% (18 @ 16:00) (76% - 100%)  Wt(kg): --  CVP(cm H2O): --  CO: --  PCWP: --    I and O's:     @ 07: @ 07:00  --------------------------------------------------------  IN:    dexmedetomidine Infusion: 107.9 mL    Enteral Tube Flush: 260 mL    Glucerna: 350 mL    phenylephrine   Infusion: 250.7 mL    propofol Infusion: 48.8 mL    sodium chloride 0.45%: 250 mL    Solution: 50 mL  Total IN: 1317.3 mL    OUT:    Indwelling Catheter - Urethral: 1710 mL  Total OUT: 1710 mL    Total NET: -392.7 mL       @ 07: @ 07:00  --------------------------------------------------------  IN:    dexmedetomidine Infusion: 208.3 mL    Enteral Tube Flush: 210 mL    Glucerna: 960 mL    heparin  Infusion.: 240 mL    phenylephrine   Infusion: 35.2 mL    Solution: 100 mL    Solution: 100 mL  Total IN: 1853.5 mL    OUT:    Indwelling Catheter - Urethral: 1575 mL  Total OUT: 1575 mL    Total NET: 278.5 mL       @ 07: @ 18:27  --------------------------------------------------------  IN:    heparin  Infusion.: 135 mL  Total IN: 135 mL    OUT:    Indwelling Catheter - Urethral: 435 mL  Total OUT: 435 mL    Total NET: -300 mL        Daily     Daily Weight in k.8 (2018 07:00)    PHYSICAL EXAM:  C  Constitutional:well developed, well nourished  and in nad  Eyes: non icteric sclare seen and mild palor of the conj.noted  Neck :supple, no JVD  Respiratory: distant breath sounds with no rales or whezzing heard  Cardiovascular:S1, s2 present   Gastrointestinal: soft, non tender non distended and nl bs heard  Extremities: no cyanosis, edema or clubbing   Neurological: alert and oriented      LABS:    CBC:                          11.4   10.7  )-----------( 150      ( 2018 17:01 )             36.4           BMP:        149<H>  |  116<H>  |  78<H>  ----------------------------<  305<H>  4.7   |  24  |  2.56<H>      145  |  113<H>  |  73<H>  ----------------------------<  231<H>  4.1   |  22  |  2.86<H>      144  |  113<H>  |  80<H>  ----------------------------<  191<H>  4.4   |  19<L>  |  4.56<H>    Ca    8.4      2018 06:59  Ca    8.3<L>      2018 06:10  Ca    8.4      2018 03:56  Phos  4.1       Phos  3.5       Phos  6.0       Mg     2.7       Mg     2.5       Mg     2.6         TPro  6.1  /  Alb  2.6<L>  /  TBili  0.7  /  DBili  x   /  AST  52<H>  /  ALT  96<H>  /  AlkPhos  51    TPro  6.2  /  Alb  1.9<L>  /  TBili  0.4  /  DBili  x   /  AST  163<H>  /  ALT  179<H>  /  AlkPhos  65    TPro  6.0  /  Alb  1.9<L>  /  TBili  0.5  /  DBili  x   /  AST  332<H>  /  ALT  212<H>  /  AlkPhos  47            URINE STUDIES:    Sodium, Random Urine: 38 mmol/L ( @ 06:10)  Osmolality, Random Urine: 544 mos/kg ( @ 06:10)  Potassium, Random Urine: 24 mmol/L ( @ 06:10)  Potassium, Random Urine: 19 mmol/L ( @ :41)  Osmolality, Random Urine: 523 mos/kg ( @ :41)  Sodium, Random Urine: <5 mmol/L (:41)  Osmolality, Random Urine: 424 mos/kg ( @ 18:10)  Creatinine, Random Urine: 117 mg/dL ( @ 18:10)  Sodium, Random Urine: 50 mmol/L ( 18:08)  Chloride, Random Urine: 49 mmol/L ( 18:08)  Potassium, Random Urine: 54 mmol/L ( 18:08)  Creatinine, Random Urine: 206 mg/dL ( 14:23)  Sodium, Random Urine: 31 mmol/L (:23)  Osmolality, Random Urine: 417 mos/kg (:23)

## 2018-01-21 NOTE — PROGRESS NOTE ADULT - SUBJECTIVE AND OBJECTIVE BOX
INTERVAL HPI/ OVERNIGHT EVENTS:  no events       PRESSORS: [ x ] YES [ ] NO  WHICH: pheny     ANTIBIOTICS:  Unasyn               DATE STARTED: 1/18    ampicillin/sulbactam  IVPB 1.5 Gram(s) IV Intermittent every 12 hours  digoxin  Injectable 0.125 milliGRAM(s) IV Push daily  lactobacillus acidophilus 1 Tablet(s) Oral every 8 hours  metoprolol     tartrate 25 milliGRAM(s) Oral two times a day  octreotide  Injectable 100 MICROGram(s) SubCutaneous three times a day  oseltamivir 30 milliGRAM(s) Oral daily  pantoprazole  Injectable 40 milliGRAM(s) IV Push two times a day      CENTRAL LINE: [x ] YES [ ] NO  LOCATION: [facility]    DATE INSERTED: 1/16  REMOVE: [ ] YES [ ] NO  EXPLAIN:    CAT: [x ] YES [ ] NO    DATE INSERTED: 1/19  REMOVE:  [ ] YES [ ] NO  EXPLAIN:    PMH -reviewed admission note, no change since admission  PAST MEDICAL & SURGICAL HISTORY:      T(C): 37.2 (01-20-18 @ 21:00), Max: 37.2 (01-20-18 @ 21:00)  HR: 108 (01-21-18 @ 01:00)  BP: 156/77 (01-21-18 @ 01:00)  RR: 13 (01-21-18 @ 01:00)  SpO2: 95% (01-21-18 @ 01:00)  Wt(kg): --      01-19 @ 07:01  -  01-20 @ 07:00  --------------------------------------------------------  IN: 1853.5 mL / OUT: 1575 mL / NET: 278.5 mL        PHYSICAL EXAM:    GENERAL: NAD  HEENT: dry mucosa, PERRLA  NECK: supple neck, no JVD  CVS: tachycardic, no RMG  CHEST/LUNG: CTAx2, no wheezing, no rales, no rhonchi  ABDOMEN:  soft, non tender. no distention, no rigidity   EXTREMITIES:   no edema, no cyanosis   SKIN:  no rashes, no blisters  Neuro: awake and alert       LABS:  CBC Full  -  ( 20 Jan 2018 17:01 )  WBC Count : 10.7 K/uL  Hemoglobin : 11.4 g/dL  Hematocrit : 36.4 %  Platelet Count - Automated : 150 K/uL  Mean Cell Volume : 99.3 fl  Mean Cell Hemoglobin : 31.0 pg  Mean Cell Hemoglobin Concentration : 31.2 gm/dL  Auto Neutrophil # : x  Auto Lymphocyte # : x  Auto Monocyte # : x  Auto Eosinophil # : x  Auto Basophil # : x  Auto Neutrophil % : x  Auto Lymphocyte % : x  Auto Monocyte % : x  Auto Eosinophil % : x  Auto Basophil % : x    01-20    149<H>  |  116<H>  |  78<H>  ----------------------------<  305<H>  4.7   |  24  |  2.56<H>    Ca    8.4      20 Jan 2018 06:59  Phos  4.1     01-20  Mg     2.7     01-20    TPro  6.1  /  Alb  2.6<L>  /  TBili  0.7  /  DBili  x   /  AST  52<H>  /  ALT  96<H>  /  AlkPhos  51  01-20    PT/INR - ( 19 Jan 2018 15:35 )   PT: 14.9 sec;   INR: 1.36 ratio         PTT - ( 20 Jan 2018 06:59 )  PTT:90.3 sec    Culture Results:   No growth to date. (01-19 @ 09:16)  Culture Results:   No growth to date. (01-19 @ 09:16)      RADIOLOGY & ADDITIONAL STUDIES REVIEWED:      89 M with no known PMH is BIBEMS after being found on floor in respiratory distress after 2 days, and with significant metabolic acidosis from ARF with oliguria from dehydration,  lactic acidosis and fevers. Pt admitted to MICU for Acute Hypoxic  Respiratory Failure and shock in setting of likely sepsis vs hypovolemia      Problem/Plan - 1:  ARF  - most likely 2/2  ATN 2/2 dehydration   - HRS vs cardio renal syndrome  in the DDX  - off lasix   s/p  albumin   c/w octreotide   after volume expansion pt show response in renal function and UO  low UO:        Problem/Plan - 2:   Acute hypoxic Respiratory failure   - likely in setting of aspiration PNA and fluid overload after resuscitation    - s/p intubation  on NC tolerating well   - f/u S/S   - c/w  abx.   - Prolactin 6.13  - droplet precaution        Problem/Plan - 3:   Shock   - bacteremia GPR-->on Unasyn   - hypovolemic shock in the differential   -complicated by ALONDRA and acidosis   -off pheny   - Flu positive --> c/w  Tamiflu renally dose  to complete 5 days          Problem/Plan - 4:  Metabolic acidosis.  - resolved   - most likely 2/2 dehydration and ARF          Problem/Plan - 5:  Afib.   - unknown if new or old  - was loaded with amiodarone and started on maintenance but later discontinued as patient already has transaminitis.   - Now on digoxin and lopressor   - Heparin drip dced due to BRBPR   - monitor CBC q12 due to BRBPR          Problem/Plan - 6: Transaminitis    Abd US shows cirrhosis   most likely early stages on cirrhosis since pt has normal platelets, normal bili and no ascites   most likely decompensated due to increase INR and FT  could also  be 2/2 hepatic congestion 2/2 HF  monitor LFT       Problem/Plan - 7:    Prophylactic measure.  Plan: continue Protonix and dvt px with hsq.       CRITICAL CARE TIME SPENT: 35 minutes

## 2018-01-21 NOTE — PROGRESS NOTE ADULT - SUBJECTIVE AND OBJECTIVE BOX
Patient extubated appears confused    ampicillin/sulbactam  IVPB 1.5 Gram(s) IV Intermittent every 12 hours  digoxin  Injectable 0.125 milliGRAM(s) IV Push daily  haloperidol    Injectable 2 milliGRAM(s) IV Push every 4 hours PRN  lactobacillus acidophilus 1 Tablet(s) Oral every 8 hours  metoprolol    tartrate Injectable 2.5 milliGRAM(s) IV Push every 4 hours  pantoprazole  Injectable 40 milliGRAM(s) IV Push two times a day                            13.3   10.7  )-----------( 171      ( 21 Jan 2018 15:29 )             42.8       Hemoglobin: 13.3 g/dL (01-21 @ 15:29)  Hemoglobin: 14.0 g/dL (01-21 @ 06:50)  Hemoglobin: 11.4 g/dL (01-20 @ 17:01)  Hemoglobin: 12.0 g/dL (01-20 @ 06:59)  Hemoglobin: 11.7 g/dL (01-19 @ 23:24)      01-21    156<H>  |  122<H>  |  67<H>  ----------------------------<  241<H>  4.3   |  24  |  2.25<H>    Ca    8.8      21 Jan 2018 15:29  Phos  3.5     01-21  Mg     2.5     01-21    TPro  6.5  /  Alb  3.3<L>  /  TBili  1.0  /  DBili  0.5<H>  /  AST  41<H>  /  ALT  70<H>  /  AlkPhos  51  01-21    Creatinine Trend: 2.25<--, 2.36<--, 2.56<--, 2.86<--, 4.56<--, 4.06<--    COAGS:           T(C): 35.6 (01-21-18 @ 15:35), Max: 37.2 (01-20-18 @ 21:00)  HR: 101 (01-21-18 @ 15:00) (79 - 119)  BP: 144/64 (01-21-18 @ 15:00) (105/86 - 192/115)  RR: 19 (01-21-18 @ 15:00) (12 - 28)  SpO2: 99% (01-21-18 @ 15:00) (90% - 99%)  Wt(kg): --    I&O's Summary    20 Jan 2018 07:01  -  21 Jan 2018 07:00  --------------------------------------------------------  IN: 235 mL / OUT: 1670 mL / NET: -1435 mL    21 Jan 2018 07:01  -  21 Jan 2018 16:25  --------------------------------------------------------  IN: 0 mL / OUT: 600 mL / NET: -600 mL            HEENT:   Normal oral mucosa,   Lymphatic: No obvious lymphadenopathy , no edema  Cardiovascular: Normal S1 S2, No JVD, 1/6 PEDRO murmur, Peripheral pulses palpable 2+ bilaterally  Respiratory: Coarse mechanical BS, normal effort 	  Gastrointestinal:  Soft, Non-tender, + BS	  Skin: No rashes,  No cyanosis, warm to touch  Musculoskeletal: unable to fully assess  Psychiatry:  Unable to asses Mood & affect     TELEMETRY: 	afib        ASSESSMENT/PLAN: 	89y Male ? cirrhosis found down by his superintendant,  admitted with respiratory failure new rapid afib, shock, found with severe LV dyfx  of unknown duration.    - Change to PO Dig and lopressor if taking PO  - Remainder of cardiac w/u pending clinical course        Donald Logan MD, FACC  [facility], [facility]  [address] [place] [address]  PHONE:  [phone]  BEEPER : [phone]

## 2018-01-21 NOTE — PROGRESS NOTE ADULT - ASSESSMENT
89 male from home, found on floor, +ALONDRA, dehydration, afib with RVR, encephalopathy was fluid resuscitated in ED, developed acute respiratory failure secondary to pulmonary edema intubated and admitted to ICU.     1. ALONDRA: R/O Hepato-renal syndrome: patient with liver cirrhosis and urine Na<5,on Albbumin plus octreotide , now renal function is improving with good U/O   - keep SBP>110  - Keep patient euvolemic and renal diet  - Avoid Nephrotoxic Meds/ Agents such as (NSAIDs, IV contrast, Aminoglycosides such as gentamicin, -Gadolinium contrast, Phosphate containing enemas, etc..)  - Adjust Medications according to eGFR  - f/u bmp daily    2. Hypernatremia.  -now worsening secondary to diuresis from improved gfr  -agree with d5w hydration and d/c albumin/sandostatin for now  -f/u Na level daily    3. Hyperkalemia.   - resolved   -secondary to alondra  -keep k >4 and <5    4. Hyperphosphatemia: sec to ALONDRA   -now with nl phos level  5.R/o CKD:r/o stage 3 as per pcp , secondary to htn?  -Keep patient euvolemic and renal diet  -Avoid Nephrotoxic Meds/ Agents such as (NSAIDs, IV contrast, Aminoglycosides such as gentamicin, -Gadolinium contrast, Phosphate containing enemas, etc..)  -Adjust Medications according to eGFR

## 2018-01-21 NOTE — PROGRESS NOTE ADULT - ATTENDING COMMENTS
89 male with unknown PMH was found down in his apartment, brought to the ED and found to be febrile with influenza, ALONDRA, dehydration, afib with RVR, encephalopathy, aspiration pneumonia and septic shock.  Also found to have CHFrEF/cardiomyopathy.   ALONDRA resolving.    Extubated 1/20.  Now slightly encephalopathic, likely encephalopathic.   Plan:  - tamiflu and abx  - metoprolol and digoxin for rate control, heparin drip held due to hematochezia  - OOB, PT eval, swallow eval

## 2018-01-21 NOTE — PROGRESS NOTE ADULT - SUBJECTIVE AND OBJECTIVE BOX
pt seen and examined.pts current chart reviewed and case discussed with resident covering.    SUBJECTIVE:  pt feels fine and denies cp,sob,gi or gu/uremic  symptons    REVIEW OF SYSTEMS:  CONSTITUTIONAL: No weakness, fevers or chills  EYES/ENT: No visual changes;  No vertigo or throat pain   NECK: No pain or stiffness  RESPIRATORY: No cough, wheezing, hemoptysis; No shortness of breath  CARDIOVASCULAR: No chest pain or palpitations  GASTROINTESTINAL: No abdominal or epigastric pain. No nausea, vomiting, or hematemesis; No diarrhea or constipation. No melena or hematochezia.  GENITOURINARY: No dysuria, frequency , hematuria, flank pain or nocturia  NEUROLOGICAL: No numbness or weakness  SKIN: No itching, burning, rashes, or lesions   All other review of systems is negative unless indicated above    Current meds:    ampicillin/sulbactam  IVPB 1.5 Gram(s) IV Intermittent every 12 hours  dextrose 5%. 1000 milliLiter(s) IV Continuous <Continuous>  digoxin  Injectable 0.125 milliGRAM(s) IV Push daily  haloperidol    Injectable 2 milliGRAM(s) IV Push every 4 hours PRN  lactobacillus acidophilus 1 Tablet(s) Oral every 8 hours  metoprolol    tartrate Injectable 2.5 milliGRAM(s) IV Push every 4 hours  pantoprazole  Injectable 40 milliGRAM(s) IV Push two times a day      Vital Signs    T(F): 96 (18 @ 15:35), Max: 98.9 (18 @ 21:00)  HR: 89 (18 @ 18:00) (79 - 119)  BP: 143/75 (18 @ 18:00) (105/86 - 192/115)  ABP: --  RR: 20 (18 @ 18:00) (12 - 28)  SpO2: 98% (18 @ 17:00) (90% - 99%)  Wt(kg): --  CVP(cm H2O): --  CO: --  PCWP: --    I and O's:     @ 07:01  -   @ 07:00  --------------------------------------------------------  IN:    dexmedetomidine Infusion: 208.3 mL    Enteral Tube Flush: 210 mL    Glucerna: 960 mL    heparin  Infusion.: 240 mL    phenylephrine   Infusion: 35.2 mL    Solution: 100 mL    Solution: 100 mL  Total IN: 1853.5 mL    OUT:    Indwelling Catheter - Urethral: 1575 mL  Total OUT: 1575 mL    Total NET: 278.5 mL       @ 07: @ 07:00  --------------------------------------------------------  IN:    heparin  Infusion.: 135 mL    Solution: 50 mL    Solution: 50 mL  Total IN: 235 mL    OUT:    Indwelling Catheter - Urethral: 1670 mL  Total OUT: 1670 mL    Total NET: -1435 mL       @ 07: @ 18:56  --------------------------------------------------------  IN:    dextrose 5%.: 210 mL  Total IN: 210 mL    OUT:    Indwelling Catheter - Urethral: 950 mL  Total OUT: 950 mL    Total NET: -740 mL        Daily     Daily Weight in k.6 (2018 07:00)    PHYSICAL EXAM:  Constitutional: well developed, well nourished  and in nad  HEENT: PERRLA,  no icteric sclera and mild pallor of conjunctiva noted  Neck: No JVD, thyromegaly or adenopathy  Respiratory: reduced air entry lower lungs with no rales, wheezing or rhonchi  Cardiovascular: S1 and S2 normally heard  Gastrointestinal: soft, nondistended, nontender and normal bowel sounds heard  Extremities: No peripheral edema or cyanosis  Neurological: A/O x 3, no focal deficits  : No flank or cva tenderness palpated.  Skin: No rashes      LABS:    CBC:                          13.3   10.7  )-----------( 171      ( 2018 15:29 )             42.8           BMP:        156<H>  |  122<H>  |  67<H>  ----------------------------<  241<H>  4.3   |  24  |  2.25<H>      153<H>  |  119<H>  |  71<H>  ----------------------------<  265<H>  4.6   |  24  |  2.36<H>      149<H>  |  116<H>  |  78<H>  ----------------------------<  305<H>  4.7   |  24  |  2.56<H>      145  |  113<H>  |  73<H>  ----------------------------<  231<H>  4.1   |  22  |  2.86<H>    Ca    8.8      2018 15:29  Ca    8.7      2018 06:50  Ca    8.4      2018 06:59  Ca    8.3<L>      2018 06:10  Phos  3.5       Phos  4.1       Phos  3.5       Mg     2.5       Mg     2.7       Mg     2.5         TPro  6.5  /  Alb  3.3<L>  /  TBili  1.0  /  DBili  0.5<H>  /  AST  41<H>  /  ALT  70<H>  /  AlkPhos  51    TPro  6.1  /  Alb  2.6<L>  /  TBili  0.7  /  DBili  x   /  AST  52<H>  /  ALT  96<H>  /  AlkPhos  51    TPro  6.2  /  Alb  1.9<L>  /  TBili  0.4  /  DBili  x   /  AST  163<H>  /  ALT  179<H>  /  AlkPhos  65            URINE STUDIES:        Sodium, Random Urine: 38 mmol/L ( @ 06:10)  Osmolality, Random Urine: 544 mos/kg (01-19 @ 06:10)  Potassium, Random Urine: 24 mmol/L ( @ 06:10)  Potassium, Random Urine: 19 mmol/L ( @ 09:41)  Osmolality, Random Urine: 523 mos/kg ( @ 09:41)  Sodium, Random Urine: <5 mmol/L (:41)  Osmolality, Random Urine: 424 mos/kg ( @ 18:10)  Creatinine, Random Urine: 117 mg/dL ( 18:10)  Sodium, Random Urine: 50 mmol/L ( @ 18:08)  Chloride, Random Urine: 49 mmol/L ( @ 18:08)  Potassium, Random Urine: 54 mmol/L ( @ 18:08)  Creatinine, Random Urine: 206 mg/dL ( @ 14:23)  Sodium, Random Urine: 31 mmol/L ( 14:23)  Osmolality, Random Urine: 417 mos/kg ( 14:23)                  RADIOLOGY & ADDITIONAL STUDIES: pt seen and examined.pts current chart reviewed and case discussed with resident covering.    SUBJECTIVE:  pt is awake, confused and in nad    REVIEW OF SYSTEMS:  Unobtainable  Current meds:    ampicillin/sulbactam  IVPB 1.5 Gram(s) IV Intermittent every 12 hours  dextrose 5%. 1000 milliLiter(s) IV Continuous <Continuous>  digoxin  Injectable 0.125 milliGRAM(s) IV Push daily  haloperidol    Injectable 2 milliGRAM(s) IV Push every 4 hours PRN  lactobacillus acidophilus 1 Tablet(s) Oral every 8 hours  metoprolol    tartrate Injectable 2.5 milliGRAM(s) IV Push every 4 hours  pantoprazole  Injectable 40 milliGRAM(s) IV Push two times a day      Vital Signs    T(F): 96 (18 @ 15:35), Max: 98.9 (18 @ 21:00)  HR: 89 (18 @ 18:00) (79 - 119)  BP: 143/75 (18 @ 18:00) (105/86 - 192/115)  ABP: --  RR: 20 (18 @ 18:00) (12 - 28)  SpO2: 98% (18 @ 17:00) (90% - 99%)  Wt(kg): --  CVP(cm H2O): --  CO: --  PCWP: --    I and O's:     @ 07:  -   @ 07:00  --------------------------------------------------------  IN:    dexmedetomidine Infusion: 208.3 mL    Enteral Tube Flush: 210 mL    Glucerna: 960 mL    heparin  Infusion.: 240 mL    phenylephrine   Infusion: 35.2 mL    Solution: 100 mL    Solution: 100 mL  Total IN: 1853.5 mL    OUT:    Indwelling Catheter - Urethral: 1575 mL  Total OUT: 1575 mL    Total NET: 278.5 mL       @ 07:01  -   @ 07:00  --------------------------------------------------------  IN:    heparin  Infusion.: 135 mL    Solution: 50 mL    Solution: 50 mL  Total IN: 235 mL    OUT:    Indwelling Catheter - Urethral: 1670 mL  Total OUT: 1670 mL    Total NET: -1435 mL       @ 07: @ 18:56  --------------------------------------------------------  IN:    dextrose 5%.: 210 mL  Total IN: 210 mL    OUT:    Indwelling Catheter - Urethral: 950 mL  Total OUT: 950 mL    Total NET: -740 mL        Daily     Daily Weight in k.6 (2018 07:00)    PHYSICAL EXAM:  Constitutional:well developed, well nourished  and in nad  Eyes: non icteric sclare seen and mild palor of the conj.noted  Neck :supple, no JVD  Respiratory: distant breath sounds with no rales or whezzing heard  Cardiovascular:S1, s2 present   Gastrointestinal: soft, non tender non distended and nl bs heard  Extremities: no cyanosis, edema or clubbing   Neurological: alert and oriented  Skin: No rashes    LABS:    CBC:                          13.3   10.7  )-----------( 171      ( 2018 15:29 )             42.8           BMP:        156<H>  |  122<H>  |  67<H>  ----------------------------<  241<H>  4.3   |  24  |  2.25<H>      153<H>  |  119<H>  |  71<H>  ----------------------------<  265<H>  4.6   |  24  |  2.36<H>      149<H>  |  116<H>  |  78<H>  ----------------------------<  305<H>  4.7   |  24  |  2.56<H>      145  |  113<H>  |  73<H>  ----------------------------<  231<H>  4.1   |  22  |  2.86<H>    Ca    8.8      2018 15:29  Ca    8.7      2018 06:50  Ca    8.4      2018 06:59  Ca    8.3<L>      2018 06:10  Phos  3.5       Phos  4.1       Phos  3.5       Mg     2.5       Mg     2.7       Mg     2.5         TPro  6.5  /  Alb  3.3<L>  /  TBili  1.0  /  DBili  0.5<H>  /  AST  41<H>  /  ALT  70<H>  /  AlkPhos  51    TPro  6.1  /  Alb  2.6<L>  /  TBili  0.7  /  DBili  x   /  AST  52<H>  /  ALT  96<H>  /  AlkPhos  51    TPro  6.2  /  Alb  1.9<L>  /  TBili  0.4  /  DBili  x   /  AST  163<H>  /  ALT  179<H>  /  AlkPhos  65            URINE STUDIES:        Sodium, Random Urine: 38 mmol/L ( @ 06:10)  Osmolality, Random Urine: 544 mos/kg ( @ 06:10)  Potassium, Random Urine: 24 mmol/L ( @ 06:10)  Potassium, Random Urine: 19 mmol/L ( @ 09:41)  Osmolality, Random Urine: 523 mos/kg ( @ 09:41)  Sodium, Random Urine: <5 mmol/L ( @ 09:41)  Osmolality, Random Urine: 424 mos/kg ( @ 18:10)  Creatinine, Random Urine: 117 mg/dL ( @ 18:10)  Sodium, Random Urine: 50 mmol/L ( @ 18:08)  Chloride, Random Urine: 49 mmol/L ( @ 18:08)  Potassium, Random Urine: 54 mmol/L ( @ 18:08)  Creatinine, Random Urine: 206 mg/dL ( @ 14:23)  Sodium, Random Urine: 31 mmol/L ( @ :23)  Osmolality, Random Urine: 417 mos/kg ( @ :23)

## 2018-01-22 NOTE — PROGRESS NOTE ADULT - SUBJECTIVE AND OBJECTIVE BOX
[ ]INTERVAL HPI/ OVERNIGHT EVENTS:  no events       PRESSORS: [  ] YES [ x] NO  WHICH:     ANTIBIOTICS:  Unasyn               DATE STARTED: 1/18    ampicillin/sulbactam  IVPB 1.5 Gram(s) IV Intermittent every 12 hours  dextrose 5%. 1000 milliLiter(s) IV Continuous <Continuous>  digoxin  Injectable 0.125 milliGRAM(s) IV Push daily  haloperidol    Injectable 2 milliGRAM(s) IV Push every 4 hours PRN  lactobacillus acidophilus 1 Tablet(s) Oral every 8 hours  metoprolol    tartrate Injectable 2.5 milliGRAM(s) IV Push every 4 hours  pantoprazole  Injectable 40 milliGRAM(s) IV Push two times a day  potassium phosphate IVPB 15 milliMole(s) IV Intermittent once      CENTRAL LINE: [x ] YES [ ] NO  LOCATION: [facility]    DATE INSERTED: 1/16  REMOVE: [ ] YES [ ] NO  EXPLAIN:    CAT: [x ] YES [ ] NO    DATE INSERTED: 1/19  REMOVE:  [ ] YES [ ] NO  EXPLAIN:    PMH -reviewed admission note, no change since admission  PAST MEDICAL & SURGICAL HISTORY:      T(C): 36.3 (01-22-18 @ 08:00), Max: 36.3 (01-22-18 @ 08:00)  HR: 109 (01-22-18 @ 09:00)  BP: 125/69 (01-22-18 @ 09:00)  RR: 25 (01-22-18 @ 09:00)  SpO2: 100% (01-22-18 @ 09:00)  Wt(kg): --      01-21 @ 07:01  -  01-22 @ 07:00  --------------------------------------------------------  IN: 1460 mL / OUT: 1720 mL / NET: -260 mL      PHYSICAL EXAM:    GENERAL: NAD  HEENT: dry mucosa, PERRLA  NECK: supple neck, no JVD  CVS: tachycardic, no RMG  CHEST/LUNG: CTAx2, no wheezing, no rales, no rhonchi  ABDOMEN:  soft, non tender. no distention, no rigidity   EXTREMITIES:   no edema, no cyanosis   SKIN:  no rashes, no blisters  Neuro: awake and alert     LABS:  CBC Full  -  ( 22 Jan 2018 06:47 )  WBC Count : 9.4 K/uL  Hemoglobin : 12.9 g/dL  Hematocrit : 41.8 %  Platelet Count - Automated : 158 K/uL  Mean Cell Volume : 101.1 fl  Mean Cell Hemoglobin : 31.2 pg  Mean Cell Hemoglobin Concentration : 30.9 gm/dL  Auto Neutrophil # : x  Auto Lymphocyte # : x  Auto Monocyte # : x  Auto Eosinophil # : x  Auto Basophil # : x  Auto Neutrophil % : x  Auto Lymphocyte % : x  Auto Monocyte % : x  Auto Eosinophil % : x  Auto Basophil % : x    01-22    154<H>  |  120<H>  |  62<H>  ----------------------------<  286<H>  4.3   |  24  |  1.97<H>    Ca    8.6      22 Jan 2018 06:47  Phos  2.4     01-22  Mg     2.3     01-22    TPro  6.5  /  Alb  3.3<L>  /  TBili  1.0  /  DBili  0.5<H>  /  AST  41<H>  /  ALT  70<H>  /  AlkPhos  51  01-21            RADIOLOGY & ADDITIONAL STUDIES REVIEWED:      84 M with no known PMH is BIBEMS after being found on floor in respiratory distress after 2 days, and with significant metabolic acidosis from ARF with oliguria from dehydration,  lactic acidosis and fevers. Pt admitted to MICU for Acute Hypoxic  Respiratory Failure and shock in setting of likely sepsis vs hypovolemia      Problem/Plan - 1:  ARF  - most likely 2/2  ATN 2/2 dehydration   - off lasix   s/p  albumin and  octreotide   after volume expansion pt show response in renal function and UO  low UO: 60-70       Problem/Plan - 2:   Acute hypoxic Respiratory failure   - likely in setting of aspiration PNA and fluid overload after resuscitation    - s/p intubation  on NC tolerating well   - f/u S/S   - c/w  abx since prolactin 6.13  -finish Tamiflu   - off droplet precaution        Problem/Plan - 3:   Shock   - bacteremia w/ Gemella -->on Unasyn   - hypovolemic shock was also in  differential   -off pressors            Problem/Plan - 4:  Metabolic acidosis.  - resolved   - most likely 2/2 dehydration and ARF          Problem/Plan - 5:  Afib.   - unknown if new or old  - was loaded with amiodarone and started on maintenance but later discontinued as patient already has transaminitis.   - Now on digoxin and lopressor   - Heparin drip dced due to BRBPR   - monitor CBC q12 due to BRBPR          Problem/Plan - 6: Transaminitis    Abd US shows cirrhosis   most likely early stages on cirrhosis since pt has normal platelets, normal bili and no ascites   most likely decompensated due to increase INR and FT  could also  be 2/2 hepatic congestion 2/2 HF  monitor LFT       Problem/Plan - 7:    Prophylactic measure.  Plan: continue Protonix and dvt px with hsq.         CRITICAL CARE TIME SPENT: 35 minutes [ ]INTERVAL HPI/ OVERNIGHT EVENTS:  no events       PRESSORS: [  ] YES [ x] NO  WHICH:     ANTIBIOTICS:  Unasyn               DATE STARTED: 1/18    ampicillin/sulbactam  IVPB 1.5 Gram(s) IV Intermittent every 12 hours  dextrose 5%. 1000 milliLiter(s) IV Continuous <Continuous>  digoxin  Injectable 0.125 milliGRAM(s) IV Push daily  haloperidol    Injectable 2 milliGRAM(s) IV Push every 4 hours PRN  lactobacillus acidophilus 1 Tablet(s) Oral every 8 hours  metoprolol    tartrate Injectable 2.5 milliGRAM(s) IV Push every 4 hours  pantoprazole  Injectable 40 milliGRAM(s) IV Push two times a day  potassium phosphate IVPB 15 milliMole(s) IV Intermittent once      CENTRAL LINE: [x ] YES [ ] NO  LOCATION: [facility]    DATE INSERTED: 1/16  REMOVE: [ ] YES [ ] NO  EXPLAIN:    CAT: [x ] YES [ ] NO    DATE INSERTED: 1/19  REMOVE:  [ ] YES [ ] NO  EXPLAIN:    PMH -reviewed admission note, no change since admission  PAST MEDICAL & SURGICAL HISTORY:      T(C): 36.3 (01-22-18 @ 08:00), Max: 36.3 (01-22-18 @ 08:00)  HR: 109 (01-22-18 @ 09:00)  BP: 125/69 (01-22-18 @ 09:00)  RR: 25 (01-22-18 @ 09:00)  SpO2: 100% (01-22-18 @ 09:00)  Wt(kg): --      01-21 @ 07:01  -  01-22 @ 07:00  --------------------------------------------------------  IN: 1460 mL / OUT: 1720 mL / NET: -260 mL      PHYSICAL EXAM:    GENERAL: NAD  HEENT: dry mucosa, PERRLA  NECK: supple neck, no JVD  CVS: tachycardic, no RMG  CHEST/LUNG: CTAx2, no wheezing, no rales, no rhonchi  ABDOMEN:  soft, non tender. no distention, no rigidity   EXTREMITIES:   no edema, no cyanosis   SKIN:  no rashes, no blisters  Neuro: awake and alert     LABS:  CBC Full  -  ( 22 Jan 2018 06:47 )  WBC Count : 9.4 K/uL  Hemoglobin : 12.9 g/dL  Hematocrit : 41.8 %  Platelet Count - Automated : 158 K/uL  Mean Cell Volume : 101.1 fl  Mean Cell Hemoglobin : 31.2 pg  Mean Cell Hemoglobin Concentration : 30.9 gm/dL  Auto Neutrophil # : x  Auto Lymphocyte # : x  Auto Monocyte # : x  Auto Eosinophil # : x  Auto Basophil # : x  Auto Neutrophil % : x  Auto Lymphocyte % : x  Auto Monocyte % : x  Auto Eosinophil % : x  Auto Basophil % : x    01-22    154<H>  |  120<H>  |  62<H>  ----------------------------<  286<H>  4.3   |  24  |  1.97<H>    Ca    8.6      22 Jan 2018 06:47  Phos  2.4     01-22  Mg     2.3     01-22    TPro  6.5  /  Alb  3.3<L>  /  TBili  1.0  /  DBili  0.5<H>  /  AST  41<H>  /  ALT  70<H>  /  AlkPhos  51  01-21            RADIOLOGY & ADDITIONAL STUDIES REVIEWED:      84 M with no known PMH is BIBEMS after being found on floor in respiratory distress after 2 days, and with significant metabolic acidosis from ARF with oliguria from dehydration,  lactic acidosis and fevers. Pt admitted to MICU for Acute Hypoxic  Respiratory Failure and shock in setting of likely sepsis vs hypovolemia      Problem/Plan - 1:  ARF  - most likely 2/2  ATN 2/2 dehydration   - off lasix   s/p  albumin and  octreotide   after volume expansion pt show response in renal function and UO  low UO: 60-70       Problem/Plan - 2:   Acute hypoxic Respiratory failure   - likely in setting of aspiration PNA and fluid overload after resuscitation    - s/p intubation  on NC tolerating well   - f/u S/S   - c/w  abx since prolactin 6.13  -finish Tamiflu   - off droplet precaution        Problem/Plan - 3:   Shock   - bacteremia w/ Gemella -->on Unasyn   - hypovolemic shock was also in  differential   -off pressors            Problem/Plan - 4:  Metabolic acidosis.  - resolved   - most likely 2/2 dehydration and ARF          Problem/Plan - 5:  Afib.   - unknown if new or old  - was loaded with amiodarone and started on maintenance but later discontinued as patient already has transaminitis.   - Now on digoxin and lopressor   - Heparin drip dced due to BRBPR   - monitor CBC qD due to BRBPR   - Hg stable         Problem/Plan - 6: Transaminitis    Abd US shows cirrhosis   most likely early stages on cirrhosis since pt has normal platelets, normal bili and no ascites   most likely decompensated due to increase INR and FT  could also  be 2/2 hepatic congestion 2/2 HF  monitor LFT       Problem/Plan - 7:    Prophylactic measure.  Plan: continue Protonix and dvt px with hsq.         CRITICAL CARE TIME SPENT: 35 minutes [ ]INTERVAL HPI/ OVERNIGHT EVENTS:  no events       PRESSORS: [  ] YES [ x] NO  WHICH:     ANTIBIOTICS:  Unasyn               DATE STARTED: 1/18    ampicillin/sulbactam  IVPB 1.5 Gram(s) IV Intermittent every 12 hours  dextrose 5%. 1000 milliLiter(s) IV Continuous <Continuous>  digoxin  Injectable 0.125 milliGRAM(s) IV Push daily  haloperidol    Injectable 2 milliGRAM(s) IV Push every 4 hours PRN  lactobacillus acidophilus 1 Tablet(s) Oral every 8 hours  metoprolol    tartrate Injectable 2.5 milliGRAM(s) IV Push every 4 hours  pantoprazole  Injectable 40 milliGRAM(s) IV Push two times a day  potassium phosphate IVPB 15 milliMole(s) IV Intermittent once      CENTRAL LINE: [x ] YES [ ] NO  LOCATION: [facility]    DATE INSERTED: 1/16  REMOVE: [ ] YES [ ] NO  EXPLAIN:    CAT: [x ] YES [ ] NO    DATE INSERTED: 1/19  REMOVE:  [ ] YES [ ] NO  EXPLAIN:    PMH -reviewed admission note, no change since admission  PAST MEDICAL & SURGICAL HISTORY:      T(C): 36.3 (01-22-18 @ 08:00), Max: 36.3 (01-22-18 @ 08:00)  HR: 109 (01-22-18 @ 09:00)  BP: 125/69 (01-22-18 @ 09:00)  RR: 25 (01-22-18 @ 09:00)  SpO2: 100% (01-22-18 @ 09:00)  Wt(kg): --      01-21 @ 07:01  -  01-22 @ 07:00  --------------------------------------------------------  IN: 1460 mL / OUT: 1720 mL / NET: -260 mL      PHYSICAL EXAM:    GENERAL: NAD  HEENT: dry mucosa, PERRLA  NECK: supple neck, no JVD  CVS: tachycardic, no RMG  CHEST/LUNG: CTAx2, no wheezing, no rales, no rhonchi  ABDOMEN:  soft, non tender. no distention, no rigidity   EXTREMITIES:   no edema, no cyanosis   SKIN:  no rashes, no blisters  Neuro: awake and alert     LABS:  CBC Full  -  ( 22 Jan 2018 06:47 )  WBC Count : 9.4 K/uL  Hemoglobin : 12.9 g/dL  Hematocrit : 41.8 %  Platelet Count - Automated : 158 K/uL  Mean Cell Volume : 101.1 fl  Mean Cell Hemoglobin : 31.2 pg  Mean Cell Hemoglobin Concentration : 30.9 gm/dL  Auto Neutrophil # : x  Auto Lymphocyte # : x  Auto Monocyte # : x  Auto Eosinophil # : x  Auto Basophil # : x  Auto Neutrophil % : x  Auto Lymphocyte % : x  Auto Monocyte % : x  Auto Eosinophil % : x  Auto Basophil % : x    01-22    154<H>  |  120<H>  |  62<H>  ----------------------------<  286<H>  4.3   |  24  |  1.97<H>    Ca    8.6      22 Jan 2018 06:47  Phos  2.4     01-22  Mg     2.3     01-22    TPro  6.5  /  Alb  3.3<L>  /  TBili  1.0  /  DBili  0.5<H>  /  AST  41<H>  /  ALT  70<H>  /  AlkPhos  51  01-21            RADIOLOGY & ADDITIONAL STUDIES REVIEWED:      84 M with  PMH  DM, HTN, cirrhosis is BIBEMS after being found on floor in respiratory distress after 2 days, and with significant metabolic acidosis from ARF with oliguria from dehydration,  lactic acidosis and fevers. Pt admitted to MICU for Acute Hypoxic  Respiratory Failure  and shock in setting of likely sepsis vs hypovolemia. Thought of HRS but pt responded to fluid resuscitation with albumin and Octreotide. Pt started having good UO and kidney function started to improved.           Problem/Plan - 1:   Acute hypoxic Respiratory failure   - likely in setting of aspiration PNA, flu  and fluid overload after resuscitation    - s/p intubation  on NC tolerating well   - f/u S/S   - c/w  abx since prolactin 6.13  -finish Tamiflu   - off droplet precaution       Problem/Plan - 2:  ARF  - most likely 2/2  ATN 2/2 dehydration   - off lasix   s/p  albumin and  octreotide   after volume expansion pt show response in renal function and UO  low UO: 60-70         Problem/Plan - 3:   Shock   - bacteremia w/ Gemella -->on Unasyn   - hypovolemic shock was also in  differential   -off pressors with good BP          Problem/Plan - 4:  Metabolic acidosis.  - resolved   - most likely 2/2  ARF and sepsis          Problem/Plan - 5:  Afib.   - unknown if new or old  - was loaded with amiodarone and started on maintenance but later discontinued as patient already has transaminitis.   - Now on digoxin and lopressor   - Heparin drip dced due to BRBPR   - monitor CBC qD due to BRBPR   - Hg stable         Problem/Plan - 6: Transaminitis    Abd US shows cirrhosis   most likely early stages on cirrhosis since pt has normal platelets, normal bili and no ascites   most likely decompensated due to increase INR and FT  could also  be 2/2 hepatic congestion 2/2 HF  monitor LFT       Problem/Plan - 7:    Prophylactic measure.  Plan: continue Protonix and dvt px with hsq.         CRITICAL CARE TIME SPENT: 35 minutes [ ]INTERVAL HPI/ OVERNIGHT EVENTS:  no events       PRESSORS: [  ] YES [ x] NO  WHICH:     ANTIBIOTICS:  Unasyn               DATE STARTED: 1/18    ampicillin/sulbactam  IVPB 1.5 Gram(s) IV Intermittent every 12 hours  dextrose 5%. 1000 milliLiter(s) IV Continuous <Continuous>  digoxin  Injectable 0.125 milliGRAM(s) IV Push daily  haloperidol    Injectable 2 milliGRAM(s) IV Push every 4 hours PRN  lactobacillus acidophilus 1 Tablet(s) Oral every 8 hours  metoprolol    tartrate Injectable 2.5 milliGRAM(s) IV Push every 4 hours  pantoprazole  Injectable 40 milliGRAM(s) IV Push two times a day  potassium phosphate IVPB 15 milliMole(s) IV Intermittent once      CENTRAL LINE: [x ] YES [ ] NO  LOCATION: [facility]    DATE INSERTED: 1/16  REMOVE: [ ] YES [ ] NO  EXPLAIN:    CAT: [x ] YES [ ] NO    DATE INSERTED: 1/19  REMOVE:  [ ] YES [ ] NO  EXPLAIN:    PMH -reviewed admission note, no change since admission  PAST MEDICAL & SURGICAL HISTORY:      T(C): 36.3 (01-22-18 @ 08:00), Max: 36.3 (01-22-18 @ 08:00)  HR: 109 (01-22-18 @ 09:00)  BP: 125/69 (01-22-18 @ 09:00)  RR: 25 (01-22-18 @ 09:00)  SpO2: 100% (01-22-18 @ 09:00)  Wt(kg): --      01-21 @ 07:01  -  01-22 @ 07:00  --------------------------------------------------------  IN: 1460 mL / OUT: 1720 mL / NET: -260 mL      PHYSICAL EXAM:    GENERAL: NAD  HEENT: dry mucosa, PERRLA  NECK: supple neck, no JVD  CVS: tachycardic, no RMG  CHEST/LUNG: CTAx2, no wheezing, no rales, no rhonchi  ABDOMEN:  soft, non tender. no distention, no rigidity   EXTREMITIES:   no edema, no cyanosis   SKIN:  no rashes, no blisters  Neuro: awake and alert     LABS:  CBC Full  -  ( 22 Jan 2018 06:47 )  WBC Count : 9.4 K/uL  Hemoglobin : 12.9 g/dL  Hematocrit : 41.8 %  Platelet Count - Automated : 158 K/uL  Mean Cell Volume : 101.1 fl  Mean Cell Hemoglobin : 31.2 pg  Mean Cell Hemoglobin Concentration : 30.9 gm/dL  Auto Neutrophil # : x  Auto Lymphocyte # : x  Auto Monocyte # : x  Auto Eosinophil # : x  Auto Basophil # : x  Auto Neutrophil % : x  Auto Lymphocyte % : x  Auto Monocyte % : x  Auto Eosinophil % : x  Auto Basophil % : x    01-22    154<H>  |  120<H>  |  62<H>  ----------------------------<  286<H>  4.3   |  24  |  1.97<H>    Ca    8.6      22 Jan 2018 06:47  Phos  2.4     01-22  Mg     2.3     01-22    TPro  6.5  /  Alb  3.3<L>  /  TBili  1.0  /  DBili  0.5<H>  /  AST  41<H>  /  ALT  70<H>  /  AlkPhos  51  01-21            RADIOLOGY & ADDITIONAL STUDIES REVIEWED:      84 M with  PMH  DM, HTN, cirrhosis is BIBEMS after being found on floor in respiratory distress after 2 days, and with significant metabolic acidosis from ARF with oliguria from dehydration,  lactic acidosis and fevers. Pt admitted to MICU for Acute Hypoxic  Respiratory Failure  and shock in setting of likely sepsis vs hypovolemia. Thought of HRS but pt responded to fluid resuscitation with albumin and Octreotide. Pt started having good UO and kidney function started to improved.           Problem/Plan - 1:   Acute hypoxic Respiratory failure   - likely in setting of aspiration PNA, flu  and fluid overload after resuscitation    - s/p intubation  on NC tolerating well   - f/u S/S   - c/w  abx since prolactin 6.13  -finish Tamiflu   - off droplet precaution       Problem/Plan - 2:  ARF  - most likely 2/2  ATN 2/2 dehydration   - off lasix   s/p  albumin and  octreotide   after volume expansion pt show response in renal function and UO  low UO: 60-70         Problem/Plan - 3:   Shock   - bacteremia w/ Gemella -->on Unasyn   - hypovolemic shock was also in  differential   -off pressors with good BP          Problem/Plan - 4:  Metabolic acidosis.  - resolved   - most likely 2/2  ARF and sepsis          Problem/Plan - 5:  Afib.   - unknown if new or old  - was loaded with amiodarone and started on maintenance but later discontinued as patient already has transaminitis.   - Now on digoxin and lopressor   - Heparin drip dced due to BRBPR   - monitor CBC qD due to BRBPR   - Hg stable         Problem/Plan - 6: Transaminitis    Abd US shows cirrhosis   most likely early stages on cirrhosis since pt has normal platelets, normal bili and no ascites   most likely decompensated due to increase INR and FT  could also  be 2/2 hepatic congestion 2/2 HF  monitor LFT     Problem/Plan - 7: Hypernatremia    154 today   - on D5 for now  monitor BMP @ 5 pm        Problem/Plan - 8:    Prophylactic measure.  Plan: continue Protonix and dvt px with hsq.         CRITICAL CARE TIME SPENT: 35 minutes [ ]INTERVAL HPI/ OVERNIGHT EVENTS:  no events       PRESSORS: [  ] YES [ x] NO  WHICH:     ANTIBIOTICS:  Unasyn               DATE STARTED: 1/18    ampicillin/sulbactam  IVPB 1.5 Gram(s) IV Intermittent every 12 hours  dextrose 5%. 1000 milliLiter(s) IV Continuous <Continuous>  digoxin  Injectable 0.125 milliGRAM(s) IV Push daily  haloperidol    Injectable 2 milliGRAM(s) IV Push every 4 hours PRN  lactobacillus acidophilus 1 Tablet(s) Oral every 8 hours  metoprolol    tartrate Injectable 2.5 milliGRAM(s) IV Push every 4 hours  pantoprazole  Injectable 40 milliGRAM(s) IV Push two times a day  potassium phosphate IVPB 15 milliMole(s) IV Intermittent once      CENTRAL LINE: [x ] YES [ ] NO  LOCATION: [facility]    DATE INSERTED: 1/16  REMOVE: [ ] YES [ ] NO  EXPLAIN:    CAT: [x ] YES [ ] NO    DATE INSERTED: 1/19  REMOVE:  [ ] YES [ ] NO  EXPLAIN:    PMH -reviewed admission note, no change since admission  PAST MEDICAL & SURGICAL HISTORY:      T(C): 36.3 (01-22-18 @ 08:00), Max: 36.3 (01-22-18 @ 08:00)  HR: 109 (01-22-18 @ 09:00)  BP: 125/69 (01-22-18 @ 09:00)  RR: 25 (01-22-18 @ 09:00)  SpO2: 100% (01-22-18 @ 09:00)  Wt(kg): --      01-21 @ 07:01  -  01-22 @ 07:00  --------------------------------------------------------  IN: 1460 mL / OUT: 1720 mL / NET: -260 mL      PHYSICAL EXAM:    GENERAL: NAD  HEENT: dry mucosa, PERRLA  NECK: supple neck, no JVD  CVS: tachycardic, no RMG  CHEST/LUNG: CTAx2, no wheezing, no rales, no rhonchi  ABDOMEN:  soft, non tender. no distention, no rigidity   EXTREMITIES:   no edema, no cyanosis   SKIN:  no rashes, no blisters  Neuro: awake and alert     LABS:  CBC Full  -  ( 22 Jan 2018 06:47 )  WBC Count : 9.4 K/uL  Hemoglobin : 12.9 g/dL  Hematocrit : 41.8 %  Platelet Count - Automated : 158 K/uL  Mean Cell Volume : 101.1 fl  Mean Cell Hemoglobin : 31.2 pg  Mean Cell Hemoglobin Concentration : 30.9 gm/dL  Auto Neutrophil # : x  Auto Lymphocyte # : x  Auto Monocyte # : x  Auto Eosinophil # : x  Auto Basophil # : x  Auto Neutrophil % : x  Auto Lymphocyte % : x  Auto Monocyte % : x  Auto Eosinophil % : x  Auto Basophil % : x    01-22    154<H>  |  120<H>  |  62<H>  ----------------------------<  286<H>  4.3   |  24  |  1.97<H>    Ca    8.6      22 Jan 2018 06:47  Phos  2.4     01-22  Mg     2.3     01-22    TPro  6.5  /  Alb  3.3<L>  /  TBili  1.0  /  DBili  0.5<H>  /  AST  41<H>  /  ALT  70<H>  /  AlkPhos  51  01-21            RADIOLOGY & ADDITIONAL STUDIES REVIEWED:      84 M with  PMH  DM, HTN, cirrhosis is BIBEMS after being found on floor in respiratory distress after 2 days, and with significant metabolic acidosis from ARF with oliguria from dehydration,  lactic acidosis and fevers. Pt admitted to MICU for Acute Hypoxic  Respiratory Failure  and shock in setting of likely sepsis vs hypovolemia. Thoughts of HRS but pt responded to fluid resuscitation with albumin and Octreotide. Pt started having good UO and kidney function started to improved.           Problem/Plan - 1:   Acute hypoxic Respiratory failure   - likely in setting of aspiration PNA, flu  and fluid overload after resuscitation    - s/p intubation  on NC tolerating well   - f/u S/S   - c/w  abx since prolactin 6.13  -finish Tamiflu   - off droplet precaution       Problem/Plan - 2:  ARF  - most likely 2/2  ATN 2/2 dehydration   - off lasix   s/p  albumin and  octreotide   after volume expansion pt show response in renal function and UO  low UO: 60-70         Problem/Plan - 3:   Shock   - bacteremia w/ Gemella -->on Unasyn   - hypovolemic shock was also in  differential   -off pressors with good BP          Problem/Plan - 4:  Metabolic acidosis.  - resolved   - most likely 2/2  ARF and sepsis          Problem/Plan - 5:  Afib.   - unknown if new or old  - was loaded with amiodarone and started on maintenance but later discontinued as patient already has transaminitis.   - Now on digoxin and lopressor   - Heparin drip dced due to BRBPR   - monitor CBC qD due to BRBPR   - Hg stable         Problem/Plan - 6: Transaminitis    Abd US shows cirrhosis   most likely early stages on cirrhosis since pt has normal platelets, normal bili and no ascites   most likely decompensated due to increase INR and FT  could also  be 2/2 hepatic congestion 2/2 HF  monitor LFT     Problem/Plan - 7: Hypernatremia    154 today   - on D5 for now  monitor BMP @ 5 pm        Problem/Plan - 8:    Prophylactic measure.  Plan: continue Protonix and dvt px with hsq.         CRITICAL CARE TIME SPENT: 35 minutes [ ]INTERVAL HPI/ OVERNIGHT EVENTS:  no events       PRESSORS: [  ] YES [ x] NO  WHICH:     ANTIBIOTICS:  Unasyn               DATE STARTED: 1/18    ampicillin/sulbactam  IVPB 1.5 Gram(s) IV Intermittent every 12 hours  dextrose 5%. 1000 milliLiter(s) IV Continuous <Continuous>  digoxin  Injectable 0.125 milliGRAM(s) IV Push daily  haloperidol    Injectable 2 milliGRAM(s) IV Push every 4 hours PRN  lactobacillus acidophilus 1 Tablet(s) Oral every 8 hours  metoprolol    tartrate Injectable 2.5 milliGRAM(s) IV Push every 4 hours  pantoprazole  Injectable 40 milliGRAM(s) IV Push two times a day  potassium phosphate IVPB 15 milliMole(s) IV Intermittent once      CENTRAL LINE: [x ] YES [ ] NO  LOCATION: [facility]    DATE INSERTED: 1/16  REMOVE: [ ] YES [ ] NO  EXPLAIN:    CAT: [x ] YES [ ] NO    DATE INSERTED: 1/19  REMOVE:  [ ] YES [ ] NO  EXPLAIN:    PMH -reviewed admission note, no change since admission  PAST MEDICAL & SURGICAL HISTORY:      T(C): 36.3 (01-22-18 @ 08:00), Max: 36.3 (01-22-18 @ 08:00)  HR: 109 (01-22-18 @ 09:00)  BP: 125/69 (01-22-18 @ 09:00)  RR: 25 (01-22-18 @ 09:00)  SpO2: 100% (01-22-18 @ 09:00)  Wt(kg): --      01-21 @ 07:01  -  01-22 @ 07:00  --------------------------------------------------------  IN: 1460 mL / OUT: 1720 mL / NET: -260 mL      PHYSICAL EXAM:    GENERAL: NAD  HEENT: dry mucosa, PERRLA  NECK: supple neck, no JVD  CVS: tachycardic, no RMG  CHEST/LUNG: CTAx2, no wheezing, no rales, no rhonchi  ABDOMEN:  soft, non tender. no distention, no rigidity   EXTREMITIES:   no edema, no cyanosis   SKIN:  no rashes, no blisters  Neuro: awake and alert     LABS:  CBC Full  -  ( 22 Jan 2018 06:47 )  WBC Count : 9.4 K/uL  Hemoglobin : 12.9 g/dL  Hematocrit : 41.8 %  Platelet Count - Automated : 158 K/uL  Mean Cell Volume : 101.1 fl  Mean Cell Hemoglobin : 31.2 pg  Mean Cell Hemoglobin Concentration : 30.9 gm/dL  Auto Neutrophil # : x  Auto Lymphocyte # : x  Auto Monocyte # : x  Auto Eosinophil # : x  Auto Basophil # : x  Auto Neutrophil % : x  Auto Lymphocyte % : x  Auto Monocyte % : x  Auto Eosinophil % : x  Auto Basophil % : x    01-22    154<H>  |  120<H>  |  62<H>  ----------------------------<  286<H>  4.3   |  24  |  1.97<H>    Ca    8.6      22 Jan 2018 06:47  Phos  2.4     01-22  Mg     2.3     01-22    TPro  6.5  /  Alb  3.3<L>  /  TBili  1.0  /  DBili  0.5<H>  /  AST  41<H>  /  ALT  70<H>  /  AlkPhos  51  01-21            RADIOLOGY & ADDITIONAL STUDIES REVIEWED:      84 M with  PMH  DM, HTN, cirrhosis is BIBEMS after being found on floor in respiratory distress after 2 days, and with significant metabolic acidosis from ARF with oliguria from dehydration,  lactic acidosis and fevers. Pt admitted to MICU for Acute Hypoxic  Respiratory Failure  and shock in setting of likely sepsis vs hypovolemia. Thoughts of HRS but pt responded to fluid resuscitation with albumin and Octreotide. Pt started having good UO and kidney function started to improved.           Problem/Plan - 1:   Acute hypoxic Respiratory failure   - likely in setting of aspiration PNA, flu  and fluid overload after resuscitation    - s/p intubation  on NC tolerating well   - f/u S/S   - c/w  abx since prolactin 6.13  -finish Tamiflu   - off droplet precaution       Problem/Plan - 2:  ARF  - most likely 2/2  ATN 2/2 dehydration   - off lasix   s/p  albumin and  octreotide   after volume expansion pt show response in renal function and UO  low UO: 60-70         Problem/Plan - 3:   Shock   - bacteremia w/ Gemella -->on Unasyn   - hypovolemic shock was also in  differential   -off pressors with good BP          Problem/Plan - 4:  Metabolic acidosis.  - resolved   - most likely 2/2  ARF and sepsis          Problem/Plan - 5:  Afib.   - unknown if new or old  - was loaded with amiodarone and started on maintenance but later discontinued as patient already has transaminitis.   - Now on digoxin and lopressor   - Heparin drip dced due to BRBPR but restarted today again   - monitor CBC qD  - Hg stable         Problem/Plan - 6: Transaminitis    Abd US shows cirrhosis   most likely early stages on cirrhosis since pt has normal platelets, normal bili and no ascites   most likely decompensated due to increase INR and FT  could also  be 2/2 hepatic congestion 2/2 HF  monitor LFT     Problem/Plan - 7: Hypernatremia    154 today   - on D5 for now  monitor BMP @ 5 pm        Problem/Plan - 8:    Prophylactic measure.  Plan: continue Protonix and dvt px with hsq.         CRITICAL CARE TIME SPENT: 35 minutes

## 2018-01-22 NOTE — PROGRESS NOTE ADULT - SUBJECTIVE AND OBJECTIVE BOX
Pt is confused    ampicillin/sulbactam  IVPB 1.5 Gram(s) IV Intermittent every 12 hours  dextrose 5%. 1000 milliLiter(s) IV Continuous <Continuous>  digoxin  Injectable 0.125 milliGRAM(s) IV Push daily  haloperidol    Injectable 2 milliGRAM(s) IV Push every 4 hours PRN  heparin  Infusion.  Unit(s)/Hr IV Continuous <Continuous>  insulin lispro (HumaLOG) corrective regimen sliding scale   SubCutaneous every 6 hours  lactobacillus acidophilus 1 Tablet(s) Oral every 8 hours  metoprolol    tartrate Injectable 2.5 milliGRAM(s) IV Push every 4 hours  pantoprazole  Injectable 40 milliGRAM(s) IV Push two times a day                            12.9   9.4   )-----------( 158      ( 22 Jan 2018 06:47 )             41.8       Hemoglobin: 12.9 g/dL (01-22 @ 06:47)  Hemoglobin: 13.5 g/dL (01-21 @ 21:28)  Hemoglobin: 13.3 g/dL (01-21 @ 15:29)  Hemoglobin: 14.0 g/dL (01-21 @ 06:50)  Hemoglobin: 11.4 g/dL (01-20 @ 17:01)      01-22    154<H>  |  120<H>  |  62<H>  ----------------------------<  286<H>  4.3   |  24  |  1.97<H>    Ca    8.6      22 Jan 2018 06:47  Phos  2.4     01-22  Mg     2.3     01-22    TPro  6.5  /  Alb  3.3<L>  /  TBili  1.0  /  DBili  0.5<H>  /  AST  41<H>  /  ALT  70<H>  /  AlkPhos  51  01-21    Creatinine Trend: 1.97<--, 2.09<--, 2.25<--, 2.36<--, 2.56<--, 2.86<--    COAGS:           T(C): 36.4 (01-22-18 @ 12:00), Max: 36.4 (01-22-18 @ 12:00)  HR: 112 (01-22-18 @ 13:00) (86 - 112)  BP: 150/91 (01-22-18 @ 13:00) (125/69 - 159/86)  RR: 24 (01-22-18 @ 13:00) (18 - 27)  SpO2: 98% (01-22-18 @ 13:00) (97% - 100%)  Wt(kg): --    I&O's Summary    21 Jan 2018 07:01  -  22 Jan 2018 07:00  --------------------------------------------------------  IN: 1460 mL / OUT: 1720 mL / NET: -260 mL    22 Jan 2018 07:01  -  22 Jan 2018 13:32  --------------------------------------------------------  IN: 500 mL / OUT: 200 mL / NET: 300 mL            HEENT:   Normal oral mucosa,   Lymphatic: No obvious lymphadenopathy , no edema  Cardiovascular: Normal S1 S2, No JVD, 1/6 PEDRO murmur, Peripheral pulses palpable 2+ bilaterally  Respiratory: Coarse mechanical BS, normal effort 	  Gastrointestinal:  Soft, Non-tender, + BS	  Skin: No rashes,  No cyanosis, warm to touch  Musculoskeletal: unable to fully assess  Psychiatry:  Confused      TELEMETRY: 	afib        ASSESSMENT/PLAN: 	89y Male ? cirrhosis found down by his superintendant,  admitted with respiratory failure new rapid afib, shock, found with severe LV dyfx  of unknown duration.    - Change to PO Dig and lopressor if taking PO  - Remainder of cardiac w/u pending clinical course  - Awaiting speech and swallow  - Abx per primary team        Donald Logan MD, FACC  [facility], [facility]  [address] [place] [address]  PHONE:  [phone]  BEEPER : [phone]

## 2018-01-22 NOTE — PROGRESS NOTE ADULT - ATTENDING COMMENTS
84 M with  PMH  DM, HTN, cirrhosis is BIBEMS after being found on floor in respiratory distress after 2 days, and with significant metabolic acidosis from ARF with oliguria from dehydration,  lactic acidosis and fevers. Pt admitted to MICU for Acute Hypoxic  Respiratory Failure  and shock in setting of likely sepsis vs hypovolemia. Thoughts of HRS but pt responded to fluid resuscitation with albumin and Octreotide. Pt started having good UO and kidney function started to improved.           Problem/Plan - 1:   Acute hypoxic Respiratory failure   - likely in setting of aspiration PNA, flu  and fluid overload after resuscitation    - s/p intubation  on NC tolerating well   - f/u S/S   - c/w  abx since prolactin 6.13  -finish Tamiflu   - off droplet precaution

## 2018-01-22 NOTE — PROGRESS NOTE ADULT - SUBJECTIVE AND OBJECTIVE BOX
SUBJECTIVE:  Patient is awake and follows simple commands. Able to protect airways.     REVIEW OF SYSTEMS:  Patient has incoherent speech hence unable to get ROS.       MEDICATIONS  (STANDING):  ampicillin/sulbactam  IVPB 1.5 Gram(s) IV Intermittent every 12 hours  dextrose 5%. 1000 milliLiter(s) (100 mL/Hr) IV Continuous <Continuous>  digoxin  Injectable 0.125 milliGRAM(s) IV Push daily  lactobacillus acidophilus 1 Tablet(s) Oral every 8 hours  metoprolol    tartrate Injectable 2.5 milliGRAM(s) IV Push every 4 hours  pantoprazole  Injectable 40 milliGRAM(s) IV Push two times a day  potassium phosphate IVPB 15 milliMole(s) IV Intermittent once    MEDICATIONS  (PRN):  haloperidol    Injectable 2 milliGRAM(s) IV Push every 4 hours PRN agitation    Vital Signs  Vital Signs Last 24 Hrs  T(C): 36.3 (22 Jan 2018 08:00), Max: 36.3 (22 Jan 2018 08:00)  T(F): 97.4 (22 Jan 2018 08:00), Max: 97.4 (22 Jan 2018 08:00)  HR: 100 (22 Jan 2018 11:00) (86 - 112)  BP: 140/80 (22 Jan 2018 11:00) (125/69 - 171/82)  BP(mean): 95 (22 Jan 2018 11:00) (75 - 107)  RR: 23 (22 Jan 2018 11:00) (18 - 27)  SpO2: 99% (22 Jan 2018 11:00) (97% - 100%)    01-21 @ 07:01 - 01-22 @ 07:00  --------------------------------------------------------  IN: 1460 mL / OUT: 1720 mL / NET: -260 mL    01-22 @ 07:01 - 01-22 @ 11:13  --------------------------------------------------------  IN: 500 mL / OUT: 200 mL / NET: 300 mL        PHYSICAL EXAM:  Constitutional: well developed, well nourished  and in nad  Eyes: non icteric sclera seen and mild pallor of the conj noted  Neck :supple, no JVD  Respiratory: distant breath sounds with no rales or wheezing heard  Cardiovascular:S1, s2 present   Gastrointestinal: soft, non tender non distended and nl bs heard  Extremities: no cyanosis, edema or clubbing   Neurological: alert and oriented  Skin: No rashes    LABS:                          12.9   9.4   )-----------( 158      ( 22 Jan 2018 06:47 )             41.8   01-22    154<H>  |  120<H>  |  62<H>  ----------------------------<  286<H>  4.3   |  24  |  1.97<H>    Ca    8.6      22 Jan 2018 06:47  Phos  2.4     01-22  Mg     2.3     01-22    TPro  6.5  /  Alb  3.3<L>  /  TBili  1.0  /  DBili  0.5<H>  /  AST  41<H>  /  ALT  70<H>  /  AlkPhos  51  01-21

## 2018-01-22 NOTE — CHART NOTE - NSCHARTNOTEFT_GEN_A_CORE
[ ]INTERVAL HPI/ OVERNIGHT EVENTS:  no events       PRESSORS: [  ] YES [ x] NO  WHICH:     ANTIBIOTICS:  Unasyn               DATE STARTED: 1/18    ampicillin/sulbactam  IVPB 1.5 Gram(s) IV Intermittent every 12 hours  dextrose 5%. 1000 milliLiter(s) IV Continuous <Continuous>  digoxin  Injectable 0.125 milliGRAM(s) IV Push daily  haloperidol    Injectable 2 milliGRAM(s) IV Push every 4 hours PRN  lactobacillus acidophilus 1 Tablet(s) Oral every 8 hours  metoprolol    tartrate Injectable 2.5 milliGRAM(s) IV Push every 4 hours  pantoprazole  Injectable 40 milliGRAM(s) IV Push two times a day  potassium phosphate IVPB 15 milliMole(s) IV Intermittent once      CENTRAL LINE: [x ] YES [ ] NO  LOCATION: [facility]    DATE INSERTED: 1/16  REMOVE: [ ] YES [ ] NO  EXPLAIN:    CAT: [x ] YES [ ] NO    DATE INSERTED: 1/19  REMOVE:  [ ] YES [ ] NO  EXPLAIN:    PMH -reviewed admission note, no change since admission  PAST MEDICAL & SURGICAL HISTORY:      T(C): 36.3 (01-22-18 @ 08:00), Max: 36.3 (01-22-18 @ 08:00)  HR: 109 (01-22-18 @ 09:00)  BP: 125/69 (01-22-18 @ 09:00)  RR: 25 (01-22-18 @ 09:00)  SpO2: 100% (01-22-18 @ 09:00)  Wt(kg): --      01-21 @ 07:01  -  01-22 @ 07:00  --------------------------------------------------------  IN: 1460 mL / OUT: 1720 mL / NET: -260 mL      PHYSICAL EXAM:    GENERAL: NAD  HEENT: dry mucosa, PERRLA  NECK: supple neck, no JVD  CVS: tachycardic, no RMG  CHEST/LUNG: CTAx2, no wheezing, no rales, no rhonchi  ABDOMEN:  soft, non tender. no distention, no rigidity   EXTREMITIES:   no edema, no cyanosis   SKIN:  no rashes, no blisters  Neuro: awake and alert     LABS:  CBC Full  -  ( 22 Jan 2018 06:47 )  WBC Count : 9.4 K/uL  Hemoglobin : 12.9 g/dL  Hematocrit : 41.8 %  Platelet Count - Automated : 158 K/uL  Mean Cell Volume : 101.1 fl  Mean Cell Hemoglobin : 31.2 pg  Mean Cell Hemoglobin Concentration : 30.9 gm/dL  Auto Neutrophil # : x  Auto Lymphocyte # : x  Auto Monocyte # : x  Auto Eosinophil # : x  Auto Basophil # : x  Auto Neutrophil % : x  Auto Lymphocyte % : x  Auto Monocyte % : x  Auto Eosinophil % : x  Auto Basophil % : x    01-22    154<H>  |  120<H>  |  62<H>  ----------------------------<  286<H>  4.3   |  24  |  1.97<H>    Ca    8.6      22 Jan 2018 06:47  Phos  2.4     01-22  Mg     2.3     01-22    TPro  6.5  /  Alb  3.3<L>  /  TBili  1.0  /  DBili  0.5<H>  /  AST  41<H>  /  ALT  70<H>  /  AlkPhos  51  01-21            RADIOLOGY & ADDITIONAL STUDIES REVIEWED:      84 M with  PMH  DM, HTN, cirrhosis is BIBEMS after being found on floor in respiratory distress after 2 days, and with significant metabolic acidosis from ARF with oliguria from dehydration,  lactic acidosis and fevers. Pt admitted to MICU for Acute Hypoxic  Respiratory Failure  and shock in setting of likely sepsis vs hypovolemia. Thoughts of HRS but pt responded to fluid resuscitation with albumin and Octreotide. Pt started having good UO and kidney function started to improved.           Problem/Plan - 1:     Acute hypoxic Respiratory failure     - likely in setting of aspiration PNA, flu  and fluid overload after resuscitation    - s/p intubation  on NC tolerating well   - f/u S/S to start diet   - c/w  abx since prolactin 6.13  -finish Tamiflu   - off droplet precaution       Problem/Plan - 2:  ARF  - most likely 2/2  ATN 2/2 dehydration   - off lasix   s/p albumin and  octreotide   after volume expansion pt show response in renal function and UO  low UO: 60-70         Problem/Plan - 3:     Shock     - bacteremia w/ Gemella -->on Unasyn   - hypovolemic shock was also in  differential   -off pressors with good BP          Problem/Plan - 4:    Metabolic acidosis.  - resolved   - most likely 2/2  ARF and sepsis          Problem/Plan - 5:  Afib.   - unknown if new or old  - was loaded with amiodarone and started on maintenance but later discontinued as patient already has transaminitis.   - Now on digoxin and lopressor IV change to PO after S/S clears patient   - Heparin drip dced due to BRBPR but restarted today again since Hg has been stable   - monitor CBC @ 10   - Hg stable         Problem/Plan - 6: Transaminitis    Abd US shows cirrhosis   most likely early stages on cirrhosis since pt has normal platelets, normal bili and no ascites   most likely decompensated due to increase INR and FT  could also  be 2/2 hepatic congestion 2/2 HF  monitor LFT     Problem/Plan - 7: Hypernatremia    154 today   - on D5 for now  monitor BMP @ 5 pm        Problem/Plan - 8:    Prophylactic measure.  Plan: continue Protonix and dvt px with Heparin [ ]INTERVAL HPI/ OVERNIGHT EVENTS:  no events       PRESSORS: [  ] YES [ x] NO  WHICH:     ANTIBIOTICS:  Unasyn               DATE STARTED: 1/18    ampicillin/sulbactam  IVPB 1.5 Gram(s) IV Intermittent every 12 hours  dextrose 5%. 1000 milliLiter(s) IV Continuous <Continuous>  digoxin  Injectable 0.125 milliGRAM(s) IV Push daily  haloperidol    Injectable 2 milliGRAM(s) IV Push every 4 hours PRN  lactobacillus acidophilus 1 Tablet(s) Oral every 8 hours  metoprolol    tartrate Injectable 2.5 milliGRAM(s) IV Push every 4 hours  pantoprazole  Injectable 40 milliGRAM(s) IV Push two times a day  potassium phosphate IVPB 15 milliMole(s) IV Intermittent once      CENTRAL LINE: [x ] YES [ ] NO  LOCATION: [facility]    DATE INSERTED: 1/16  REMOVE: [ ] YES [ ] NO  EXPLAIN:    CAT: [x ] YES [ ] NO    DATE INSERTED: 1/19  REMOVE:  [ ] YES [ ] NO  EXPLAIN:    PMH -reviewed admission note, no change since admission  PAST MEDICAL & SURGICAL HISTORY:      T(C): 36.3 (01-22-18 @ 08:00), Max: 36.3 (01-22-18 @ 08:00)  HR: 109 (01-22-18 @ 09:00)  BP: 125/69 (01-22-18 @ 09:00)  RR: 25 (01-22-18 @ 09:00)  SpO2: 100% (01-22-18 @ 09:00)  Wt(kg): --      01-21 @ 07:01  -  01-22 @ 07:00  --------------------------------------------------------  IN: 1460 mL / OUT: 1720 mL / NET: -260 mL      PHYSICAL EXAM:    GENERAL: NAD  HEENT: dry mucosa, PERRLA  NECK: supple neck, no JVD  CVS: tachycardic, no RMG  CHEST/LUNG: CTAx2, no wheezing, no rales, no rhonchi  ABDOMEN:  soft, non tender. no distention, no rigidity   EXTREMITIES:   no edema, no cyanosis   SKIN:  no rashes, no blisters  Neuro: awake and alert     LABS:  CBC Full  -  ( 22 Jan 2018 06:47 )  WBC Count : 9.4 K/uL  Hemoglobin : 12.9 g/dL  Hematocrit : 41.8 %  Platelet Count - Automated : 158 K/uL  Mean Cell Volume : 101.1 fl  Mean Cell Hemoglobin : 31.2 pg  Mean Cell Hemoglobin Concentration : 30.9 gm/dL  Auto Neutrophil # : x  Auto Lymphocyte # : x  Auto Monocyte # : x  Auto Eosinophil # : x  Auto Basophil # : x  Auto Neutrophil % : x  Auto Lymphocyte % : x  Auto Monocyte % : x  Auto Eosinophil % : x  Auto Basophil % : x    01-22    154<H>  |  120<H>  |  62<H>  ----------------------------<  286<H>  4.3   |  24  |  1.97<H>    Ca    8.6      22 Jan 2018 06:47  Phos  2.4     01-22  Mg     2.3     01-22    TPro  6.5  /  Alb  3.3<L>  /  TBili  1.0  /  DBili  0.5<H>  /  AST  41<H>  /  ALT  70<H>  /  AlkPhos  51  01-21            RADIOLOGY & ADDITIONAL STUDIES REVIEWED:      84 M with  PMH  DM, HTN, cirrhosis is BIBEMS after being found on floor in respiratory distress after 2 days, and with significant metabolic acidosis from ARF with oliguria from dehydration,  lactic acidosis and fevers. Pt admitted to MICU for Acute Hypoxic  Respiratory Failure  and shock in setting of likely sepsis vs hypovolemia. Thoughts of HRS but pt responded to fluid resuscitation with albumin and Octreotide. Pt started having good UO and kidney function started to improved.           Problem/Plan - 1:     Acute hypoxic Respiratory failure     - likely in setting of aspiration PNA, flu  and fluid overload after resuscitation    - s/p intubation  on NC tolerating well   - f/u S/S to start diet   - c/w  abx since prolactin 6.13  -finish Tamiflu   - off droplet precaution       Problem/Plan - 2:  ARF  - most likely 2/2  ATN 2/2 dehydration   - off lasix   s/p albumin and  octreotide   after volume expansion pt show response in renal function and UO  low UO: 60-70         Problem/Plan - 3:     Shock     - bacteremia w/ Gemella -->on Unasyn   - hypovolemic shock was also in  differential   -off pressors with good BP          Problem/Plan - 4:    Metabolic acidosis.  - resolved   - most likely 2/2  ARF and sepsis          Problem/Plan - 5:  Afib.   - unknown if new or old  - was loaded with amiodarone and started on maintenance but later discontinued as patient already has transaminitis.   - Now on digoxin and lopressor IV change to PO after S/S clears patient   - Heparin drip dced due to BRBPR but restarted today again since Hg has been stable   - monitor CBC @ 10   - Hg stable         Problem/Plan - 6: Transaminitis    Abd US shows cirrhosis   most likely early stages on cirrhosis since pt has normal platelets, normal bili and no ascites   most likely decompensated due to increase INR and FT  could also  be 2/2 hepatic congestion 2/2 HF  monitor LFT     Problem/Plan - 7: Hypernatremia    154 today   - on D5 for now  monitor BMP @ 5 pm        Problem/Plan - 8:    Prophylactic measure.  Plan: continue Protonix and dvt px with Heparin drip      Going To [facility] [ ]INTERVAL HPI/ OVERNIGHT EVENTS:  no events       PRESSORS: [  ] YES [ x] NO  WHICH:     ANTIBIOTICS:  Unasyn               DATE STARTED: 1/18    ampicillin/sulbactam  IVPB 1.5 Gram(s) IV Intermittent every 12 hours  dextrose 5%. 1000 milliLiter(s) IV Continuous <Continuous>  digoxin  Injectable 0.125 milliGRAM(s) IV Push daily  haloperidol    Injectable 2 milliGRAM(s) IV Push every 4 hours PRN  lactobacillus acidophilus 1 Tablet(s) Oral every 8 hours  metoprolol    tartrate Injectable 2.5 milliGRAM(s) IV Push every 4 hours  pantoprazole  Injectable 40 milliGRAM(s) IV Push two times a day  potassium phosphate IVPB 15 milliMole(s) IV Intermittent once      CENTRAL LINE: [x ] YES [ ] NO  LOCATION: [facility]    DATE INSERTED: 1/16  REMOVE: [ ] YES [ ] NO  EXPLAIN:    CAT: [x ] YES [ ] NO    DATE INSERTED: 1/19  REMOVE:  [ ] YES [ ] NO  EXPLAIN:    PMH -reviewed admission note, no change since admission  PAST MEDICAL & SURGICAL HISTORY:      T(C): 36.3 (01-22-18 @ 08:00), Max: 36.3 (01-22-18 @ 08:00)  HR: 109 (01-22-18 @ 09:00)  BP: 125/69 (01-22-18 @ 09:00)  RR: 25 (01-22-18 @ 09:00)  SpO2: 100% (01-22-18 @ 09:00)  Wt(kg): --      01-21 @ 07:01  -  01-22 @ 07:00  --------------------------------------------------------  IN: 1460 mL / OUT: 1720 mL / NET: -260 mL      PHYSICAL EXAM:    GENERAL: NAD  HEENT: dry mucosa, PERRLA  NECK: supple neck, no JVD  CVS: tachycardic, no RMG  CHEST/LUNG: CTAx2, no wheezing, no rales, no rhonchi  ABDOMEN:  soft, non tender. no distention, no rigidity   EXTREMITIES:   no edema, no cyanosis   SKIN:  no rashes, no blisters  Neuro: awake and alert     LABS:  CBC Full  -  ( 22 Jan 2018 06:47 )  WBC Count : 9.4 K/uL  Hemoglobin : 12.9 g/dL  Hematocrit : 41.8 %  Platelet Count - Automated : 158 K/uL  Mean Cell Volume : 101.1 fl  Mean Cell Hemoglobin : 31.2 pg  Mean Cell Hemoglobin Concentration : 30.9 gm/dL  Auto Neutrophil # : x  Auto Lymphocyte # : x  Auto Monocyte # : x  Auto Eosinophil # : x  Auto Basophil # : x  Auto Neutrophil % : x  Auto Lymphocyte % : x  Auto Monocyte % : x  Auto Eosinophil % : x  Auto Basophil % : x    01-22    154<H>  |  120<H>  |  62<H>  ----------------------------<  286<H>  4.3   |  24  |  1.97<H>    Ca    8.6      22 Jan 2018 06:47  Phos  2.4     01-22  Mg     2.3     01-22    TPro  6.5  /  Alb  3.3<L>  /  TBili  1.0  /  DBili  0.5<H>  /  AST  41<H>  /  ALT  70<H>  /  AlkPhos  51  01-21            RADIOLOGY & ADDITIONAL STUDIES REVIEWED:      84 M with  PMH  DM, HTN, cirrhosis is BIBEMS after being found on floor in respiratory distress after 2 days, and with significant metabolic acidosis from ARF with oliguria from dehydration,  lactic acidosis and fevers. Pt admitted to MICU for Acute Hypoxic  Respiratory Failure  and shock in setting of likely sepsis vs hypovolemia. Thoughts of HRS but pt responded to fluid resuscitation with albumin and Octreotide. Pt started having good UO and kidney function started to improved.           Problem/Plan - 1:     Acute hypoxic Respiratory failure     - likely in setting of aspiration PNA, flu  and fluid overload after resuscitation    - s/p intubation  on NC tolerating well   - f/u S/S to start diet   - c/w  abx since prolactin 6.13  -finish Tamiflu   - off droplet precaution       Problem/Plan - 2:  ARF  - most likely 2/2  ATN 2/2 dehydration   - off lasix   s/p albumin and  octreotide   after volume expansion pt show response in renal function and UO  low UO: 60-70         Problem/Plan - 3:     Shock     - bacteremia w/ Gemella -->on Unasyn   - hypovolemic shock was also in  differential   -off pressors with good BP          Problem/Plan - 4:    Metabolic acidosis.  - resolved   - most likely 2/2  ARF and sepsis          Problem/Plan - 5:  Afib.   - unknown if new or old  - was loaded with amiodarone and started on maintenance but later discontinued as patient already has transaminitis.   - Now on digoxin and lopressor IV change to PO after S/S clears patient   - Heparin drip dced due to BRBPR but restarted today again since Hg has been stable   - monitor CBC @ 10   - Hg stable         Problem/Plan - 6: Transaminitis    Abd US shows cirrhosis   most likely early stages on cirrhosis since pt has normal platelets, normal bili and no ascites   most likely decompensated due to increase INR and FT  could also  be 2/2 hepatic congestion 2/2 HF  monitor LFT     Problem/Plan - 7: Hypernatremia    154 today   - on D5 for now  monitor BMP @ 5 pm     Problem/Plan - 8:    DM    on HSS  waiting for S/S eval to start diet   pt was on Glucerna but pulled NGT today        Problem/Plan - 9:    Prophylactic measure.  Plan: continue Protonix and dvt px with Heparin drip      Going To [facility] [ ]INTERVAL HPI/ OVERNIGHT EVENTS:  no events       PRESSORS: [  ] YES [ x] NO  WHICH:     ANTIBIOTICS:  Unasyn               DATE STARTED: 1/18    ampicillin/sulbactam  IVPB 1.5 Gram(s) IV Intermittent every 12 hours  dextrose 5%. 1000 milliLiter(s) IV Continuous <Continuous>  digoxin  Injectable 0.125 milliGRAM(s) IV Push daily  haloperidol    Injectable 2 milliGRAM(s) IV Push every 4 hours PRN  lactobacillus acidophilus 1 Tablet(s) Oral every 8 hours  metoprolol    tartrate Injectable 2.5 milliGRAM(s) IV Push every 4 hours  pantoprazole  Injectable 40 milliGRAM(s) IV Push two times a day  potassium phosphate IVPB 15 milliMole(s) IV Intermittent once      CENTRAL LINE: [x ] YES [ ] NO  LOCATION: [facility]    DATE INSERTED: 1/16  REMOVE: [ ] YES [ ] NO  EXPLAIN:    CAT: [x ] YES [ ] NO    DATE INSERTED: 1/19  REMOVE:  [ ] YES [ ] NO  EXPLAIN:    PMH -reviewed admission note, no change since admission  PAST MEDICAL & SURGICAL HISTORY:      T(C): 36.3 (01-22-18 @ 08:00), Max: 36.3 (01-22-18 @ 08:00)  HR: 109 (01-22-18 @ 09:00)  BP: 125/69 (01-22-18 @ 09:00)  RR: 25 (01-22-18 @ 09:00)  SpO2: 100% (01-22-18 @ 09:00)  Wt(kg): --      01-21 @ 07:01  -  01-22 @ 07:00  --------------------------------------------------------              RADIOLOGY & ADDITIONAL STUDIES REVIEWED:      84 M with  PMH  DM, HTN, cirrhosis is BIBEMS after being found on floor in respiratory distress after 2 days, and with significant metabolic acidosis from ARF with oliguria from dehydration,  lactic acidosis and fevers. Pt admitted to MICU for Acute Hypoxic  Respiratory Failure  and shock in setting of likely sepsis vs hypovolemia. Thoughts of HRS but pt responded to fluid resuscitation with albumin and Octreotide. Pt started having good UO and kidney function started to improved.       cousin Andrew Cameron: [phone]  cousin in law Sorin Barnes [phone]        Problem/Plan - 1:     Acute hypoxic Respiratory failure     - likely in setting of aspiration PNA, Flu  and fluid overload after resuscitation    - s/p intubation  on NC tolerating well   - f/u S/S to start diet   - c/w  abx since prolactin 6.13  -finish Tamiflu   - off droplet precaution       Problem/Plan - 2:    ARF    - most likely 2/2  ATN 2/2 dehydration   - off lasix   s/p albumin and  octreotide   after volume expansion pt show response in renal function and UO  low UO: 60-70         Problem/Plan - 3:     Shock     - bacteremia w/ Gemella -->on Unasyn   - hypovolemic shock was also in  differential   -off pressors with good BP          Problem/Plan - 4:    Metabolic acidosis.  - resolved   - most likely 2/2  ARF and sepsis          Problem/Plan - 5:  Afib.   - unknown if new or old  - was loaded with amiodarone and started on maintenance but later discontinued as patient already has transaminitis.   - Now on digoxin and lopressor IV change to PO after S/S clears patient   - Heparin drip dced due to BRBPR but restarted today again since Hg has been stable   - monitor CBC @ 10   - Hg stable         Problem/Plan - 6: Transaminitis    Abd US shows cirrhosis   most likely early stages on cirrhosis since pt has normal platelets, normal bili and no ascites   most likely decompensated due to increase INR and FT  could also  be 2/2 hepatic congestion 2/2 HF  monitor LFT     Problem/Plan - 7: Hypernatremia    154 today   - on D5 for now  monitor BMP @ 6 pm     Problem/Plan - 8:    DM    on HSS  waiting for S/S eval to start diet   pt was on Glucerna but pulled NGT today        Problem/Plan - 9:    Prophylactic measure.  Plan: continue Protonix and dvt px with Heparin drip      Going To [facility] [ ]INTERVAL HPI/ OVERNIGHT EVENTS:  no events       PRESSORS: [  ] YES [ x] NO  WHICH:     ANTIBIOTICS:  Unasyn               DATE STARTED: 1/18    ampicillin/sulbactam  IVPB 1.5 Gram(s) IV Intermittent every 12 hours  dextrose 5%. 1000 milliLiter(s) IV Continuous <Continuous>  digoxin  Injectable 0.125 milliGRAM(s) IV Push daily  haloperidol    Injectable 2 milliGRAM(s) IV Push every 4 hours PRN  lactobacillus acidophilus 1 Tablet(s) Oral every 8 hours  metoprolol    tartrate Injectable 2.5 milliGRAM(s) IV Push every 4 hours  pantoprazole  Injectable 40 milliGRAM(s) IV Push two times a day  potassium phosphate IVPB 15 milliMole(s) IV Intermittent once      CENTRAL LINE: [x ] YES [ ] NO  LOCATION: [facility]    DATE INSERTED: 1/16  REMOVE: [ ] YES [ ] NO  EXPLAIN:    CAT: [x ] YES [ ] NO    DATE INSERTED: 1/19  REMOVE:  [ ] YES [ ] NO  EXPLAIN:    PMH -reviewed admission note, no change since admission  PAST MEDICAL & SURGICAL HISTORY:      T(C): 36.3 (01-22-18 @ 08:00), Max: 36.3 (01-22-18 @ 08:00)  HR: 109 (01-22-18 @ 09:00)  BP: 125/69 (01-22-18 @ 09:00)  RR: 25 (01-22-18 @ 09:00)  SpO2: 100% (01-22-18 @ 09:00)  Wt(kg): --      01-21 @ 07:01  -  01-22 @ 07:00  --------------------------------------------------------              RADIOLOGY & ADDITIONAL STUDIES REVIEWED:      84 M with  PMH  DM, HTN, cirrhosis is BIBEMS after being found on floor in respiratory distress after 2 days, and with significant metabolic acidosis from ARF with oliguria from dehydration,  lactic acidosis and fevers. Pt admitted to MICU for Acute Hypoxic  Respiratory Failure  and shock in setting of likely sepsis vs hypovolemia. Thoughts of HRS but pt responded to fluid resuscitation with albumin and Octreotide. Pt started having good UO and kidney function started to improved.       cousin Andrew Cameron: [phone]  cousin in law Sorin Barnes [phone]        Problem/Plan - 1:     Acute hypoxic Respiratory failure     - likely in setting of aspiration PNA, Flu  and fluid overload after resuscitation    - s/p intubation  on NC tolerating well   - f/u S/S to start diet   - c/w  abx since prolactin 6.13  -finish Tamiflu   - off droplet precaution       Problem/Plan - 2:    ARF    - most likely 2/2  ATN 2/2 dehydration   - off lasix   s/p albumin and  octreotide   after volume expansion pt show response in renal function and UO  low UO: 60-70         Problem/Plan - 3:     Shock     - bacteremia w/ Gemella -->on Unasyn   - hypovolemic shock was also in  differential   -off pressors with good BP          Problem/Plan - 4:    Metabolic acidosis.  - resolved   - most likely 2/2  ARF and sepsis          Problem/Plan - 5:  Afib w/ RVR   - unknown if new or old  - was loaded with amiodarone and started on maintenance but later discontinued as patient already has transaminitis.   - Now on IV digoxin and lopressor  change to PO after S/S clears patient   - Heparin drip dced due to BRBPR but restarted today again since Hg has been stable   - monitor CBC @ 10   - Hg stable         Problem/Plan - 6: Transaminitis    Abd US shows cirrhosis   most likely early stages on cirrhosis since pt has normal platelets, normal bili and no ascites   most likely decompensated due to increase INR and FT  could also  be 2/2 hepatic congestion 2/2 HF  monitor LFT     Problem/Plan - 7: Hypernatremia    154 today   - on D5 for now  monitor BMP @ 8 pm     Problem/Plan - 8:    DM    on HSS  waiting for S/S eval to start diet   pt was on Glucerna but pulled NGT today        Problem/Plan - 9:    Prophylactic measure.  Plan: continue Protonix and dvt px with Heparin drip      Going [facility] RADIOLOGY & ADDITIONAL STUDIES REVIEWED:      84 M with  PMH  DM, HTN, cirrhosis is BIBEMS after being found on floor in respiratory distress after 2 days, and with significant metabolic acidosis from ARF with oliguria from dehydration,  lactic acidosis and fevers. Pt admitted to MICU for Acute Hypoxic  Respiratory Failure  and shock in setting of likely sepsis vs hypovolemia. Thoughts of HRS but pt responded to fluid resuscitation with albumin and Octreotide. Pt started having good UO and kidney function started to improved.       cousin Andrew Barnes: [phone]  cousin in law Sorin Barnes [phone]        Problem/Plan - 1:     Acute hypoxic Respiratory failure     - likely in setting of aspiration PNA, Flu  and fluid overload after resuscitation    - s/p intubation  on NC tolerating well   - f/u S/S to start diet   - c/w  abx since procalcitonin  6.13  -finish Tamiflu   - off droplet precaution       Problem/Plan - 2:    ARF    - most likely 2/2  ATN 2/2 dehydration   - off lasix   s/p albumin and  octreotide due to concenrs of HRS  after volume expansion pt show response in renal function and UO  UO: 60-70  Dr: Griffin      Problem/Plan - 3:     Shock     - bacteremia w/ Gemella -->on Unasyn   - hypovolemic shock was also in  differential   -off pressors with good BP   - repeat Bcs negative          Problem/Plan - 4:    Metabolic acidosis.  - resolved   - most likely 2/2  ARF and sepsis          Problem/Plan - 5:  Afib w/ RVR   - unknown if new or old  - was loaded with amiodarone and started on maintenance but later discontinued as patient already has transaminitis.   - Now on IV digoxin and lopressor  change to PO after S/S clears patient   - Heparin drip dced due to BRBPR but restarted today again since Hg has been stable   - monitor CBC @ 8  - Hg stable  - Dr Logan       Problem/Plan - 6: Transaminitis    Abd US shows cirrhosis   most likely early stages on cirrhosis since pt has normal platelets, normal bili and no ascites   most likely decompensated due to increase INR and FT  could also  be 2/2 hepatic congestion 2/2 HF  monitor LFT    GI: Dr. Murphy      Problem/Plan - 7: Hypernatremia    154 today   - on D5 for now  monitor BMP @ 8 pm     Problem/Plan - 8:    DM    on HSS  waiting for S/S eval to start diet   pt was on Glucerna but pulled NGT today        Problem/Plan - 9:    Prophylactic measure.  Plan: continue Protonix and dvt px with Heparin drip      Going To [facility]

## 2018-01-22 NOTE — PROGRESS NOTE ADULT - ASSESSMENT
89 male from home, found on floor, +ALONDRA, dehydration, afib with RVR, encephalopathy was fluid resuscitated in ED, developed acute respiratory failure secondary to pulmonary edema intubated and admitted to ICU.     1. ALONDRA: R/O Hepato-renal syndrome: patient with liver cirrhosis and urine Na<5,on Albumin plus octreotide, now renal function is improving with good U/O   - keep SBP>110  - Keep patient euvolemic and renal diet  - Avoid Nephrotoxic Meds/ Agents such as (NSAIDs, IV contrast, Aminoglycosides such as gentamicin, -Gadolinium contrast, Phosphate containing enemas, etc..)  - Adjust Medications according to eGFR  - f/u bmp daily    2. Hypernatremia.  -Na level is 154 (was 155<--156)  -repeat urine na,cr level to calculate free water level and adequate fluid resuscitation   -f/u Na level daily    3. Hyperkalemia.   - resolved   -secondary to alondra  -keep k >4 and <5    4. Hyperphosphatemia:   resolved     5.R/o CKD:r/o stage 3 as per pcp , secondary to htn?  -Keep patient euvolemic and renal diet  -Avoid Nephrotoxic Meds/ Agents such as (NSAIDs, IV contrast, Aminoglycosides such as gentamicin, -Gadolinium contrast, Phosphate containing enemas, etc..)  -Adjust Medications according to eGFR 89 male from home, found on floor, +ALONDRA, dehydration, afib with RVR, encephalopathy was fluid resuscitated in ED, developed acute respiratory failure secondary to pulmonary edema intubated and admitted to ICU.     1. ALONDRA: R/O Hepato-renal syndrome: patient with liver cirrhosis and urine Na<5,on Albumin plus octreotide, now renal function is improving with good U/O   - cr trending down 2.09-->1.97; UO 60-80cc/hr net 200ml neg in 24 hours   - keep SBP>110  - Keep patient euvolemic and renal diet  - Avoid Nephrotoxic Meds/ Agents such as (NSAIDs, IV contrast, Aminoglycosides such as gentamicin, -Gadolinium contrast, Phosphate containing enemas, etc..)  - Adjust Medications according to eGFR  - f/u bmp daily    2. Hypernatremia.  -Na level is 154 (was 155<--156)  -repeat urine na,cr level to calculate free water level and adequate fluid resuscitation   -f/u Na level daily    3. Hyperkalemia.   - resolved   -secondary to alondra  -keep k >4 and <5    4. Hyperphosphatemia:   resolved     5.R/o CKD:r/o stage 3 as per pcp , secondary to htn? 89 male from home, found on floor, +ALONDRA, dehydration, afib with RVR, encephalopathy was fluid resuscitated in ED, developed acute respiratory failure secondary to pulmonary edema intubated and admitted to ICU.     1. ALONDRA: R/O Hepato-renal syndrome: patient with liver cirrhosis and urine Na<5,on Albumin plus octreotide, now renal function is improving with good U/O   - cr trending down 2.09-->1.97; UO 60-80cc/hr net 200ml neg in 24 hours   - keep SBP>110  - Keep patient euvolemic and renal diet  - Avoid Nephrotoxic Meds/ Agents such as (NSAIDs, IV contrast, Aminoglycosides such as gentamicin, -Gadolinium contrast, Phosphate containing enemas, etc..)  - Adjust Medications according to eGFR  - f/u bmp daily    2. Hypernatremia.  -Na level is 154 (was 155<--156)  -repeat urine na,cr level to calculate free water level and adequate fluid resuscitation   -f/u Na level q 12hrs  -continue hypotonic iv hydration as ordered    3. Hyperkalemia.   - resolved   -secondary to alondra  -keep k >4 and <5    4. Hyperphosphatemia:   resolved     5.R/o CKD:r/o stage 3 as per pcp , secondary to htn?  -Keep patient euvolemic and renal diet  -Avoid Nephrotoxic Meds/ Agents such as (NSAIDs, IV contrast, Aminoglycosides such as gentamicin, -Gadolinium contrast, Phosphate containing enemas, etc..)  -Adjust Medications according to eGFR  -f/u bmp daily  -needs outapatient renal f/u with Dr Muse in 2 weeks post discharge  6/Renal cysts;rt cyst is complex  -suggest to repeat renal sono in 3 months as outpatient

## 2018-01-23 NOTE — PROGRESS NOTE ADULT - PROBLEM SELECTOR PLAN 5
- bacteremia w/ Gemella -->on Unasyn (total 10 days)  - hypovolemic shock was also in  differential   -off pressors with good BP   -repeat Bcs negative

## 2018-01-23 NOTE — PROGRESS NOTE ADULT - ASSESSMENT
89 male from home, found on floor, +ALONDRA, dehydration, afib with RVR, encephalopathy was fluid resuscitated in ED, developed acute respiratory failure secondary to pulmonary edema s/p ICU stay now on telemetry floor.     1. ALONDRA: R/O Hepato-renal syndrome: patient with liver cirrhosis and urine Na<5,on Albumin plus octreotide, now renal function is improving with good U/O   - cr trending down 1.97-->1.46 ; UO is 90cc/hr and net +545ml. Urine is dark; ?hematuria    - keep SBP>110  - Keep patient euvolemic and renal diet  - Avoid Nephrotoxic Meds/ Agents such as (NSAIDs, IV contrast, Aminoglycosides such as gentamicin, -Gadolinium contrast, Phosphate containing enemas, etc..)  - Adjust Medications according to eGFR  - f/u bmp daily    2. Hypernatremia.  -Na level improving 154-->152  -f/u Na level q 12hrs  -continue hypotonic iv hydration as ordered    3. Hyperkalemia.   - resolved   -secondary to alondra  -keep k >4 and <5    4. Hyperphosphatemia:   resolved     5.R/o CKD:r/o stage 3 as per pcp , secondary to htn?  -Keep patient euvolemic and renal diet  -Avoid Nephrotoxic Meds/ Agents such as (NSAIDs, IV contrast, Aminoglycosides such as gentamicin, -Gadolinium contrast, Phosphate containing enemas, etc..)  -Adjust Medications according to eGFR  -f/u bmp daily  -needs out-patient renal f/u with Dr Muse in 2 weeks post discharge    6/Renal cysts;rt cyst is complex  -suggest to repeat renal sono in 3 months as outpatient 89 male from home, found on floor, +ALONDRA, dehydration, afib with RVR, encephalopathy was fluid resuscitated in ED, developed acute respiratory failure secondary to pulmonary edema s/p ICU stay now on telemetry floor.     1. ALONDRA: R/O Hepato-renal syndrome: patient with liver cirrhosis and urine Na<5,on Albumin plus octreotide, now renal function is improving with good U/O   - cr trending down 1.97-->1.46 ; UO is 90cc/hr and net +545ml. Urine is dark; ?hematuria    - keep SBP>110  - Keep patient euvolemic and renal diet  - Avoid Nephrotoxic Meds/ Agents such as (NSAIDs, IV contrast, Aminoglycosides such as gentamicin, -Gadolinium contrast, Phosphate containing enemas, etc..)  - Adjust Medications according to eGFR  - f/u bmp daily    2. Hypernatremia.  -Na level improving 154-->152  -f/u Na level q 12hrs  -continue hypotonic iv hydration as ordered    3. Hyperkalemia.   - resolved   -secondary to alondra  -keep k >4 and <5    4. Hyperphosphatemia:   resolved     5.R/o CKD:r/o stage 3  with serum cr around 1.4 as per pcp , secondary to htn?, now pts renal function is around baseline  -Keep patient euvolemic and renal diet  -Avoid Nephrotoxic Meds/ Agents such as (NSAIDs, IV contrast, Aminoglycosides such as gentamicin, -Gadolinium contrast, Phosphate containing enemas, etc..)  -Adjust Medications according to eGFR  -f/u bmp daily  -needs out-patient renal f/u with Dr Muse in 2 weeks post discharge    6/Renal cysts;rt cyst is complex  -suggest to repeat renal sono in 3 months as outpatient

## 2018-01-23 NOTE — PROGRESS NOTE ADULT - SUBJECTIVE AND OBJECTIVE BOX
SUBJECTIVE:  Patient has been downgraded to telemetry floor from ICU. He is awake and follows simple commands.     REVIEW OF SYSTEMS:  Patient has incoherent speech hence unable to get ROS.       MEDICATIONS  (STANDING):  ampicillin/sulbactam  IVPB 1.5 Gram(s) IV Intermittent every 12 hours  dextrose 5%. 1000 milliLiter(s) (75 mL/Hr) IV Continuous <Continuous>  digoxin  Injectable 0.125 milliGRAM(s) IV Push daily  insulin lispro (HumaLOG) corrective regimen sliding scale   SubCutaneous every 6 hours  lactobacillus acidophilus 1 Tablet(s) Oral every 8 hours  metoprolol    tartrate Injectable 2.5 milliGRAM(s) IV Push every 4 hours  pantoprazole  Injectable 40 milliGRAM(s) IV Push two times a day    MEDICATIONS  (PRN):  haloperidol    Injectable 2 milliGRAM(s) IV Push every 4 hours PRN agitation      Vital Signs  Vital Signs Last 24 Hrs  T(C): 36.5 (23 Jan 2018 07:00), Max: 36.8 (23 Jan 2018 00:04)  T(F): 97.7 (23 Jan 2018 07:00), Max: 98.2 (23 Jan 2018 00:04)  HR: 87 (23 Jan 2018 07:00) (87 - 113)  BP: 143/80 (23 Jan 2018 07:00) (140/80 - 168/111)  BP(mean): 124 (22 Jan 2018 14:00) (95 - 124)  RR: 18 (23 Jan 2018 07:00) (18 - 31)  SpO2: 97% (23 Jan 2018 07:00) (94% - 99%)    01-22 @ 07:01  -  01-23 @ 07:00  --------------------------------------------------------  IN: 2210 mL / OUT: 1665 mL / NET: 545 mL          PHYSICAL EXAM:  Constitutional: well developed, well nourished  and in nad  Eyes: non icteric sclera seen and mild pallor of the conj noted  Neck :supple, no JVD    01-22 @ 07:01  -  01-23 @ 07:00  --------------------------------------------------------  IN: 2210 mL / OUT: 1665 mL / NET: 545 mL    Respiratory: distant breath sounds with no rales or wheezing heard  Cardiovascular: S1, s2 present   Gastrointestinal: soft, non tender non distended and nl bs heard  Extremities: no cyanosis, edema or clubbing   Neurological: alert, follows simple commands   Skin: No rashes    LABS:                                              13.1   8.7   )-----------( 158      ( 23 Jan 2018 09:10 )             44.5   01-23    152<H>  |  118<H>  |  46<H>  ----------------------------<  248<H>  4.2   |  28  |  1.46<H>    Ca    8.8      23 Jan 2018 09:10  Phos  2.5     01-23  Mg     1.8     01-23    TPro  6.0  /  Alb  2.6<L>  /  TBili  1.2  /  DBili  x   /  AST  41<H>  /  ALT  49  /  AlkPhos  58  01-23

## 2018-01-23 NOTE — PROGRESS NOTE ADULT - PROBLEM SELECTOR PLAN 4
unknown if new or old  was loaded with amiodarone and started on maintenance but later discontinued as patient already has transaminitis.   Now on digoxin and lopressor   Heparin drip dced due to BRBPR x3  monitor CBC   Hg stable  Dr Logan unknown if new or old  was loaded with amiodarone and started on maintenance but later discontinued as patient already has transaminitis.   Now on digoxin and lopressor   Heparin drip discontinued due to multiple episodes of  BRBPR   monitor CBC   Hb stable  Dr Logan for cardio.

## 2018-01-23 NOTE — PROGRESS NOTE ADULT - ASSESSMENT
84 M with  PMH  DM, HTN, cirrhosis is BIBEMS after being found on floor in respiratory distress after 2 days, and with significant metabolic acidosis from ARF with oliguria from dehydration,  lactic acidosis and fevers. Pt admitted to MICU for Acute Hypoxic  Respiratory Failure  and shock in setting of likely sepsis vs hypovolemia. Thoughts of HRS but pt responded to fluid resuscitation with albumin and Octreotide. Pt started having good UO and kidney function started to improved, pt was transferred to [facility] for further monitor     cousin Andrew Barnes: [phone]  cousin in law Sorin Barnes [phone]

## 2018-01-23 NOTE — PROGRESS NOTE ADULT - SUBJECTIVE AND OBJECTIVE BOX
PGY1 Note discussed with supervising resident and primary attending.    Patient is a 84y old  Male who presents with a chief complaint of 'found down and missing for 2 days' (16 Jan 2018 13:55)      INTERVAL HPI/OVERNIGHT EVENTS:    MEDICATIONS  (STANDING):  ampicillin/sulbactam  IVPB 1.5 Gram(s) IV Intermittent every 12 hours  dextrose 5%. 1000 milliLiter(s) (75 mL/Hr) IV Continuous <Continuous>  digoxin  Injectable 0.125 milliGRAM(s) IV Push daily  insulin lispro (HumaLOG) corrective regimen sliding scale   SubCutaneous every 6 hours  lactobacillus acidophilus 1 Tablet(s) Oral every 8 hours  metoprolol    tartrate Injectable 2.5 milliGRAM(s) IV Push every 4 hours  pantoprazole  Injectable 40 milliGRAM(s) IV Push two times a day    MEDICATIONS  (PRN):  haloperidol    Injectable 2 milliGRAM(s) IV Push every 4 hours PRN agitation      Allergies    No Known Allergies    Intolerances        REVIEW OF SYSTEMS:  CONSTITUTIONAL: No fever, weight loss, or fatigue  RESPIRATORY: No cough, wheezing, chills or hemoptysis; No shortness of breath  CARDIOVASCULAR: No chest pain, palpitations, dizziness, or leg swelling  GASTROINTESTINAL: No abdominal or epigastric pain. No nausea, vomiting, or hematemesis; No diarrhea or constipation. No melena or hematochezia.  NEUROLOGICAL: No headaches, memory loss, loss of strength, numbness, or tremors  SKIN: No itching, burning, rashes, or lesions     Vital Signs Last 24 Hrs  T(C): 36.5 (23 Jan 2018 07:00), Max: 36.8 (23 Jan 2018 00:04)  T(F): 97.7 (23 Jan 2018 07:00), Max: 98.2 (23 Jan 2018 00:04)  HR: 87 (23 Jan 2018 07:00) (87 - 113)  BP: 143/80 (23 Jan 2018 07:00) (140/80 - 168/111)  BP(mean): 124 (22 Jan 2018 14:00) (95 - 124)  RR: 18 (23 Jan 2018 07:00) (18 - 31)  SpO2: 97% (23 Jan 2018 07:00) (94% - 99%)    PHYSICAL EXAM:  GENERAL: NAD, well-groomed, well-developed  HEAD:  Atraumatic, Normocephalic  EYES: EOMI, PERRLA, conjunctiva and sclera clear  NECK: Supple, No JVD, Normal thyroid  CHEST/LUNG: Clear to percussion bilaterally; No rales, rhonchi, wheezing, or rubs  HEART: Regular rate and rhythm; No murmurs, rubs, or gallops  ABDOMEN: Soft, Nontender, Nondistended; Bowel sounds present  NERVOUS SYSTEM:  Alert & Oriented X3, Good concentration; Motor Strength 5/5 B/L   EXTREMITIES:  2+ Peripheral Pulses, No clubbing, cyanosis, or edema  SKIN;    LABS:                        13.1   8.7   )-----------( 158      ( 23 Jan 2018 09:10 )             44.5     01-23    SEE COMMENT TYPE:(C=Critical, N=Notification, A=Abnormal) C  TESTS: NA  DATE/TIME CALLED: 01/23/18 07:54  CALLED TO: LIAM CORDOVA RN  READ BACK (2 Patient Identifiers)(Y/N): Y  READ BACK VALUES (Y/N): _Y  CALLED BY: BETH,01/23/18 07:55  POSSIBLE CONTAMINATION  |  SEE COMMENT TYPE:(C=Critical, N=Notification, A=Abnormal) C  TESTS:CL  DATE/TIME CALLED: 01/23/18 07:54  CALLED TO: LIAM CORDOVA RN  READ BACK (2 Patient Identifiers)(Y/N): Y  READ BACK VALUES (Y/N): _Y  CALLED BY: BETH01/23/18 07:55  POSSIBLE CONTAMINATION  |  SEE COMMENT TYPE:(C=Critical, N=Notification, A=Abnormal) C  TESTS: BUN  DATE/TIME CALLED: 01/23/18 07:54  CALLED TO: LIAM CORDOVA RN  READ BACK (2 Patient Identifiers)(Y/N): Y  READ BACK VALUES (Y/N): _Y  CALLED BY: BETH01/23/18 07:55  POSSIBLE CONTAMINATION  ----------------------------<  SEE COMMENT TYPE:(C=Critical, N=Notification, A=Abnormal) C  TESTS: [id number]  DATE/TIME CALLED: 01/23/18 07:54  CALLED TO: LIAM CORDOVA RN  READ BACK (2 Patient Identifiers)(Y/N): Y  READ BACK VALUES (Y/N): _Y  CALLED BY: BETH,01/23/18 07:55  POSSIBLE CONTAMINATION,  SEE COMMENT TYPE:(C=Critical, N=Notification, A=Abnormal) C  TESTS: K  DATE/TIME CALLED: 01/23/18 07:54  CALLED TO: LIAM CORDOVA RN  READ BACK (2 Patient Identifiers)(Y/N): Y  READ BACK VALUES (Y/N): _Y  CALLED BY: BETH,01/23/18 07:55  POSSIBLE CONTAMINATION   |  SEE COMMENT TYPE:(C=Critical, N=Notification, A=Abnormal) C  TESTS: CO2  DATE/TIME CALLED: 01/23/18 07:54  CALLED TO: LIAM CORDOVA RN  READ BACK (2 Patient Identifiers)(Y/N): Y  READ BACK VALUES (Y/N): _Y  CALLED BY: BETH,01/23/18 07:55  POSSIBLE CONTAMINATION  |  SEE COMMENT TYPE:(C=Critical, N=Notification, A=Abnormal) C  TESTS: CREAT  DATE/TIME CALLED: 01/23/18 07:54  CALLED TO: LIAM CORDOVA RN  READ BACK (2 Patient Identifiers)(Y/N): Y  READ BACK VALUES (Y/N): _Y  CALLED BY: BETH,01/23/18 07:55  POSSIBLE CONTAMINATION    Ca    SEE COMMENT TYPE:(C=Critical, N=Notification, A=Abnormal) C  TESTS: CA=7.6  DATE/TIME CALLED: 01/23/18 07:54  CALLED TO: LIAM CORDOVA RN  READ BACK (2 Patient Identifiers)(Y/N): Y  READ BACK VALUES (Y/N): _Y  CALLED BY: BETH,01/23/18 07:55  POSSIBLECONTAMINATION      23 Jan 2018 06:49  Phos  2.5     01-23  Mg     1.8     01-23      PT/INR - ( 23 Jan 2018 06:49 )   PT: 23.1 sec;   INR: 2.09 ratio         PTT - ( 23 Jan 2018 06:49 )  PTT:180.2 sec    CAPILLARY BLOOD GLUCOSE      POCT Blood Glucose.: 244 mg/dL (23 Jan 2018 07:51)  POCT Blood Glucose.: 241 mg/dL (23 Jan 2018 06:47)  POCT Blood Glucose.: 230 mg/dL (23 Jan 2018 01:00)  POCT Blood Glucose.: 260 mg/dL (22 Jan 2018 21:07)  POCT Blood Glucose.: 194 mg/dL (22 Jan 2018 16:51)  POCT Blood Glucose.: 267 mg/dL (22 Jan 2018 12:02)      RADIOLOGY & ADDITIONAL TESTS:    Imaging Personally Reviewed:  [ ] YES  [ ] NO    Consultant(s) Notes Reviewed:  [ ] YES  [ ] NO PGY1 Note discussed with supervising resident and primary attending.    Patient is a 84y old  Male who presents with a chief complaint of 'found down and missing for 2 days' (16 Jan 2018 13:55)      INTERVAL HPI/OVERNIGHT EVENTS: pt had two bright red bloody stool overnight. Hb this am 11, repeated Hb 13. d/cd heparin gtt.     MEDICATIONS  (STANDING):  ampicillin/sulbactam  IVPB 1.5 Gram(s) IV Intermittent every 12 hours  dextrose 5%. 1000 milliLiter(s) (75 mL/Hr) IV Continuous <Continuous>  digoxin  Injectable 0.125 milliGRAM(s) IV Push daily  insulin lispro (HumaLOG) corrective regimen sliding scale   SubCutaneous every 6 hours  lactobacillus acidophilus 1 Tablet(s) Oral every 8 hours  metoprolol    tartrate Injectable 2.5 milliGRAM(s) IV Push every 4 hours  pantoprazole  Injectable 40 milliGRAM(s) IV Push two times a day    MEDICATIONS  (PRN):  haloperidol    Injectable 2 milliGRAM(s) IV Push every 4 hours PRN agitation      Allergies    No Known Allergies    Intolerances        REVIEW OF SYSTEMS:  CONSTITUTIONAL: No fever, weight loss, or fatigue  RESPIRATORY: No cough, wheezing, chills or hemoptysis; No shortness of breath  CARDIOVASCULAR: No chest pain, palpitations, dizziness, or leg swelling  GASTROINTESTINAL: No abdominal or epigastric pain. No nausea, vomiting, or hematemesis; No diarrhea or constipation. No melena or hematochezia.  NEUROLOGICAL: No headaches, memory loss, loss of strength, numbness, or tremors  SKIN: No itching, burning, rashes, or lesions     Vital Signs Last 24 Hrs  T(C): 36.5 (23 Jan 2018 07:00), Max: 36.8 (23 Jan 2018 00:04)  T(F): 97.7 (23 Jan 2018 07:00), Max: 98.2 (23 Jan 2018 00:04)  HR: 87 (23 Jan 2018 07:00) (87 - 113)  BP: 143/80 (23 Jan 2018 07:00) (140/80 - 168/111)  BP(mean): 124 (22 Jan 2018 14:00) (95 - 124)  RR: 18 (23 Jan 2018 07:00) (18 - 31)  SpO2: 97% (23 Jan 2018 07:00) (94% - 99%)    PHYSICAL EXAM:  GENERAL: NAD, well-groomed, well-developed  HEAD:  Atraumatic, Normocephalic  EYES: EOMI, PERRLA, conjunctiva and sclera clear  NECK: Supple, No JVD, Normal thyroid  CHEST/LUNG: Clear to percussion bilaterally; No rales, rhonchi, wheezing, or rubs  HEART: Regular rate and rhythm; No murmurs, rubs, or gallops  ABDOMEN: Soft, Nontender, Nondistended; Bowel sounds present  NERVOUS SYSTEM:  Alert & Oriented X3, Good concentration; Motor Strength 5/5 B/L   EXTREMITIES:  2+ Peripheral Pulses, No clubbing, cyanosis, or edema  SKIN;    LABS:                        13.1   8.7   )-----------( 158      ( 23 Jan 2018 09:10 )             44.5     01-23    SEE COMMENT TYPE:(C=Critical, N=Notification, A=Abnormal) C  TESTS: NA  DATE/TIME CALLED: 01/23/18 07:54  CALLED TO: LIAM CORDOVA RN  READ BACK (2 Patient Identifiers)(Y/N): Y  READ BACK VALUES (Y/N): _Y  CALLED BY: BETH,01/23/18 07:55  POSSIBLE CONTAMINATION  |  SEE COMMENT TYPE:(C=Critical, N=Notification, A=Abnormal) C  TESTS:CL  DATE/TIME CALLED: 01/23/18 07:54  CALLED TO: LIAM CORDOVA RN  READ BACK (2 Patient Identifiers)(Y/N): Y  READ BACK VALUES (Y/N): _Y  CALLED BY: BETH01/23/18 07:55  POSSIBLE CONTAMINATION  |  SEE COMMENT TYPE:(C=Critical, N=Notification, A=Abnormal) C  TESTS: BUN  DATE/TIME CALLED: 01/23/18 07:54  CALLED TO: LIAM CORDOVA RN  READ BACK (2 Patient Identifiers)(Y/N): Y  READ BACK VALUES (Y/N): _Y  CALLED BY: BETH,01/23/18 07:55  POSSIBLE CONTAMINATION  ----------------------------<  SEE COMMENT TYPE:(C=Critical, N=Notification, A=Abnormal) C  TESTS: [id number]  DATE/TIME CALLED: 01/23/18 07:54  CALLED TO: LIAM CORDOVA RN  READ BACK (2 Patient Identifiers)(Y/N): Y  READ BACK VALUES (Y/N): _Y  CALLED BY: BETH,01/23/18 07:55  POSSIBLE CONTAMINATION,  SEE COMMENT TYPE:(C=Critical, N=Notification, A=Abnormal) C  TESTS: K  DATE/TIME CALLED: 01/23/18 07:54  CALLED TO: LIAM CORDOVA RN  READ BACK (2 Patient Identifiers)(Y/N): Y  READ BACK VALUES (Y/N): _Y  CALLED BY: BETH,01/23/18 07:55  POSSIBLE CONTAMINATION   |  SEE COMMENT TYPE:(C=Critical, N=Notification, A=Abnormal) C  TESTS: CO2  DATE/TIME CALLED: 01/23/18 07:54  CALLED TO: LIAM CORDOVA RN  READ BACK (2 Patient Identifiers)(Y/N): Y  READ BACK VALUES (Y/N): _Y  CALLED BY: BETH,01/23/18 07:55  POSSIBLE CONTAMINATION  |  SEE COMMENT TYPE:(C=Critical, N=Notification, A=Abnormal) C  TESTS: CREAT  DATE/TIME CALLED: 01/23/18 07:54  CALLED TO: LIAM CORDOVA RN  READ BACK (2 Patient Identifiers)(Y/N): Y  READ BACK VALUES (Y/N): _Y  CALLED BY: BETH,01/23/18 07:55  POSSIBLE CONTAMINATION    Ca    SEE COMMENT TYPE:(C=Critical, N=Notification, A=Abnormal) C  TESTS: CA=7.6  DATE/TIME CALLED: 01/23/18 07:54  CALLED TO: LIAM CORDOVA RN  READ BACK (2 Patient Identifiers)(Y/N): Y  READ BACK VALUES (Y/N): _Y  CALLED BY: BETH,01/23/18 07:55  POSSIBLECONTAMINATION      23 Jan 2018 06:49  Phos  2.5     01-23  Mg     1.8     01-23      PT/INR - ( 23 Jan 2018 06:49 )   PT: 23.1 sec;   INR: 2.09 ratio         PTT - ( 23 Jan 2018 06:49 )  PTT:180.2 sec    CAPILLARY BLOOD GLUCOSE      POCT Blood Glucose.: 244 mg/dL (23 Jan 2018 07:51)  POCT Blood Glucose.: 241 mg/dL (23 Jan 2018 06:47)  POCT Blood Glucose.: 230 mg/dL (23 Jan 2018 01:00)  POCT Blood Glucose.: 260 mg/dL (22 Jan 2018 21:07)  POCT Blood Glucose.: 194 mg/dL (22 Jan 2018 16:51)  POCT Blood Glucose.: 267 mg/dL (22 Jan 2018 12:02)      RADIOLOGY & ADDITIONAL TESTS:    Imaging Personally Reviewed:  [ ] YES  [ ] NO    Consultant(s) Notes Reviewed:  [ ] YES  [ ] NO PGY1 Note discussed with supervising resident and primary attending.    Patient is a 84y old  Male who presents with a chief complaint of 'found down and missing for 2 days' (16 Jan 2018 13:55)      INTERVAL HPI/OVERNIGHT EVENTS: pt had three episodes bright red bloody stool overnight and this am. Hb this am 11, repeated Hb 13. d/cd heparin gtt.     MEDICATIONS  (STANDING):  ampicillin/sulbactam  IVPB 1.5 Gram(s) IV Intermittent every 12 hours  dextrose 5%. 1000 milliLiter(s) (75 mL/Hr) IV Continuous <Continuous>  digoxin  Injectable 0.125 milliGRAM(s) IV Push daily  insulin lispro (HumaLOG) corrective regimen sliding scale   SubCutaneous every 6 hours  lactobacillus acidophilus 1 Tablet(s) Oral every 8 hours  metoprolol    tartrate Injectable 2.5 milliGRAM(s) IV Push every 4 hours  pantoprazole  Injectable 40 milliGRAM(s) IV Push two times a day    MEDICATIONS  (PRN):  haloperidol    Injectable 2 milliGRAM(s) IV Push every 4 hours PRN agitation      Allergies    No Known Allergies    Intolerances        Vital Signs Last 24 Hrs  T(C): 36.5 (23 Jan 2018 07:00), Max: 36.8 (23 Jan 2018 00:04)  T(F): 97.7 (23 Jan 2018 07:00), Max: 98.2 (23 Jan 2018 00:04)  HR: 87 (23 Jan 2018 07:00) (87 - 113)  BP: 143/80 (23 Jan 2018 07:00) (140/80 - 168/111)  BP(mean): 124 (22 Jan 2018 14:00) (95 - 124)  RR: 18 (23 Jan 2018 07:00) (18 - 31)  SpO2: 97% (23 Jan 2018 07:00) (94% - 99%)    PHYSICAL EXAM:  GENERAL: NAD  HEENT: dry mucosa, PERRLA  NECK: supple neck, no JVD  CVS: tachycardic, no RMG  CHEST/LUNG: CTAx2, no wheezing, no rales, no rhonchi  ABDOMEN:  soft, non tender. no distention, no rigidity   EXTREMITIES:   no edema, no cyanosis   SKIN:  no rashes, no blisters  Neuro: awake and alert      LABS:                        13.1   8.7   )-----------( 158      ( 23 Jan 2018 09:10 )             44.5     01-23    SEE COMMENT TYPE:(C=Critical, N=Notification, A=Abnormal) C  TESTS: NA  DATE/TIME CALLED: 01/23/18 07:54  CALLED TO: LIAM CORDOVA RN  READ BACK (2 Patient Identifiers)(Y/N): Y  READ BACK VALUES (Y/N): _Y  CALLED BY: BETH,01/23/18 07:55  POSSIBLE CONTAMINATION  |  SEE COMMENT TYPE:(C=Critical, N=Notification, A=Abnormal) C  TESTS:CL  DATE/TIME CALLED: 01/23/18 07:54  CALLED TO: LIAM CORDOVA RN  READ BACK (2 Patient Identifiers)(Y/N): Y  READ BACK VALUES (Y/N): _Y  CALLED BY: BETH,01/23/18 07:55  POSSIBLE CONTAMINATION  |  SEE COMMENT TYPE:(C=Critical, N=Notification, A=Abnormal) C  TESTS: BUN  DATE/TIME CALLED: 01/23/18 07:54  CALLED TO: LIAM CORDOVA RN  READ BACK (2 Patient Identifiers)(Y/N): Y  READ BACK VALUES (Y/N): _Y  CALLED BY: BETH,01/23/18 07:55  POSSIBLE CONTAMINATION  ----------------------------<  SEE COMMENT TYPE:(C=Critical, N=Notification, A=Abnormal) C  TESTS: [id number]  DATE/TIME CALLED: 01/23/18 07:54  CALLED TO: LIAM CORDOVA RN  READ BACK (2 Patient Identifiers)(Y/N): Y  READ BACK VALUES (Y/N): _Y  CALLED BY: BETH,01/23/18 07:55  POSSIBLE CONTAMINATION,  SEE COMMENT TYPE:(C=Critical, N=Notification, A=Abnormal) C  TESTS: K  DATE/TIME CALLED: 01/23/18 07:54  CALLED TO: LIAM CORDOVA RN  READ BACK (2 Patient Identifiers)(Y/N): Y  READ BACK VALUES (Y/N): _Y  CALLED BY: BETH,01/23/18 07:55  POSSIBLE CONTAMINATION   |  SEE COMMENT TYPE:(C=Critical, N=Notification, A=Abnormal) C  TESTS: CO2  DATE/TIME CALLED: 01/23/18 07:54  CALLED TO: LIAM CORDOVA RN  READ BACK (2 Patient Identifiers)(Y/N): Y  READ BACK VALUES (Y/N): _Y  CALLED BY: BETH01/23/18 07:55  POSSIBLE CONTAMINATION  |  SEE COMMENT TYPE:(C=Critical, N=Notification, A=Abnormal) C  TESTS: CREAT  DATE/TIME CALLED: 01/23/18 07:54  CALLED TO: LIAM CORDOVA RN  READ BACK (2 Patient Identifiers)(Y/N): Y  READ BACK VALUES (Y/N): _Y  CALLED BY: BETH,01/23/18 07:55  POSSIBLE CONTAMINATION    Ca    SEE COMMENT TYPE:(C=Critical, N=Notification, A=Abnormal) C  TESTS: CA=7.6  DATE/TIME CALLED: 01/23/18 07:54  CALLED TO: LIAM CORDOVA RN  READ BACK (2 Patient Identifiers)(Y/N): Y  READ BACK VALUES (Y/N): _Y  CALLED BY: BETH01/23/18 07:55  POSSIBLECONTAMINATION      23 Jan 2018 06:49  Phos  2.5     01-23  Mg     1.8     01-23      PT/INR - ( 23 Jan 2018 06:49 )   PT: 23.1 sec;   INR: 2.09 ratio         PTT - ( 23 Jan 2018 06:49 )  PTT:180.2 sec    CAPILLARY BLOOD GLUCOSE      POCT Blood Glucose.: 244 mg/dL (23 Jan 2018 07:51)  POCT Blood Glucose.: 241 mg/dL (23 Jan 2018 06:47)  POCT Blood Glucose.: 230 mg/dL (23 Jan 2018 01:00)  POCT Blood Glucose.: 260 mg/dL (22 Jan 2018 21:07)  POCT Blood Glucose.: 194 mg/dL (22 Jan 2018 16:51)  POCT Blood Glucose.: 267 mg/dL (22 Jan 2018 12:02)      RADIOLOGY & ADDITIONAL TESTS:    Imaging Personally Reviewed:  [x ] YES  [ ] NO    Consultant(s) Notes Reviewed:  [x ] YES  [ ] NO

## 2018-01-23 NOTE — PROGRESS NOTE ADULT - PROBLEM SELECTOR PLAN 7
- most likely 2/2  ATN 2/2 dehydration   - off lasix   s/p albumin and  octreotide due to concern of HRS  after volume expansion pt show response in renal function and UO  Dr: Griffin

## 2018-01-23 NOTE — PROGRESS NOTE ADULT - PROBLEM SELECTOR PLAN 6
- likely in setting of aspiration PNA, Flu  and fluid overload after resuscitation    - s/p intubation  on NC tolerating well   - f/u S/S to start diet   - c/w  abx since procalcitonin  6.13  -finish Tamiflu   - off droplet precaution

## 2018-01-23 NOTE — PROGRESS NOTE ADULT - ASSESSMENT
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  -given pt's elevated INR (cirrhosis vs. malnourishment in setting of EtOH abuse) and maroon stools pt is at high risk for exsanguination if full anticoagulation is initiated  -bleeding is likely from hemorrhoids given results of rectal exam  -would give pt Vit K 10 mg PO daily x3 days  -check CBC q12h  -if INR decreases, but pt continues to bleed consider flexible sigmoidoscopy for further evaluation

## 2018-01-23 NOTE — DISCHARGE NOTE ADULT - CARE PLAN
Principal Discharge DX:	Acute respiratory failure with hypoxia  Goal:	resolution  Assessment and plan of treatment:	You presented to the hospital unconscious, found to have respiratory failure, intubated and admitted to intensive care unit. You were diagnosed with septic shock secondary to aspiration pneumonia. You completed antibiotic course satisfactorily. Follow up with your primary care doctor within a week after discharge.  Secondary Diagnosis:	Afib  Assessment and plan of treatment:	Please continue with Digoxin and Lopressor as instructed in the medication reconciliation.  For anticoagulation, you will continue with_______________.  Follow up with your primary care doctor within a week of discharge for a routinary check up.  Secondary Diagnosis:	Cirrhosis  Assessment and plan of treatment:	You were diagnosed with liver cirrhosis. Follow up with your primary care doctor within a week of discharge. Follow up with Dr Murphy (gastroenterologist) clinic.  Secondary Diagnosis:	Diabetes  Assessment and plan of treatment:	Continue with your blood sugar medication. HbAIC was found in admission on 7.5%  .  You must maintain a healthy diet that consist of low sugar, low fat, low sodium diet.  Consider repeating your Hemoglobin A1c within 3 months after discharge to monitor your average blood glucose control. Follow up with primary care physician in one week after discharge. Principal Discharge DX:	Acute respiratory failure with hypoxia  Goal:	resolution  Assessment and plan of treatment:	You presented to the hospital unconscious, found to have respiratory failure, intubated and admitted to intensive care unit. You were diagnosed with septic shock secondary to aspiration pneumonia. You completed antibiotic course satisfactorily. Follow up with your primary care doctor within a week after discharge.  Secondary Diagnosis:	Afib  Assessment and plan of treatment:	Please continue with Digoxin and Lopressor as instructed in the medication reconciliation.  For anticoagulation, the risks and benefits were discussed and the risk of bleeding outweighs the risk of clotting.   Follow up with your primary care doctor within a week of discharge for a routinary check up.  Secondary Diagnosis:	Cirrhosis  Assessment and plan of treatment:	You were diagnosed with liver cirrhosis. Follow up with your primary care doctor within a week of discharge. Follow up with Dr Murphy (gastroenterologist) clinic.  Secondary Diagnosis:	Diabetes  Assessment and plan of treatment:	Continue with your blood sugar medication. HbAIC was found in admission on 7.5%.  You must maintain a healthy diet that consist of low sugar, low fat, low sodium diet.  Consider repeating your Hemoglobin A1c within 3 months after discharge to monitor your average blood glucose control. Follow up with primary care physician in one week after discharge.  Secondary Diagnosis:	Renal cyst  Assessment and plan of treatment:	Complex renal cyst found on imaging. Please repeat ultrasound with your primary care provider in 3 months and follow up with a nephrologist. Principal Discharge DX:	Acute respiratory failure with hypoxia  Goal:	Resolved  Assessment and plan of treatment:	You presented to the hospital unconscious, found to have respiratory failure, intubated and admitted to intensive care unit. You were diagnosed with septic shock secondary to aspiration pneumonia. You completed antibiotic course satisfactorily. Follow up with your primary care doctor within a week after discharge.  Secondary Diagnosis:	Afib  Assessment and plan of treatment:	Please continue with Digoxin and Lopressor as instructed in the medication reconciliation.  For anticoagulation, the risks and benefits were discussed and the risk of bleeding outweighs the risk of clotting because there are lesions found in the colonoscopy that can bleed.   Follow up with your primary care doctor within a week of discharge for a routinary check up.  Secondary Diagnosis:	Cirrhosis  Assessment and plan of treatment:	You were diagnosed with liver cirrhosis. Follow up with your primary care doctor within a week of discharge. Follow up with Dr Murphy (gastroenterologist) clinic.  Secondary Diagnosis:	Diabetes  Assessment and plan of treatment:	Continue with your blood sugar medication. HbAIC was found in admission on 7.5%.  You must maintain a healthy diet that consist of low sugar, low fat, low sodium diet.  Consider repeating your Hemoglobin A1c within 3 months after discharge to monitor your average blood glucose control. Follow up with primary care physician in one week after discharge.  Secondary Diagnosis:	Renal cyst  Assessment and plan of treatment:	Complex renal cyst found on imaging. Please repeat ultrasound with your primary care provider in 3 months and follow up with a nephrologist.

## 2018-01-23 NOTE — DISCHARGE NOTE ADULT - PLAN OF CARE
resolution You presented to the hospital unconscious, found to have respiratory failure, intubated and admitted to intensive care unit. You were diagnosed with septic shock secondary to aspiration pneumonia. You completed antibiotic course satisfactorily. Follow up with your primary care doctor within a week after discharge. Please continue with Digoxin and Lopressor as instructed in the medication reconciliation.  For anticoagulation, you will continue with_______________.  Follow up with your primary care doctor within a week of discharge for a routinary check up. You were diagnosed with liver cirrhosis. Follow up with your primary care doctor within a week of discharge. Follow up with Dr Murphy (gastroenterologist) clinic. Continue with your blood sugar medication. HbAIC was found in admission on 7.5%  .  You must maintain a healthy diet that consist of low sugar, low fat, low sodium diet.  Consider repeating your Hemoglobin A1c within 3 months after discharge to monitor your average blood glucose control. Follow up with primary care physician in one week after discharge. Please continue with Digoxin and Lopressor as instructed in the medication reconciliation.  For anticoagulation, the risks and benefits were discussed and the risk of bleeding outweighs the risk of clotting.   Follow up with your primary care doctor within a week of discharge for a routinary check up. Continue with your blood sugar medication. HbAIC was found in admission on 7.5%.  You must maintain a healthy diet that consist of low sugar, low fat, low sodium diet.  Consider repeating your Hemoglobin A1c within 3 months after discharge to monitor your average blood glucose control. Follow up with primary care physician in one week after discharge. Complex renal cyst found on imaging. Please repeat ultrasound with your primary care provider in 3 months and follow up with a nephrologist. Resolved Please continue with Digoxin and Lopressor as instructed in the medication reconciliation.  For anticoagulation, the risks and benefits were discussed and the risk of bleeding outweighs the risk of clotting because there are lesions found in the colonoscopy that can bleed.   Follow up with your primary care doctor within a week of discharge for a routinary check up.

## 2018-01-23 NOTE — DISCHARGE NOTE ADULT - HOSPITAL COURSE
84 M with  PMH  DM, HTN, cirrhosis is BIBEMS after being found on floor in respiratory distress after 2 days, and with significant metabolic acidosis from ARF with oliguria from dehydration,  lactic acidosis and fevers. Pt admitted to MICU for Acute Hypoxic  Respiratory Failure  and shock in setting of likely sepsis vs hypovolemia. Thoughts of HRS but pt responded to fluid resuscitation with albumin and Octreotide. Pt started having good UO and kidney function started to improved, pt was transferred to [facility] for further monitor 89 M from home alone with no known PMH was BIBEMS for respiratory distress. Hx obtained from ED provider who spoke with pt's superintendent. Pt was found on floor of bathroom cyanotic and tachypneic. Pt had not been seen for 2 days apparently. In ED, pt is alert, awake following most commands but confused and not oriented to place or date. Pt denies all ROS. Pt is febrile when admitted with leukocytosis of >20k with hemoconcentration, lactate of 16, significant renal insufficiency, given 4 L of IVF resuscitation and pt is oliguric, acidemic to 7.28 and bicarb on 13. EKG was Afib and RVR to 140s and pt given 10mg of IV cardizem.   Pt's superintendent arrived and noted that at baseline pt walks with walker and is alert oriented and communicative at baseline. They are unaware of known medical problems, but note that pt smoked heavily in past and drank with some regularity.   In ICU, pt was intubated for airway protection and hemodynamic instability.       Admitted to MICU for septic shock and acute hypoxic respiratory failure 2/2 aspiration PNA , requiring intubation.                              Given patient's improved clinical status and current hemodynamic stability, decision was made to discharge to rehab facility.  Please refer to patient's complete medical chart with documents for a full hospital course, for this is only a brief summary. 89 M from home alone with no known PMH was BIBEMS for respiratory distress. Hx obtained from ED provider who spoke with pt's superintendent. Pt was found on floor of bathroom cyanotic and tachypneic. Pt had not been seen for 2 days apparently. In ED, pt is alert, awake following most commands but confused and not oriented to place or date. Pt denies all ROS. Pt is febrile when admitted with leukocytosis of >20k with hemoconcentration, lactate of 16, significant renal insufficiency, given 4 L of IVF resuscitation and pt is oliguric, acidemic to 7.28 and bicarb on 13. EKG was Afib and RVR to 140s and pt given 10mg of IV cardizem.   Pt's superintendent arrived and noted that at baseline pt walks with walker and is alert oriented and communicative at baseline. They are unaware of known medical problems, but note that pt smoked heavily in past and drank with some regularity.   In ICU, pt was intubated for airway protection and hemodynamic instability.     Admitted to MICU for septic shock and acute hypoxic respiratory failure 2/2 aspiration PNA , requiring intubation. Patient was extubated without complication and responded well to fluid resuscitation, octreotide, and albumin but found to have severe LV dysfunction (EF 25-30% - downgraded for continued medical care on 5 south.                               Given patient's improved clinical status and current hemodynamic stability, decision was made to discharge to rehab facility.  Please refer to patient's complete medical chart with documents for a full hospital course, for this is only a brief summary. 89 M from home alone with no known PMH was BIBEMS for respiratory distress. Hx obtained from ED provider who spoke with pt's superintendent. Pt was found on floor of bathroom cyanotic and tachypneic. Pt had not been seen for 2 days apparently. In ED, pt is alert, awake following most commands but confused and not oriented to place or date. Pt denies all ROS. Pt is febrile when admitted with leukocytosis of >20k with hemoconcentration, lactate of 16, significant renal insufficiency, given 4 L of IVF resuscitation and pt is oliguric, acidemic to 7.28 and bicarb on 13. EKG was Afib and RVR to 140s and pt given 10mg of IV cardizem.   Pt's superintendent arrived and noted that at baseline pt walks with walker and is alert oriented and communicative at baseline. They are unaware of known medical problems, but note that pt smoked heavily in past and drank with some regularity.   In ICU, pt was intubated for airway protection and hemodynamic instability.     Admitted to MICU for septic shock and acute hypoxic respiratory failure 2/2 aspiration PNA , requiring intubation. Patient was extubated without complication and responded well to fluid resuscitation, octreotide, and albumin but found to have severe LV dysfunction (EF 25-30% - downgraded for continued medical care on 5 south. On the floor patient has been medically stable but not adequately alert and had poor PO intake resulting in low potassium levels that has resolved. Speech and swallow was consulted and they recommended pureed w/ thin liquids. Concern was raised about the patient's mental status including lethargic affect - panculture and CT head was negative. Spoke w/ patient's neighbors who came to visit the patient and stated that he was near his normal behavior albeit more forgetful. It was also revealed that he required assistance from others to maintain his previous lifestyle. A thorough discussion was held with the patient's relative by palliative and they agreed w/ DNR/DNI and no feeding tube.       Patient had 1 episode of fever alongside concern for lethargy. CT head negative, UA negative, benign physical exam including no signs of URI (not sending RVP), and no WBC elevation. The Atrial fibrillation with RVR, presumed new onset w/ CHADSVASC 5, 7.2% and HASBLED 4, 9%. Rate control w/ digoxin and Lopressor 25 mg BID PO for rate and rhythm control. No AC 2/2 concern for high risk of bleeding. EGD/Colonoscopy 2/1 - one gastric ulcer that healed and multiple colonic ulcers; concern for ischemic colitis and possible IBD that'd require steroids; Bx results show chronic gastritis, ulcerated colonic mucosa, etc. TA abdomen/pelvis; showed multiple findings including mild celiac stenosis but nothing for acute management. Patient had hypernatremia and rsolved w/ D5W. Transaminitis resolved; 378/113 at admission, now normal. US abdomen supports cirrhosis w/ MELD score 15. Patient to f/u w/ [facility] as outpatient in 2 weeks as per GI. Metabolic encephalopathy. Plan: BMP wnls; resolved w/ trial of Lactulose; AAOx2 person and place. [place] speaking, independently living at home prior to admission.    Given patient's improved clinical status and current hemodynamic stability, decision was made to discharge to rehab facility.  Please refer to patient's complete medical chart with documents for a full hospital course, for this is only a brief summary.

## 2018-01-23 NOTE — PROGRESS NOTE ADULT - PROBLEM SELECTOR PLAN 3
Abd US shows cirrhosis   most likely decompensated due to increase INR and FT  could also  be 2/2 hepatic congestion 2/2 HF  monitor LFT  GI: Dr. Murphy

## 2018-01-23 NOTE — PROGRESS NOTE ADULT - PROBLEM SELECTOR PLAN 1
pt had three episodes bright red bloody stool overnight and this am, not new   pt had hx of hemorrhoids   Hb this am 11, repeated Hb 13 , stable   d/cd heparin gtt   monitor CBC daily  GI Dr. Murphy

## 2018-01-23 NOTE — PROGRESS NOTE ADULT - ATTENDING COMMENTS
Patient was seen and examined by myself. Case was discussed with house staff in details. I have reviewed and agree with the plan as outlined above with edits where appropriate.  83 y/o male with  1. bleeding per rectum  2. AFIB presumed new onset  3. septic shock s/p pressors- sepsis resolved  4. liver cirrhosis with coagulopathy and abnormal LFTs  5. Acute respiratory failure s/p intubation- doing well post extubation  6. Dysphagia  7. acute renal failure with possible hepatorenal syndrome  8. Diabetes  9. Hypernatremia  10 metabolic encephalopathy  - Hold anticoagulation due to active slow bleeding;  - monitor serial CBC  - PPI  - GI consult follow up  - avoid nephrotoxins  - correcting hypernatremia; monitor sodium levels- with hypotonic solution  - Dysphagia diet without liquids for now; reassess swallowing test when patient able to participate better.  Other plan as outlined above

## 2018-01-23 NOTE — SWALLOW BEDSIDE ASSESSMENT ADULT - ORAL PREPARATORY PHASE
Refuses to accept bolus into oral cavity/Reduced oral grading/Bolus falls into left lateral sulci Reduced oral grading/Refuses to accept bolus into oral cavity Refuses to accept bolus into oral cavity/Reduced oral grading

## 2018-01-23 NOTE — DISCHARGE NOTE ADULT - PATIENT PORTAL LINK FT
“You can access the [employer] Patient Portal, offered by [facility], by registering with the following website: [email or web address]”

## 2018-01-23 NOTE — SWALLOW BEDSIDE ASSESSMENT ADULT - COMMENTS
Pt awake & alert. Open mouth posture at rest. Mouth breather. Did not follow directions. Did not talk. Grunted a couple of times. First bolus, pt held in mouth. SLP cleaned out bolus w/oral swab & a swallow elicited. Second bolus, pt spat out bolus onto shirt. Pt spat out bolus onto tissue.

## 2018-01-23 NOTE — SWALLOW BEDSIDE ASSESSMENT ADULT - ORAL PHASE
Decreased anterior-posterior movement of the bolus/Delayed oral transit time/Lingual stasis Decreased anterior-posterior movement of the bolus/Lingual stasis/Delayed oral transit time

## 2018-01-23 NOTE — PROGRESS NOTE ADULT - SUBJECTIVE AND OBJECTIVE BOX
Rectal bleeding. Pt unable to answer any questions    T(C): 36.3 (01-23-18 @ 15:25)  T(F): 97.3 (01-23-18 @ 15:25), Max: 98.2 (01-23-18 @ 00:04)  HR: 112 (01-23-18 @ 15:25) (87 - 112)  BP: 168/75 (01-23-18 @ 15:25) (143/80 - 168/96)    Gen: responds to tactile stimuli, but no to verbal commands; A&O x0  CVS: S1/S2  Chest: coarse BS B/L  Abd: S/NT/ND  Rectal: maroon stools; internal hemorrhoids present; no masses appreciated                        12.2   7.5   )-----------( 144      ( 23 Jan 2018 16:06 )             39.4   01-23    152<H>  |  118<H>  |  43<H>  ----------------------------<  223<H>  3.9   |  27  |  1.34<H>    Ca    8.7      23 Jan 2018 16:06  Phos  2.5     01-23  Mg     1.8     01-23    TPro  6.0  /  Alb  2.6<L>  /  TBili  1.2  /  DBili  x   /  AST  41<H>  /  ALT  49  /  AlkPhos  58  01-23

## 2018-01-23 NOTE — DISCHARGE NOTE ADULT - CARE PROVIDER_API CALL
Donald Logan), Cardiovascular Disease  [address]  [place] [address]  Phone: [phone]  Fax: [phone]    Mich Murphy), Internal Medicine  [address] [place] [address]  Phone: [phone]  Fax: [phone]

## 2018-01-23 NOTE — DISCHARGE NOTE ADULT - ABILITY TO HEAR (WITH HEARING AID OR HEARING APPLIANCE IF NORMALLY USED):
Unable to assess hearing/intubated and sedated Adequate: hears normal conversation without difficulty

## 2018-01-24 NOTE — PROGRESS NOTE ADULT - ASSESSMENT
89 male from home, found on floor, +ALONDRA, dehydration, afib with RVR, encephalopathy was fluid resuscitated in ED, developed acute respiratory failure secondary to pulmonary edema s/p ICU stay now on telemetry floor.     1. ALONDRA: R/O Hepato-renal syndrome: patient with liver cirrhosis and urine Na<5,on Albumin plus octreotide, now renal function is improving with good U/O   - Cr is normal (1.4 today); since patient was transferred 5N to 5S, documentation of urinary output is not complete. 200ml so far today.   - keep SBP>110  - Keep patient euvolemic and renal diet  - Avoid Nephrotoxic Meds/ Agents such as (NSAIDs, IV contrast, Aminoglycosides such as gentamicin, Gadolinium contrast, Phosphate containing enemas, etc..)  - Adjust Medications according to eGFR  - f/u bmp daily    2. Hypernatremia.  -Na level improving 152-->151  -f/u Na level daily   -continue hypotonic iv hydration as ordered    3. Hyperkalemia.   - resolved   - secondary to alondra  - keep k >4 and <5    4. Hyperphosphatemia:   - resolved     5. R/o CKD: r/o stage 3  with serum cr around 1.4 as per pcp , secondary to htn?, now pts renal function is around baseline  - Keep patient euvolemic and renal diet  - Avoid Nephrotoxic Meds/ Agents such as (NSAIDs, IV contrast, Aminoglycosides such as gentamicin, -Gadolinium contrast, Phosphate containing enemas, etc..)  - Adjust Medications according to eGFR  - f/u bmp daily  - needs out-patient renal f/u with Dr Muse in 2 weeks post discharge    6. Renal cysts: rt cyst is complex  - suggest to repeat renal sono in 3 months as outpatient 89 male from home, found on floor, +ALONDRA, dehydration, afib with RVR, encephalopathy was fluid resuscitated in ED, developed acute respiratory failure secondary to pulmonary edema s/p ICU stay now on telemetry floor.     1. ALONDRA: R/O Hepato-renal syndrome: patient with liver cirrhosis and urine Na<5,on Albumin plus octreotide, now renal function is improving with good U/O   - Cr is normal (1.22 today); since patient was transferred 5N to 5S, documentation of urinary output is not complete. 200ml so far today.   - keep SBP>110  - Keep patient euvolemic and renal diet  - Avoid Nephrotoxic Meds/ Agents such as (NSAIDs, IV contrast, Aminoglycosides such as gentamicin, Gadolinium contrast, Phosphate containing enemas, etc..)  - Adjust Medications according to eGFR  - f/u bmp daily    2. Hypernatremia.  -Na level improving 152-->151  -f/u Na level daily   -continue hypotonic iv hydration as ordered    3. Hyperkalemia.   - resolved   - secondary to alondra  - keep k >4 and <5    4. Hyperphosphatemia:   - resolved     5. R/o CKD: r/o stage 3  with serum cr around 1.4 as per pcp , secondary to htn?, now pts renal function is around baseline  - Keep patient euvolemic and renal diet  - Avoid Nephrotoxic Meds/ Agents such as (NSAIDs, IV contrast, Aminoglycosides such as gentamicin, -Gadolinium contrast, Phosphate containing enemas, etc..)  - Adjust Medications according to eGFR  - f/u bmp daily  - needs out-patient renal f/u with Dr Muse in 2 weeks post discharge    6. Renal cysts: rt cyst is complex  - suggest to repeat renal sono in 3 months as outpatient

## 2018-01-24 NOTE — PROGRESS NOTE ADULT - SUBJECTIVE AND OBJECTIVE BOX
SUBJECTIVE:  Patient is alert and awake, denies any chest pain, abdominal pain, N/V/D or other complaints         MEDICATIONS  (STANDING):  ampicillin/sulbactam  IVPB 1.5 Gram(s) IV Intermittent every 12 hours  dextrose 5%. 1000 milliLiter(s) (75 mL/Hr) IV Continuous <Continuous>  digoxin     Tablet 0.125 milliGRAM(s) Oral daily  insulin glargine Injectable (LANTUS) 8 Unit(s) SubCutaneous at bedtime  insulin lispro (HumaLOG) corrective regimen sliding scale   SubCutaneous every 6 hours  lactobacillus acidophilus 1 Tablet(s) Oral every 8 hours  metoprolol succinate ER 25 milliGRAM(s) Oral daily  pantoprazole  Injectable 40 milliGRAM(s) IV Push two times a day  phytonadione   Solution 10 milliGRAM(s) Oral daily    MEDICATIONS  (PRN):  haloperidol    Injectable 2 milliGRAM(s) IV Push every 4 hours PRN agitation      Vital Signs  Vital Signs Last 24 Hrs  T(C): 36.7 (24 Jan 2018 10:37), Max: 36.7 (24 Jan 2018 10:37)  T(F): 98 (24 Jan 2018 10:37), Max: 98 (24 Jan 2018 10:37)  HR: 94 (24 Jan 2018 10:37) (87 - 112)  BP: 149/83 (24 Jan 2018 10:37) (127/81 - 168/75)  BP(mean): 100 (23 Jan 2018 23:30) (100 - 100)  RR: 19 (24 Jan 2018 10:37) (17 - 20)  SpO2: 100% (24 Jan 2018 10:37) (96% - 100%)      01-23 @ 07:01  -  01-24 @ 07:00  --------------------------------------------------------  IN: 500 mL / OUT: 1600 mL / NET: -1100 mL              PHYSICAL EXAM:  Constitutional: well developed, well nourished  and in nad  Eyes: non icteric sclera seen and mild pallor of the conj noted  Neck :supple, no JVD  Respiratory: CTAB, off nasal cannula   Cardiovascular: S1, s2 present   Gastrointestinal: soft, non tender non distended and nl bs heard  Extremities: no cyanosis, edema or clubbing   Neurological: alert, follows simple commands   Skin: No rashes    LABS:                                                                12.6   8.2   )-----------( 159      ( 24 Jan 2018 08:13 )             42.4   01-24    151<H>  |  117<H>  |  34<H>  ----------------------------<  210<H>  3.7   |  27  |  1.22    Ca    8.7      24 Jan 2018 08:13  Phos  2.4     01-24  Mg     1.8     01-24    TPro  5.9<L>  /  Alb  2.5<L>  /  TBili  1.1  /  DBili  x   /  AST  53<H>  /  ALT  53  /  AlkPhos  71  01-24

## 2018-01-24 NOTE — PROGRESS NOTE ADULT - SUBJECTIVE AND OBJECTIVE BOX
PGY1 Note discussed with supervising resident and primary attending.    Patient is a 84y old  Male who presents with a chief complaint of 'found down and missing for 2 days' (23 Jan 2018 15:10)      INTERVAL HPI/OVERNIGHT EVENTS: Pt had one episode of Maroon stool this am.     MEDICATIONS  (STANDING):  ampicillin/sulbactam  IVPB 1.5 Gram(s) IV Intermittent every 12 hours  dextrose 5%. 1000 milliLiter(s) (75 mL/Hr) IV Continuous <Continuous>  digoxin     Tablet 0.125 milliGRAM(s) Oral daily  insulin glargine Injectable (LANTUS) 8 Unit(s) SubCutaneous at bedtime  insulin lispro (HumaLOG) corrective regimen sliding scale   SubCutaneous every 6 hours  lactobacillus acidophilus 1 Tablet(s) Oral every 8 hours  metoprolol    tartrate Injectable 2.5 milliGRAM(s) IV Push every 4 hours  pantoprazole  Injectable 40 milliGRAM(s) IV Push two times a day  phytonadione   Solution 10 milliGRAM(s) Oral daily    MEDICATIONS  (PRN):  haloperidol    Injectable 2 milliGRAM(s) IV Push every 4 hours PRN agitation      Allergies    No Known Allergies    Intolerances      Vital Signs Last 24 Hrs  T(C): 36.6 (24 Jan 2018 07:00), Max: 36.7 (23 Jan 2018 11:00)  T(F): 97.8 (24 Jan 2018 07:00), Max: 98 (23 Jan 2018 11:00)  HR: 89 (24 Jan 2018 07:00) (87 - 112)  BP: 159/92 (24 Jan 2018 07:00) (127/81 - 168/96)  BP(mean): 100 (23 Jan 2018 23:30) (100 - 100)  RR: 18 (24 Jan 2018 07:00) (17 - 20)  SpO2: 97% (24 Jan 2018 07:00) (96% - 100%)    PHYSICAL EXAM:  GENERAL: NAD  HEENT: dry mucosa, PERRLA  NECK: supple neck, no JVD  CVS: tachycardic, no RMG  CHEST/LUNG: CTAx2, no wheezing, no rales, no rhonchi  ABDOMEN:  soft, non tender. no distention, no rigidity   EXTREMITIES:   no edema, no cyanosis   SKIN:  no rashes, no blisters  Neuro: awake and alert      LABS:                        12.2   7.5   )-----------( 144      ( 23 Jan 2018 16:06 )             39.4     01-23    152<H>  |  118<H>  |  43<H>  ----------------------------<  223<H>  3.9   |  27  |  1.34<H>    Ca    8.7      23 Jan 2018 16:06  Phos  2.5     01-23  Mg     1.8     01-23    TPro  6.0  /  Alb  2.6<L>  /  TBili  1.2  /  DBili  x   /  AST  41<H>  /  ALT  49  /  AlkPhos  58  01-23    PT/INR - ( 23 Jan 2018 06:49 )   PT: 23.1 sec;   INR: 2.09 ratio         PTT - ( 23 Jan 2018 06:49 )  PTT:180.2 sec    CAPILLARY BLOOD GLUCOSE      POCT Blood Glucose.: 176 mg/dL (24 Jan 2018 07:41)  POCT Blood Glucose.: 184 mg/dL (24 Jan 2018 05:15)  POCT Blood Glucose.: 240 mg/dL (23 Jan 2018 22:10)  POCT Blood Glucose.: 246 mg/dL (23 Jan 2018 11:23)      RADIOLOGY & ADDITIONAL TESTS:    Imaging Personally Reviewed:  [ x] YES  [ ] NO    Consultant(s) Notes Reviewed:  [x ] YES  [ ] NO

## 2018-01-24 NOTE — PROGRESS NOTE ADULT - ATTENDING COMMENTS
Patient seen and examined, agree with above assessment and plan as transcribed above.    - GI f/u noted, high risk for bleeding  - AC held  - ? If bleeding risk would be acceptable on  QD    Donald Logan MD, [facility]  [facility], [facility]  [address] [place] [address]  PHONE:  [phone]  BEEPER : [phone] Patient seen and examined, agree with above assessment and plan as transcribed above.    - GI f/u noted, high risk for bleeding  - AC held  - ? If bleeding risk would be acceptable on  QD  - Change IV lopressor to toprol XL 25 mg PO daily  - Plan to start Low dose ACE for medical management of his cardiomyopathy  - NOT A CANDIDATE FOR ISCHEMIC EVAL      Donald Logan MD, FACC  [facility], [facility]  [address] [place] [address]  PHONE:  [phone]  BEEPER : [phone]

## 2018-01-24 NOTE — PROGRESS NOTE ADULT - ASSESSMENT
84 M with  PMH  DM, HTN, cirrhosis is BIBEMS after being found on floor in respiratory distress after 2 days, and with significant metabolic acidosis from ARF with oliguria from dehydration,  lactic acidosis and fevers. Pt admitted to MICU for Acute Hypoxic  Respiratory Failure  and shock in setting of likely sepsis vs hypovolemia. Thoughts of HRS but pt responded to fluid resuscitation with albumin and Octreotide. Pt started having good UO and kidney function started to improved, pt was transferred to [facility] for further monitor     cousin Andrew Barnes: [phone]  cousin in law Sorin Barnes [phone]

## 2018-01-24 NOTE — CONSULT NOTE ADULT - PROBLEM SELECTOR RECOMMENDATION 3
-r/o secondary to saline infusion, less likley hypovolemic  -f/u Na level q 12hrs
repeat s/s eval pending; previous evaluation was indeterminate; patient on dysphagia diet; Alb 2.5

## 2018-01-24 NOTE — PROGRESS NOTE ADULT - PROBLEM SELECTOR PLAN 4
unknown if new or old  was loaded with amiodarone and started on maintenance but later discontinued as patient already has transaminitis.   Now on digoxin and lopressor   Heparin drip discontinued due to multiple episodes of  maroon stool  A/C once cleared by GI - heparin gtt ( afib and risk for CVA)   monitor CBC   Hb stable  Dr Logan for cardio.

## 2018-01-24 NOTE — CONSULT NOTE ADULT - SUBJECTIVE AND OBJECTIVE BOX
89 M from home alone with no known PMH was BIBEMS for respiratory distress, admitted to ICU for acute hypoxic respiratory failure and intubated in the ICU. Abdominal US revealed liver cirrhosis. Now extubated, downgraded to the medical floor. He remains encephalopathic, confused.  His cousin Layne and cousin in law Sorin are his closest relatives.  S/P s/s evaluation yesterday and they were unable to recommend diet at that time.     PAST MEDICAL & SURGICAL HISTORY: Unknown    SOCIAL HISTORY:    Admitted from:  home, lived alone  Substance abuse history:              Tobacco hx:  Past use                Alcohol hx: Past use, per family             Home Opioid hx: Unknown  Holiness:   Unknown                                 Preferred Language: [place], English    Surrogate/HCP/Guardian: Sorin Cameron [phone], Layne Cameron [phone]               FAMILY HISTORY:    Baseline ADLs (prior to admission): Independent    Allergies:  No Known Allergies          Review of Systems: Unable to obtain due to poor mentation    MEDICATIONS  (STANDING):  ampicillin/sulbactam  IVPB 1.5 Gram(s) IV Intermittent every 12 hours  dextrose 5%. 1000 milliLiter(s) (75 mL/Hr) IV Continuous <Continuous>  digoxin     Tablet 0.125 milliGRAM(s) Oral daily  insulin glargine Injectable (LANTUS) 8 Unit(s) SubCutaneous at bedtime  insulin lispro (HumaLOG) corrective regimen sliding scale   SubCutaneous every 6 hours  lactobacillus acidophilus 1 Tablet(s) Oral every 8 hours  metoprolol    tartrate Injectable 2.5 milliGRAM(s) IV Push every 4 hours  pantoprazole  Injectable 40 milliGRAM(s) IV Push two times a day  phytonadione   Solution 10 milliGRAM(s) Oral daily    MEDICATIONS  (PRN):  haloperidol    Injectable 2 milliGRAM(s) IV Push every 4 hours PRN agitation    PHYSICAL EXAM:    Vital Signs Last 24 Hrs  T(C): 36.8 (24 Jan 2018 14:33), Max: 36.8 (24 Jan 2018 14:33)  T(F): 98.2 (24 Jan 2018 14:33), Max: 98.2 (24 Jan 2018 14:33)  HR: 99 (24 Jan 2018 14:33) (87 - 99)  BP: 146/67 (24 Jan 2018 14:33) (127/81 - 159/92)  BP(mean): 100 (23 Jan 2018 23:30) (100 - 100)  RR: 18 (24 Jan 2018 14:33) (17 - 19)  SpO2: 100% (24 Jan 2018 14:33) (96% - 100%)    General: alert  oriented x __1__ verbal  Karnofsky Performance Score/Palliative Performance Status Version2: 30%    HEENT: normal    Lungs: comfortable  CV: normal  GI: Incontinent   : Incontinent  Musculoskeletal: Weakness  Skin: normal    Neuro: cognitive impairment  Oral intake ability: Minimal  Diet: Dysphagia    LABS:                        12.6   8.2   )-----------( 159      ( 24 Jan 2018 08:13 )             42.4     01-24    151<H>  |  117<H>  |  34<H>  ----------------------------<  210<H>  3.7   |  27  |  1.22    Ca    8.7      24 Jan 2018 08:13  Phos  2.4     01-24  Mg     1.8     01-24    TPro  5.9<L>  /  Alb  2.5<L>  /  TBili  1.1  /  DBili  x   /  AST  53<H>  /  ALT  53  /  AlkPhos  71  01-24    ADVANCE DIRECTIVES: Full Code

## 2018-01-24 NOTE — PROGRESS NOTE ADULT - SUBJECTIVE AND OBJECTIVE BOX
pt seen and examined, no complaints on exam.   pt agitated, confused as per staff. pt presently calm on exam  NAD on exam     ampicillin/sulbactam  IVPB 1.5 Gram(s) IV Intermittent every 12 hours  dextrose 5%. 1000 milliLiter(s) IV Continuous <Continuous>  digoxin     Tablet 0.125 milliGRAM(s) Oral daily  haloperidol    Injectable 2 milliGRAM(s) IV Push every 4 hours PRN  insulin glargine Injectable (LANTUS) 8 Unit(s) SubCutaneous at bedtime  insulin lispro (HumaLOG) corrective regimen sliding scale   SubCutaneous every 6 hours  lactobacillus acidophilus 1 Tablet(s) Oral every 8 hours  metoprolol    tartrate Injectable 2.5 milliGRAM(s) IV Push every 4 hours  pantoprazole  Injectable 40 milliGRAM(s) IV Push two times a day  phytonadione   Solution 10 milliGRAM(s) Oral daily                            12.2   7.5   )-----------( 144      ( 23 Jan 2018 16:06 )             39.4       Hemoglobin: 12.2 g/dL (01-23 @ 16:06)  Hemoglobin: 13.1 g/dL (01-23 @ 09:10)  Hemoglobin: 11.2 g/dL (01-23 @ 06:49)  Hemoglobin: 13.8 g/dL (01-22 @ 17:56)  Hemoglobin: 12.9 g/dL (01-22 @ 06:47)      01-23    152<H>  |  118<H>  |  43<H>  ----------------------------<  223<H>  3.9   |  27  |  1.34<H>    Ca    8.7      23 Jan 2018 16:06  Phos  2.5     01-23  Mg     1.8     01-23    TPro  6.0  /  Alb  2.6<L>  /  TBili  1.2  /  DBili  x   /  AST  41<H>  /  ALT  49  /  AlkPhos  58  01-23    Creatinine Trend: 1.34<--, 1.46<--, SEE COMMENT TYPE:(C=Critical, N=Notification, A=Abnormal) C  TESTS: CREAT  DATE/TIME CALLED: 01/23/18 07:54  CALLED TO: LIAM CORDOVA RN  READ BACK (2 Patient Identifiers)(Y/N): Y  READ BACK VALUES (Y/N): _Y  CALLED BY: BETH,01/23/18 07:55  POSSIBLE CONTAMINATION<--, 1.62<--, 1.97<--, 2.09<--    COAGS:           T(C): 36.5 (01-24-18 @ 04:55), Max: 36.7 (01-23-18 @ 11:00)  HR: 96 (01-24-18 @ 04:55) (87 - 112)  BP: 138/68 (01-24-18 @ 04:55) (127/81 - 168/96)  RR: 18 (01-24-18 @ 04:55) (17 - 20)  SpO2: 97% (01-24-18 @ 04:55) (96% - 100%)  Wt(kg): --    I&O's Summary    22 Jan 2018 07:01  -  23 Jan 2018 07:00  --------------------------------------------------------  IN: 2210 mL / OUT: 1665 mL / NET: 545 mL    23 Jan 2018 07:01  -  24 Jan 2018 05:05  --------------------------------------------------------  IN: 500 mL / OUT: 1000 mL / NET: -500 mL         HEENT:   Normal oral mucosa,   Lymphatic: No obvious lymphadenopathy , no edema  Cardiovascular: Normal S1 S2, No JVD, 1/6 PEDRO murmur, Peripheral pulses palpable 2+ bilaterally  Respiratory: Coarse mechanical BS, normal effort 	  Gastrointestinal:  Soft, Non-tender, + BS	  Skin: No rashes,  No cyanosis, warm to touch  Musculoskeletal: unable to fully assess  Psychiatry:  Unable to asses Mood & affect     TELEMETRY: 	afib    ECHO: < from: Transthoracic Echocardiogram (01.17.18 @ 14:22) >  CONCLUSIONS:  1. Normal mitral valve. Mild to moderate mitral  regurgitation.  2. Aortic valve not seen well.  3. Aortic Root: 3.1 cm.  4. Mild left atrial enlargement.  5. Normal left ventricular internal dimensions and wall  thicknesses.  6. Endocardium not well visualized; grossly severely  reduced l left ventricular systolic function.  7. Grade II diastolic dysfunction.  8. Normal right atrium.  9. Normal right ventricular size and function.  10. RA Pressure is 8 mm Hg.  11. RV systolic pressure is 33 mm Hg.  12. Normal tricuspid valve.  13. Normal pulmonic valve.  14. Normal pericardium with no pericardial effusion.    < end of copied text >      ASSESSMENT/PLAN: 	89y Male ? cirrhosis found down by his superintendant,  admitted with respiratory failure new rapid afib, shock, found with severe LV dyfx  of unknown duration.    cont rate control with Dig ( s/p load yesterday ) , BB  trend INR- vit K 10  ordered ( day 2 of 3 dose )   trend h/h  Heparin gtt if not contra indicated  A/C once cleared by GI - heparin gtt ( afib and risk for CVA)    GI / DVT prophylaxis.  renal follow up ,    keep K>4, mag >2.0  D/W Dr Logan pt seen and examined, no complaints on exam.   pt agitated, confused as per staff. pt presently calm on exam  NAD on exam     ampicillin/sulbactam  IVPB 1.5 Gram(s) IV Intermittent every 12 hours  dextrose 5%. 1000 milliLiter(s) IV Continuous <Continuous>  digoxin     Tablet 0.125 milliGRAM(s) Oral daily  haloperidol    Injectable 2 milliGRAM(s) IV Push every 4 hours PRN  insulin glargine Injectable (LANTUS) 8 Unit(s) SubCutaneous at bedtime  insulin lispro (HumaLOG) corrective regimen sliding scale   SubCutaneous every 6 hours  lactobacillus acidophilus 1 Tablet(s) Oral every 8 hours  metoprolol    tartrate Injectable 2.5 milliGRAM(s) IV Push every 4 hours  pantoprazole  Injectable 40 milliGRAM(s) IV Push two times a day  phytonadione   Solution 10 milliGRAM(s) Oral daily                            12.2   7.5   )-----------( 144      ( 23 Jan 2018 16:06 )             39.4       Hemoglobin: 12.2 g/dL (01-23 @ 16:06)  Hemoglobin: 13.1 g/dL (01-23 @ 09:10)  Hemoglobin: 11.2 g/dL (01-23 @ 06:49)  Hemoglobin: 13.8 g/dL (01-22 @ 17:56)  Hemoglobin: 12.9 g/dL (01-22 @ 06:47)      01-23    152<H>  |  118<H>  |  43<H>  ----------------------------<  223<H>  3.9   |  27  |  1.34<H>    Ca    8.7      23 Jan 2018 16:06  Phos  2.5     01-23  Mg     1.8     01-23    TPro  6.0  /  Alb  2.6<L>  /  TBili  1.2  /  DBili  x   /  AST  41<H>  /  ALT  49  /  AlkPhos  58  01-23    Creatinine Trend: 1.34<--, 1.46<--, SEE COMMENT TYPE:(C=Critical, N=Notification, A=Abnormal) C  TESTS: CREAT  DATE/TIME CALLED: 01/23/18 07:54  CALLED TO: LIAM CORDOVA RN  READ BACK (2 Patient Identifiers)(Y/N): Y  READ BACK VALUES (Y/N): _Y  CALLED BY: BETH,01/23/18 07:55  POSSIBLE CONTAMINATION<--, 1.62<--, 1.97<--, 2.09<--    COAGS:           T(C): 36.5 (01-24-18 @ 04:55), Max: 36.7 (01-23-18 @ 11:00)  HR: 96 (01-24-18 @ 04:55) (87 - 112)  BP: 138/68 (01-24-18 @ 04:55) (127/81 - 168/96)  RR: 18 (01-24-18 @ 04:55) (17 - 20)  SpO2: 97% (01-24-18 @ 04:55) (96% - 100%)  Wt(kg): --    I&O's Summary    22 Jan 2018 07:01  -  23 Jan 2018 07:00  --------------------------------------------------------  IN: 2210 mL / OUT: 1665 mL / NET: 545 mL    23 Jan 2018 07:01  -  24 Jan 2018 05:05  --------------------------------------------------------  IN: 500 mL / OUT: 1000 mL / NET: -500 mL         HEENT:   Normal oral mucosa,   Lymphatic: No obvious lymphadenopathy , no edema  Cardiovascular: Normal S1 S2, No JVD, 1/6 PEDRO murmur, Peripheral pulses palpable 2+ bilaterally  Respiratory: Coarse mechanical BS, normal effort 	  Gastrointestinal:  Soft, Non-tender, + BS	  Skin: No rashes,  No cyanosis, warm to touch  Musculoskeletal: unable to fully assess  Psychiatry:  Confused      ECHO: < from: Transthoracic Echocardiogram (01.17.18 @ 14:22) >  CONCLUSIONS:  1. Normal mitral valve. Mild to moderate mitral  regurgitation.  2. Aortic valve not seen well.  3. Aortic Root: 3.1 cm.  4. Mild left atrial enlargement.  5. Normal left ventricular internal dimensions and wall  thicknesses.  6. Endocardium not well visualized; grossly severely  reduced l left ventricular systolic function.  7. Grade II diastolic dysfunction.  8. Normal right atrium.  9. Normal right ventricular size and function.  10. RA Pressure is 8 mm Hg.  11. RV systolic pressure is 33 mm Hg.  12. Normal tricuspid valve.  13. Normal pulmonic valve.  14. Normal pericardium with no pericardial effusion.    < end of copied text >      ASSESSMENT/PLAN: 	89y Male ? cirrhosis found down by his superintendant,  admitted with respiratory failure new rapid afib, shock, found with severe LV dyfx  of unknown duration.    cont rate control with Dig ( s/p load yesterday ) , BB  trend INR- vit K 10  ordered ( day 2 of 3 dose )   trend h/h  A/C once cleared by GI - heparin gtt ( afib and risk for CVA)    GI / DVT prophylaxis.  renal follow up ,    keep K>4, mag >2.0  D/W Dr Logan

## 2018-01-24 NOTE — CONSULT NOTE ADULT - PROBLEM SELECTOR RECOMMENDATION 9
MELD score 15, GI on the case; likely secondary to past ETOH use, per family
r/o secondary to ATN (hypotension/septic shock) , less scarlettley pre renal azotemia ,secondary to sepsis/hypovolemia, pt with poor U/O despite 4 liters of saline given from ER  -suggest to add gentle iv hydration if bp low with saline if Na level <145 or add 1/2 saline if Na level >145 at 100 ml per hr  -keep sbp>110  -order renal sono in am at bedside  -Keep patient euvolemic and renal diet  -Avoid Nephrotoxic Meds/ Agents such as (NSAIDs, IV contrast, Aminoglycosides such as gentamicin, -Gadolinium contrast, Phosphate containing enemas, etc..)  -Adjust Medications according to eGFR  -f/u bmp q 6 hrs

## 2018-01-24 NOTE — PROGRESS NOTE ADULT - PROBLEM SELECTOR PLAN 5
- bacteremia w/ Gemella -->on Unasyn (total 10 days)  - hypovolemic shock was also in  differential   -off pressors with good BP   -repeat Bcs negative - resolving  - complete 10 day course of antibiotics

## 2018-01-24 NOTE — PROGRESS NOTE ADULT - PROBLEM SELECTOR PLAN 3
Abd US shows cirrhosis   most likely decompensated due to increase INR and FT  could also  be 2/2 hepatic congestion 2/2 HF  monitor LFT  GI: Dr. Murphy Abd US shows cirrhosis   most likely decompensated due to increase INR and FT  could also  be 2/2 hepatic congestion 2/2 HF  monitor LFT  pt need follow up with [facility] transplant center within in 2 weeks for Liver transplant after discharge  GI: Dr. Murphy

## 2018-01-24 NOTE — CONSULT NOTE ADULT - PROBLEM SELECTOR RECOMMENDATION 4
-plan as per micu/ID
Spoke with his cousin and cousin in law Sorin and Layne Cameron over the phone; they are his closest relatives; he does not have a HCP.  They want to speak with the primary medical attending before making any further decisions.  Discussed risks versus benefit of artificial life support and CPR. They believe he has a living will in his apartment, but they are having difficulty accessing apartment.  Ongoing goals of care discussions will be necessary.  A MOLST prior to d/c would be appropriate. Patient will likely need VENUS placement after d/c.  L/M for Dr. Schuler to call family. Will f/u with family tomorrow for ongoing goals of care discussions.

## 2018-01-24 NOTE — PROGRESS NOTE ADULT - ATTENDING COMMENTS
Patient was seen and examined by myself. Case was discussed with house staff in details. I have reviewed and agree with the plan as outlined above with edits where appropriate.    Patient was seen and examined by myself. Case was discussed with house staff in details. I have reviewed and agree with the plan as outlined above with edits where appropriate.  85 y/o male with  1. bleeding per rectum- resolving  2. AFIB presumed new onset- rate controlled  3. septic shock s/p pressors- sepsis resolved  4. liver cirrhosis with coagulopathy and abnormal LFTs  5. Acute respiratory failure s/p intubation- doing well post extubation  6. Dysphagia  7. acute renal failure with possible hepatorenal syndrome  8. Diabetes-  monitor f/s  9. Hypernatremia- resolving  10 metabolic encephalopathy  - Hold anticoagulation due to active slow bleeding;  - monitor serial CBC  - PPI  - GI consult follow up  - avoid nephrotoxins  - correcting hypernatremia; monitor sodium levels- with hypotonic solution  - Dysphagia diet without liquids for now; reassess swallowing test when patient able to participate better.  Other plan as outlined above . Patient was seen and examined by myself. Case was discussed with house staff in details. I have reviewed and agree with the plan as outlined above with edits where appropriate.    Patient was seen and examined by myself. Case was discussed with house staff in details. I have reviewed and agree with the plan as outlined above with edits where appropriate.  85 y/o male with  1. bleeding per rectum- resolving  2. AFIB presumed new onset- rate controlled  3. septic shock s/p pressors- sepsis resolved  4. liver cirrhosis with coagulopathy and abnormal LFTs  5. Acute respiratory failure s/p intubation- doing well post extubation  6. Dysphagia- still with difficulty swallowing; speech therapy exercises as tolerated  7. acute renal failure with possible hepatorenal syndrome- renal functions stabilizing; monitor and avoid nephrotoxins  8. Diabetes-  monitor f/s; continue with insulin  9. Hypernatremia- resolving  10 metabolic encephalopathy- resolving  - Hold anticoagulation due to active slow bleeding;  - monitor serial CBC  - PPI  - correcting hypernatremia; monitor sodium levels- with hypotonic solution  - Dysphagia diet without liquids for now; reassess swallowing test when patient able to participate better.  Other plan as outlined above .  Overall prognosis is guarded. Palliative team consulted

## 2018-01-24 NOTE — CONSULT NOTE ADULT - PROBLEM SELECTOR RECOMMENDATION 2
Multifactorial like sepsis induced  lactic acidosis plus aliza   -suggest to keep PH >7.2   -may add iv Nahco3 prn to keep ph >7.2
PT eval to evaluate; prior to hospitalization, patient was independent, lived alone; now requiring assistance with all ADLs

## 2018-01-25 NOTE — PROGRESS NOTE ADULT - ASSESSMENT
89 male from home, found on floor, +ALONDRA, dehydration, afib with RVR, encephalopathy was fluid resuscitated in ED, developed acute respiratory failure secondary to pulmonary edema s/p ICU stay now on telemetry floor.     1. ALONDRA: R/O Hepato-renal syndrome: patient with liver cirrhosis and urine Na<5,on Albumin plus octreotide, now renal function is improving with good U/O   - Cr is normal (1.08 today); net 300ml neg.   - keep SBP>110  - Keep patient euvolemic and renal diet  - Avoid Nephrotoxic Meds/ Agents such as (NSAIDs, IV contrast, Aminoglycosides such as gentamicin, Gadolinium contrast, Phosphate containing enemas, etc..)  - Adjust Medications according to eGFR  - f/u bmp daily    2. Hypernatremia.  -Na level stable 151-->151  -f/u Na level daily   -continue hypotonic iv hydration as ordered    3. Hyperkalemia.   - resolved   - secondary to alondra  - keep k >4 and <5    4. Hyperphosphatemia:   - resolved     5. R/o CKD: r/o stage 3  with serum cr around 1.4 as per pcp , secondary to htn?, now pts renal function is around baseline  - Keep patient euvolemic and renal diet  - Avoid Nephrotoxic Meds/ Agents such as (NSAIDs, IV contrast, Aminoglycosides such as gentamicin, -Gadolinium contrast, Phosphate containing enemas, etc..)  - Adjust Medications according to eGFR  - f/u bmp daily  - needs out-patient renal f/u with Dr Muse in 2 weeks post discharge    6. Renal cysts: rt cyst is complex  - suggest to repeat renal sono in 3 months as outpatient 89 male from home, found on floor, +ALODNRA, dehydration, afib with RVR, encephalopathy was fluid resuscitated in ED, developed acute respiratory failure secondary to pulmonary edema s/p ICU stay now on telemetry floor.     1. ALONDRA: R/O Hepato-renal syndrome: patient with liver cirrhosis and urine Na<5,on Albumin plus octreotide, now renal function is improving with good U/O   - Cr is normal (1.08 today); net 300ml neg.   - keep SBP>110  - Keep patient euvolemic and renal diet  - Avoid Nephrotoxic Meds/ Agents such as (NSAIDs, IV contrast, Aminoglycosides such as gentamicin, Gadolinium contrast, Phosphate containing enemas, etc..)  - Adjust Medications according to eGFR  - f/u bmp daily    2. Hypernatremia.  -Na level stable 151-->151  -f/u Na level daily   -continue hypotonic iv hydration as ordered    3. Hypokalemia.   - mild. secondary to improved gfr  -please replace with iv kcl x 3 doses 10 meq each  -f/u k level in 4 hrs  - keep k >4 and <5    4. Hyperphosphatemia: mild  - secondary to poor po intake  -encourage high phos food  -f/u phos level in am    5. R/o CKD: r/o stage 3  with serum cr around 1.4 as per pcp , secondary to htn?, now pts renal function is around baseline  - Keep patient euvolemic and renal diet  - Avoid Nephrotoxic Meds/ Agents such as (NSAIDs, IV contrast, Aminoglycosides such as gentamicin, -Gadolinium contrast, Phosphate containing enemas, etc..)  - Adjust Medications according to eGFR  - f/u bmp daily  - needs out-patient renal f/u with Dr Muse in 2 weeks post discharge    6. Renal cysts: rt cyst is complex  - suggest to repeat renal sono in 3 months as outpatient

## 2018-01-25 NOTE — PROGRESS NOTE ADULT - PROBLEM SELECTOR PLAN 3
Abdominal US: cirrhosis  Improving  monitor LFT's  Patient needs follow up with [facility] transplant center within in 2 weeks for Liver transplant after discharge  Dr. Murphy note appreciated

## 2018-01-25 NOTE — PROGRESS NOTE ADULT - ASSESSMENT
84 M with  PMH  DM, HTN, cirrhosis is BIBEMS after being found on floor in respiratory distress after 2 days, and with significant metabolic acidosis from ARF with oliguria from dehydration,  lactic acidosis and fevers. Pt admitted to MICU for Acute Hypoxic  Respiratory Failure  and shock in setting of likely sepsis vs hypovolemia. Thoughts of HRS but pt responded to fluid resuscitation with albumin and Octreotide. Pt started having good UO and kidney function started to improved, pt was transferred to [facility] for further monitor     cousin Andrew Barnes: [phone]  cousin in law Sorin Barnes [phone]

## 2018-01-25 NOTE — PROGRESS NOTE ADULT - PROBLEM SELECTOR PLAN 1
- no bloody BM, or blood per rectum recorded today   -episode likely from hemorrhoids given results of rectal exam  -H/H has been stable  -if INR decreases, but pt continues to bleed consider flexible sigmoidoscopy for further evaluation  GI Dr. Murphy

## 2018-01-25 NOTE — PROGRESS NOTE ADULT - PROBLEM SELECTOR PLAN 4
Likely new onset  Digoxin + Lopressor 25 mg BID PO  Will hold AC given bleeding risk  monitor CBC   Dr Logan note appreciated

## 2018-01-25 NOTE — PROGRESS NOTE ADULT - ATTENDING COMMENTS
Patient seen and examined, agree with above assessment and plan as transcribed above.    - Not a candidate for ischemic eval  - cont Dig, toprol  - would like to add ACE if bp remains stable    Donald Logan MD, FACC  [facility], [facility]  [address] [place] [address]  PHONE:  [phone]  BEEPER : [phone]

## 2018-01-25 NOTE — PROGRESS NOTE ADULT - ATTENDING COMMENTS
Patient was seen and examined by myself. Case was discussed with house staff in details. I have reviewed and agree with the plan as outlined above with edits where appropriate.  Patient slowly improving  Met with his family earlier in the day at about 3 pm Jan 25th and discussed at length about his current medical condition, plan of care and prognosis.  Other plan as outlined above

## 2018-01-25 NOTE — PROGRESS NOTE ADULT - SUBJECTIVE AND OBJECTIVE BOX
SUBJECTIVE:  Patient is alert and awake, denies any chest pain, abdominal pain, N/V/D or other complaints         MEDICATIONS  (STANDING):  ampicillin/sulbactam  IVPB 1.5 Gram(s) IV Intermittent every 12 hours  dextrose 5%. 1000 milliLiter(s) (75 mL/Hr) IV Continuous <Continuous>  dextrose 5%. 1000 milliLiter(s) (75 mL/Hr) IV Continuous <Continuous>  digoxin     Tablet 0.125 milliGRAM(s) Oral daily  insulin glargine Injectable (LANTUS) 8 Unit(s) SubCutaneous at bedtime  insulin lispro (HumaLOG) corrective regimen sliding scale   SubCutaneous every 6 hours  lactobacillus acidophilus 1 Tablet(s) Oral every 8 hours  metoprolol     tartrate 25 milliGRAM(s) Oral two times a day  pantoprazole  Injectable 40 milliGRAM(s) IV Push two times a day  phytonadione   Solution 10 milliGRAM(s) Oral daily    MEDICATIONS  (PRN):  haloperidol    Injectable 2 milliGRAM(s) IV Push every 4 hours PRN agitation  agitation      Vital Signs  Vital Signs Last 24 Hrs  T(C): 36.4 (25 Jan 2018 14:21), Max: 36.8 (24 Jan 2018 21:23)  T(F): 97.6 (25 Jan 2018 14:21), Max: 98.2 (24 Jan 2018 21:23)  HR: 83 (25 Jan 2018 14:21) (83 - 104)  BP: 126/74 (25 Jan 2018 14:21) (126/74 - 149/93)  BP(mean): --  RR: 18 (25 Jan 2018 14:21) (18 - 20)  SpO2: 97% (25 Jan 2018 14:21) (96% - 100%)      01-24 @ 07:01 - 01-25 @ 07:00  --------------------------------------------------------  IN: 1575 mL / OUT: 1450 mL / NET: 125 mL    01-25 @ 07:01 - 01-25 @ 15:13  --------------------------------------------------------  IN: 0 mL / OUT: 300 mL / NET: -300 mL                  PHYSICAL EXAM:  Constitutional: well developed, well nourished  and in nad  Eyes: non icteric sclera seen and mild pallor of the conj noted  Neck :supple, no JVD  Respiratory: CTAB, off nasal cannula   Cardiovascular: S1, s2 present   Gastrointestinal: soft, non tender non distended and nl bs heard  Extremities: no cyanosis, edema or clubbing   Neurological: alert, follows simple commands   Skin: No rashes    LABS:                                 12.0   8.2   )-----------( 130      ( 25 Jan 2018 07:29 )             38.5   01-25    151<H>  |  114<H>  |  25<H>  ----------------------------<  151<H>  3.2<L>   |  29  |  1.08    Ca    8.5      25 Jan 2018 07:29  Phos  2.3     01-25  Mg     1.6     01-25    TPro  5.9<L>  /  Alb  2.5<L>  /  TBili  1.1  /  DBili  x   /  AST  53<H>  /  ALT  53  /  AlkPhos  71  01-24 SUBJECTIVE:  Patient is alert and awake, denies any chest pain, abdominal pain, N/V/D or other complaints   pts family at bedside      MEDICATIONS  (STANDING):  ampicillin/sulbactam  IVPB 1.5 Gram(s) IV Intermittent every 12 hours  dextrose 5%. 1000 milliLiter(s) (75 mL/Hr) IV Continuous <Continuous>  dextrose 5%. 1000 milliLiter(s) (75 mL/Hr) IV Continuous <Continuous>  digoxin     Tablet 0.125 milliGRAM(s) Oral daily  insulin glargine Injectable (LANTUS) 8 Unit(s) SubCutaneous at bedtime  insulin lispro (HumaLOG) corrective regimen sliding scale   SubCutaneous every 6 hours  lactobacillus acidophilus 1 Tablet(s) Oral every 8 hours  metoprolol     tartrate 25 milliGRAM(s) Oral two times a day  pantoprazole  Injectable 40 milliGRAM(s) IV Push two times a day  phytonadione   Solution 10 milliGRAM(s) Oral daily    MEDICATIONS  (PRN):  haloperidol    Injectable 2 milliGRAM(s) IV Push every 4 hours PRN agitation  agitation      Vital Signs  Vital Signs Last 24 Hrs  T(C): 36.4 (25 Jan 2018 14:21), Max: 36.8 (24 Jan 2018 21:23)  T(F): 97.6 (25 Jan 2018 14:21), Max: 98.2 (24 Jan 2018 21:23)  HR: 83 (25 Jan 2018 14:21) (83 - 104)  BP: 126/74 (25 Jan 2018 14:21) (126/74 - 149/93)  BP(mean): --  RR: 18 (25 Jan 2018 14:21) (18 - 20)  SpO2: 97% (25 Jan 2018 14:21) (96% - 100%)      01-24 @ 07:01 - 01-25 @ 07:00  --------------------------------------------------------  IN: 1575 mL / OUT: 1450 mL / NET: 125 mL    01-25 @ 07:01 - 01-25 @ 15:13  --------------------------------------------------------  IN: 0 mL / OUT: 300 mL / NET: -300 mL                  PHYSICAL EXAM:  Constitutional: well developed, well nourished  and in nad  Eyes: non icteric sclera seen and mild pallor of the conj noted  Neck :supple, no JVD  Respiratory: CTAB, off nasal cannula   Cardiovascular: S1, s2 present   Gastrointestinal: soft, non tender non distended and nl bs heard  Extremities: no cyanosis, edema or clubbing   Neurological: alert, follows simple commands   Skin: No rashes    LABS:                                 12.0   8.2   )-----------( 130      ( 25 Jan 2018 07:29 )             38.5   01-25    151<H>  |  114<H>  |  25<H>  ----------------------------<  151<H>  3.2<L>   |  29  |  1.08    Ca    8.5      25 Jan 2018 07:29  Phos  2.3     01-25  Mg     1.6     01-25    TPro  5.9<L>  /  Alb  2.5<L>  /  TBili  1.1  /  DBili  x   /  AST  53<H>  /  ALT  53  /  AlkPhos  71  01-24

## 2018-01-25 NOTE — PROGRESS NOTE ADULT - SUBJECTIVE AND OBJECTIVE BOX
PGY 1 Note discussed with supervising resident and primary attending    Patient is a 84y old  Male who presents with a chief complaint of 'found down and missing for 2 days' (23 Jan 2018 15:10)      INTERVAL HPI/OVERNIGHT EVENTS: patient seen and examined at bedside, no overnight events. no bloody BM reported. still encephalopathic, "year: 1840's"    MEDICATIONS  (STANDING):  ampicillin/sulbactam  IVPB 1.5 Gram(s) IV Intermittent every 12 hours  dextrose 5%. 1000 milliLiter(s) (75 mL/Hr) IV Continuous <Continuous>  dextrose 5%. 1000 milliLiter(s) (75 mL/Hr) IV Continuous <Continuous>  digoxin     Tablet 0.125 milliGRAM(s) Oral daily  insulin glargine Injectable (LANTUS) 8 Unit(s) SubCutaneous at bedtime  insulin lispro (HumaLOG) corrective regimen sliding scale   SubCutaneous every 6 hours  lactobacillus acidophilus 1 Tablet(s) Oral every 8 hours  metoprolol     tartrate 25 milliGRAM(s) Oral two times a day  pantoprazole  Injectable 40 milliGRAM(s) IV Push two times a day  phytonadione   Solution 10 milliGRAM(s) Oral daily  potassium chloride  10 mEq/100 mL IVPB 10 milliEquivalent(s) IV Intermittent every 1 hour    MEDICATIONS  (PRN):  haloperidol    Injectable 2 milliGRAM(s) IV Push every 4 hours PRN agitation      __________________________________________________  REVIEW OF SYSTEMS:  could not be assessed due to mental status      Vital Signs Last 24 Hrs  T(C): 36.4 (25 Jan 2018 14:21), Max: 36.8 (24 Jan 2018 21:23)  T(F): 97.6 (25 Jan 2018 14:21), Max: 98.2 (24 Jan 2018 21:23)  HR: 83 (25 Jan 2018 14:21) (83 - 104)  BP: 126/74 (25 Jan 2018 14:21) (126/74 - 149/93)  BP(mean): --  RR: 18 (25 Jan 2018 14:21) (18 - 20)  SpO2: 97% (25 Jan 2018 14:21) (96% - 100%)    ________________________________________________  PHYSICAL EXAM:  GENERAL: NAD  HEENT: Normocephalic;  conjunctivae and sclerae clear; moist mucous membranes;   NECK : supple  CHEST/LUNG: CTA B/L  HEART: irregular rate and rhythm  ABDOMEN: Soft, NT/ND; BS+  EXTREMITIES: RLE pitting edema +1  SKIN: warm and dry; no rash  NERVOUS SYSTEM:  AAOx1    _________________________________________________  LABS:                        12.0   8.2   )-----------( 130      ( 25 Jan 2018 07:29 )             38.5     01-25    151<H>  |  114<H>  |  25<H>  ----------------------------<  151<H>  3.2<L>   |  29  |  1.08    Ca    8.5      25 Jan 2018 07:29  Phos  2.3     01-25  Mg     1.6     01-25    TPro  5.9<L>  /  Alb  2.5<L>  /  TBili  1.1  /  DBili  x   /  AST  53<H>  /  ALT  53  /  AlkPhos  71  01-24    PT/INR - ( 25 Jan 2018 07:29 )   PT: 14.8 sec;   INR: 1.35 ratio         PTT - ( 25 Jan 2018 07:29 )  PTT:25.4 sec    CAPILLARY BLOOD GLUCOSE      POCT Blood Glucose.: 198 mg/dL (25 Jan 2018 11:19)  POCT Blood Glucose.: 139 mg/dL (25 Jan 2018 07:35)  POCT Blood Glucose.: 130 mg/dL (25 Jan 2018 06:00)  POCT Blood Glucose.: 150 mg/dL (24 Jan 2018 22:02)  POCT Blood Glucose.: 177 mg/dL (24 Jan 2018 17:28)        RADIOLOGY & ADDITIONAL TESTS:      Consultant(s) Notes Reviewed:   YES    Care Discussed with Consultants : YES    Plan of care was discussed with patient and /or primary care giver; all questions and concerns were addressed and care was aligned with patient's wishes. YES

## 2018-01-25 NOTE — PROGRESS NOTE ADULT - SUBJECTIVE AND OBJECTIVE BOX
pt seen and examined, no complaints on exam.  pt agitated on exam     ampicillin/sulbactam  IVPB 1.5 Gram(s) IV Intermittent every 12 hours  dextrose 5%. 1000 milliLiter(s) IV Continuous <Continuous>  digoxin     Tablet 0.125 milliGRAM(s) Oral daily  haloperidol    Injectable 2 milliGRAM(s) IV Push every 4 hours PRN  insulin glargine Injectable (LANTUS) 8 Unit(s) SubCutaneous at bedtime  insulin lispro (HumaLOG) corrective regimen sliding scale   SubCutaneous every 6 hours  lactobacillus acidophilus 1 Tablet(s) Oral every 8 hours  metoprolol    tartrate Injectable 2.5 milliGRAM(s) IV Push every 4 hours  pantoprazole  Injectable 40 milliGRAM(s) IV Push two times a day  phytonadione   Solution 10 milliGRAM(s) Oral daily                            12.6   8.2   )-----------( 159      ( 24 Jan 2018 08:13 )             42.4       Hemoglobin: 12.6 g/dL (01-24 @ 08:13)  Hemoglobin: 12.2 g/dL (01-23 @ 16:06)  Hemoglobin: 13.1 g/dL (01-23 @ 09:10)  Hemoglobin: 11.2 g/dL (01-23 @ 06:49)  Hemoglobin: 13.8 g/dL (01-22 @ 17:56)      01-24    151<H>  |  117<H>  |  34<H>  ----------------------------<  210<H>  3.7   |  27  |  1.22    Ca    8.7      24 Jan 2018 08:13  Phos  2.4     01-24  Mg     1.8     01-24    TPro  5.9<L>  /  Alb  2.5<L>  /  TBili  1.1  /  DBili  x   /  AST  53<H>  /  ALT  53  /  AlkPhos  71  01-24    Creatinine Trend: 1.22<--, 1.34<--, 1.46<--, SEE COMMENT TYPE:(C=Critical, N=Notification, A=Abnormal) C  TESTS: CREAT  DATE/TIME CALLED: 01/23/18 07:54  CALLED TO: LIAM CORDOVA RN  READ BACK (2 Patient Identifiers)(Y/N): Y  READ BACK VALUES (Y/N): _Y  CALLED BY: BETH,01/23/18 07:55  POSSIBLE CONTAMINATION<--, 1.62<--, 1.97<--      T(C): 36.4 (01-25-18 @ 05:34), Max: 36.8 (01-24-18 @ 14:33)  HR: 92 (01-25-18 @ 05:34) (89 - 104)  BP: 139/62 (01-25-18 @ 05:34) (139/62 - 159/92)  RR: 18 (01-25-18 @ 05:34) (18 - 20)  SpO2: 96% (01-25-18 @ 05:34) (96% - 100%)  Wt(kg): --    I&O's Summary    23 Jan 2018 07:01  -  24 Jan 2018 07:00  --------------------------------------------------------  IN: 500 mL / OUT: 1600 mL / NET: -1100 mL    24 Jan 2018 07:01  -  25 Jan 2018 05:37  --------------------------------------------------------  IN: 1000 mL / OUT: 950 mL / NET: 50 mL         HEENT:   Normal oral mucosa,   Lymphatic: No obvious lymphadenopathy , no edema  Cardiovascular: Normal S1 S2, No JVD, 1/6 PEDRO murmur, Peripheral pulses palpable 2+ bilaterally  Respiratory: Coarse mechanical BS, normal effort 	  Gastrointestinal:  Soft, Non-tender, + BS	  Skin: No rashes,  No cyanosis, warm to touch  Musculoskeletal: unable to fully assess  Psychiatry:  Confused      ECHO: < from: Transthoracic Echocardiogram (01.17.18 @ 14:22) >  CONCLUSIONS:  1. Normal mitral valve. Mild to moderate mitral  regurgitation.  2. Aortic valve not seen well.  3. Aortic Root: 3.1 cm.  4. Mild left atrial enlargement.  5. Normal left ventricular internal dimensions and wall  thicknesses.  6. Endocardium not well visualized; grossly severely  reduced l left ventricular systolic function.  7. Grade II diastolic dysfunction.  8. Normal right atrium.  9. Normal right ventricular size and function.  10. RA Pressure is 8 mm Hg.  11. RV systolic pressure is 33 mm Hg.  12. Normal tricuspid valve.  13. Normal pulmonic valve.  14. Normal pericardium with no pericardial effusion.    < end of copied text >      ASSESSMENT/PLAN: 	89y Male ? cirrhosis found down by his superintendant,  admitted with respiratory failure new rapid afib, shock, found with severe LV dyfx  of unknown duration.    cont rate control with Dig , BB  trend INR  trend h/h , LFT   ABX per medicine   A/C once cleared by GI - heparin gtt ( afib and risk for CVA) , if bleeding risk is great, can ASA be 325 mg be started    GI / DVT prophylaxis.  renal follow up , hold diuresis    keep K>4, mag >2.0  D/W Dr Logan

## 2018-01-26 NOTE — PROGRESS NOTE ADULT - SUBJECTIVE AND OBJECTIVE BOX
SUBJECTIVE:  Patient is awake but disoriented.     MEDICATIONS  (STANDING):  ampicillin/sulbactam  IVPB 1.5 Gram(s) IV Intermittent every 12 hours  dextrose 5%. 1000 milliLiter(s) (75 mL/Hr) IV Continuous <Continuous>  dextrose 5%. 1000 milliLiter(s) (75 mL/Hr) IV Continuous <Continuous>  digoxin     Tablet 0.125 milliGRAM(s) Oral daily  insulin glargine Injectable (LANTUS) 8 Unit(s) SubCutaneous at bedtime  insulin lispro (HumaLOG) corrective regimen sliding scale   SubCutaneous every 6 hours  lactobacillus acidophilus 1 Tablet(s) Oral every 8 hours  metoprolol     tartrate 25 milliGRAM(s) Oral two times a day  pantoprazole  Injectable 40 milliGRAM(s) IV Push two times a day    MEDICATIONS  (PRN):  haloperidol    Injectable 2 milliGRAM(s) IV Push every 4 hours PRN agitation        Vital Signs  Vital Signs Last 24 Hrs  T(C): 36.4 (26 Jan 2018 05:39), Max: 36.6 (25 Jan 2018 21:33)  T(F): 97.5 (26 Jan 2018 05:39), Max: 97.9 (25 Jan 2018 21:33)  HR: 93 (26 Jan 2018 06:45) (83 - 95)  BP: 150/74 (26 Jan 2018 06:45) (126/74 - 150/82)  BP(mean): --  RR: 18 (26 Jan 2018 05:39) (18 - 18)  SpO2: 99% (26 Jan 2018 05:39) (96% - 99%)    01-25 @ 07:01  -  01-26 @ 07:00  --------------------------------------------------------  IN: 0 mL / OUT: 300 mL / NET: -300 mL      PHYSICAL EXAM:  Constitutional: well developed, well nourished  and in nad  Eyes: non icteric sclera seen and mild pallor of the conj noted  Neck :supple, no JVD  Respiratory: CTAB, off nasal cannula   Cardiovascular: S1, s2 present   Gastrointestinal: soft, non tender non distended and nl bs heard  Extremities: no cyanosis, edema or clubbing   Neurological: awake, not alert or following commands.   Skin: No rashes    LABS:                                  11.7   10.8  )-----------( 121      ( 26 Jan 2018 08:10 )             38.3   01-26    148<H>  |  113<H>  |  22<H>  ----------------------------<  162<H>  3.5   |  27  |  1.24    Ca    8.3<L>      26 Jan 2018 08:10  Phos  2.6     01-26  Mg     1.4     01-26    TPro  5.4<L>  /  Alb  2.4<L>  /  TBili  1.0  /  DBili  x   /  AST  41<H>  /  ALT  48  /  AlkPhos  72  01-26

## 2018-01-26 NOTE — PROGRESS NOTE ADULT - ASSESSMENT
89 male from home, found on floor, +ALONDRA, dehydration, afib with RVR, encephalopathy was fluid resuscitated in ED, developed acute respiratory failure secondary to pulmonary edema s/p ICU stay now on telemetry floor.     1. ALONDRA: resolved   - s/p Hepato-renal syndrome: patient with liver cirrhosis and urine Na<5,on Albumin plus octreotide, now renal function is improving with good U/O   - Cr is normal and patient is diuresing well.   - keep SBP>110  - Keep patient euvolemic and renal diet  - Avoid Nephrotoxic Meds/ Agents such as (NSAIDs, IV contrast, Aminoglycosides such as gentamicin, Gadolinium contrast, Phosphate containing enemas, etc..)  - Adjust Medications according to eGFR  - f/u bmp daily    2. Hypernatremia.  -improving at 148 today; continue d5   -f/u Na level daily     3. Hypokalemia.   -resolved   -f/u k level in 4 hrs  - keep k >4 and <5    4. Hyperphosphatemia: mild  - secondary to poor po intake  -encourage high phos food  -f/u phos level in am    5. Hypomagnesemia   sec to poor PO intake   replace and follow BMP in am     5. R/o CKD: r/o stage 3  with serum cr around 1.4 as per pcp , secondary to htn?, now pts renal function is around baseline  - Keep patient euvolemic and renal diet  - Avoid Nephrotoxic Meds/ Agents such as (NSAIDs, IV contrast, Aminoglycosides such as gentamicin, -Gadolinium contrast, Phosphate containing enemas, etc..)  - Adjust Medications according to eGFR  - f/u bmp daily  - needs out-patient renal f/u with Dr Muse in 2 weeks post discharge    6. Renal cysts: rt cyst is complex  - suggest to repeat renal sono in 3 months as outpatient 89 male from home, found on floor, +ALONDRA, dehydration, afib with RVR, encephalopathy was fluid resuscitated in ED, developed acute respiratory failure secondary to pulmonary edema s/p ICU stay now on telemetry floor.     1. ALONDRA: resolved   - s/p Hepato-renal syndrome: patient with liver cirrhosis and urine Na<5,on Albumin plus octreotide, now renal function is improving with good U/O   - Cr is normal and patient is diuresing well.   - keep SBP>110  - Keep patient euvolemic and renal diet  - Avoid Nephrotoxic Meds/ Agents such as (NSAIDs, IV contrast, Aminoglycosides such as gentamicin, Gadolinium contrast, Phosphate containing enemas, etc..)  - Adjust Medications according to eGFR  - f/u bmp daily    2. Hypernatremia.  -improving at 148 today; continue d5   -f/u Na level daily     3. Hypokalemia.   -resolved   -f/u k level in 4 hrs  - keep k >4 and <5    4. Hypophosphatemia: mild  - secondary to poor po intake  -encourage high phos food  -f/u phos level in am    5. Hypomagnesemia   sec to poor PO intake   replace and follow BMP in am     5. R/o CKD: r/o stage 3  with serum cr around 1.4 as per pcp , secondary to htn?, now pts renal function is around baseline  - Keep patient euvolemic and renal diet  - Avoid Nephrotoxic Meds/ Agents such as (NSAIDs, IV contrast, Aminoglycosides such as gentamicin, -Gadolinium contrast, Phosphate containing enemas, etc..)  - Adjust Medications according to eGFR  - f/u bmp daily  - needs out-patient renal f/u with Dr Muse in 2 weeks post discharge    6. Renal cysts: rt cyst is complex  - suggest to repeat renal sono in 3 months as outpatient    I examined and evaluated the patient. I discussed the case with the resident and agree with the findings and plan as documented in the resident's note, for which I collaborated in the composition.

## 2018-01-26 NOTE — PROGRESS NOTE ADULT - PROBLEM SELECTOR PLAN 2
Current sodium: 148 (improving)  Continue D5W @75cc/hr  monitor BMP daily   note appreciated Abdominal US: cirrhosis  Improving  monitor LFT's  Patient needs follow up with [facility] transplant center within in 2 weeks for Liver transplant after discharge  Dr. Murphy note appreciated

## 2018-01-26 NOTE — PROGRESS NOTE ADULT - PROBLEM SELECTOR PLAN 3
Abdominal US: cirrhosis  Improving  monitor LFT's  Patient needs follow up with [facility] transplant center within in 2 weeks for Liver transplant after discharge  Dr. Murphy note appreciated Likely new onset  Digoxin + Lopressor 25 mg BID PO  Will hold AC given bleeding risk  monitor CBC   Dr Logan note appreciated

## 2018-01-26 NOTE — PROGRESS NOTE ADULT - ASSESSMENT
84 M with  PMH  DM, HTN, cirrhosis is BIBEMS after being found on floor in respiratory distress after 2 days, and with significant metabolic acidosis from ARF with oliguria from dehydration,  lactic acidosis and fevers. Pt admitted to MICU for Acute Hypoxic  Respiratory Failure  and shock in setting of likely sepsis vs hypovolemia. Thoughts of HRS but pt responded to fluid resuscitation with albumin and Octreotide. Pt started having good UO and kidney function started to improved, pt was transferred to [facility] for further monitor     cousin Andrew Barnes: [phone]  cousin in law Sorin Barnes [phone]

## 2018-01-26 NOTE — ADVANCED PRACTICE NURSE CONSULT - ASSESSMENT
This is a 84yr old male patient admitted for Acidosis, presenting with the following:  -There is a Ruptured Bullae to the R. Inner Thigh (4cm x 3cm) with red tissue and scant drainage  -There are (x2) Venous Stasis Ulcers (4cm x 0.5cm) (2.7cm x 0.5cm) to the L. Lower Post Leg with slough, pink tissue, and scant drainage

## 2018-01-26 NOTE — PROGRESS NOTE ADULT - SUBJECTIVE AND OBJECTIVE BOX
pt seen and examined, no complaints on exam.  pt is still agitated at times, NAD on exam     ampicillin/sulbactam  IVPB 1.5 Gram(s) IV Intermittent every 12 hours  dextrose 5%. 1000 milliLiter(s) IV Continuous <Continuous>  dextrose 5%. 1000 milliLiter(s) IV Continuous <Continuous>  digoxin     Tablet 0.125 milliGRAM(s) Oral daily  haloperidol    Injectable 2 milliGRAM(s) IV Push every 4 hours PRN  insulin glargine Injectable (LANTUS) 8 Unit(s) SubCutaneous at bedtime  insulin lispro (HumaLOG) corrective regimen sliding scale   SubCutaneous every 6 hours  lactobacillus acidophilus 1 Tablet(s) Oral every 8 hours  metoprolol     tartrate 25 milliGRAM(s) Oral two times a day  pantoprazole  Injectable 40 milliGRAM(s) IV Push two times a day  phytonadione   Solution 10 milliGRAM(s) Oral daily                            12.0   8.2   )-----------( 130      ( 25 Jan 2018 07:29 )             38.5       Hemoglobin: 12.0 g/dL (01-25 @ 07:29)  Hemoglobin: 12.6 g/dL (01-24 @ 08:13)  Hemoglobin: 12.2 g/dL (01-23 @ 16:06)  Hemoglobin: 13.1 g/dL (01-23 @ 09:10)  Hemoglobin: 11.2 g/dL (01-23 @ 06:49)      01-25    151<H>  |  114<H>  |  25<H>  ----------------------------<  151<H>  3.2<L>   |  29  |  1.08    Ca    8.5      25 Jan 2018 07:29  Phos  2.3     01-25  Mg     1.6     01-25    TPro  5.9<L>  /  Alb  2.5<L>  /  TBili  1.1  /  DBili  x   /  AST  53<H>  /  ALT  53  /  AlkPhos  71  01-24    Creatinine Trend: 1.08<--, 1.22<--, 1.34<--, 1.46<--, SEE COMMENT TYPE:(C=Critical, N=Notification, A=Abnormal) C  TESTS: CREAT  DATE/TIME CALLED: 01/23/18 07:54  CALLED TO: LIAM CORDOVA,RN  READ BACK (2 Patient Identifiers)(Y/N): Y  READ BACK VALUES (Y/N): _Y  CALLED BY: BETH,01/23/18 07:55  POSSIBLE CONTAMINATION<--, 1.62<--    COAGS:           T(C): 36.6 (01-25-18 @ 21:33), Max: 36.6 (01-25-18 @ 21:33)  HR: 95 (01-25-18 @ 21:33) (83 - 97)  BP: 150/82 (01-25-18 @ 21:33) (126/74 - 150/82)  RR: 18 (01-25-18 @ 21:33) (18 - 18)  SpO2: 96% (01-25-18 @ 21:33) (96% - 97%)  Wt(kg): --    I&O's Summary    24 Jan 2018 07:01  -  25 Jan 2018 07:00  --------------------------------------------------------  IN: 1575 mL / OUT: 1450 mL / NET: 125 mL    25 Jan 2018 07:01  -  26 Jan 2018 05:23  --------------------------------------------------------  IN: 0 mL / OUT: 300 mL / NET: -300 mL      HEENT:   Normal oral mucosa,   Lymphatic: No obvious lymphadenopathy , no edema  Cardiovascular: Normal S1 S2, No JVD, 1/6 PEDRO murmur, Peripheral pulses palpable 2+ bilaterally  Respiratory: Coarse mechanical BS, normal effort 	  Gastrointestinal:  Soft, Non-tender, + BS	  Skin: No rashes,  No cyanosis, warm to touch  Musculoskeletal: unable to fully assess  Psychiatry:  Confused      ECHO: < from: Transthoracic Echocardiogram (01.17.18 @ 14:22) >  CONCLUSIONS:  1. Normal mitral valve. Mild to moderate mitral  regurgitation.  2. Aortic valve not seen well.  3. Aortic Root: 3.1 cm.  4. Mild left atrial enlargement.  5. Normal left ventricular internal dimensions and wall  thicknesses.  6. Endocardium not well visualized; grossly severely  reduced l left ventricular systolic function.  7. Grade II diastolic dysfunction.  8. Normal right atrium.  9. Normal right ventricular size and function.  10. RA Pressure is 8 mm Hg.  11. RV systolic pressure is 33 mm Hg.  12. Normal tricuspid valve.  13. Normal pulmonic valve.  14. Normal pericardium with no pericardial effusion.    < end of copied text >      ASSESSMENT/PLAN: 	89y Male ? cirrhosis found down by his superintendant,  admitted with respiratory failure new rapid afib, shock, found with severe LV dyfx  of unknown duration.    cont rate control with Dig , BB  trend INR ( 1.35 yesterday ) , pt needs A/C for CVA prevention, due to gi bleed , can pt recieve  mg , once GI clears pt    GI / DVT prophylaxis.  renal follow up , hold diuresis    keep K>4, mag >2.0   would like to add ACE if bp remains stable  fall precaution / Aspiration precaution   cont Haldol per medicine   D/W Dr Logan pt seen and examined, no complaints on exam.  pt is still agitated at times, NAD on exam     ampicillin/sulbactam  IVPB 1.5 Gram(s) IV Intermittent every 12 hours  dextrose 5%. 1000 milliLiter(s) IV Continuous <Continuous>  dextrose 5%. 1000 milliLiter(s) IV Continuous <Continuous>  digoxin     Tablet 0.125 milliGRAM(s) Oral daily  haloperidol    Injectable 2 milliGRAM(s) IV Push every 4 hours PRN  insulin glargine Injectable (LANTUS) 8 Unit(s) SubCutaneous at bedtime  insulin lispro (HumaLOG) corrective regimen sliding scale   SubCutaneous every 6 hours  lactobacillus acidophilus 1 Tablet(s) Oral every 8 hours  metoprolol     tartrate 25 milliGRAM(s) Oral two times a day  pantoprazole  Injectable 40 milliGRAM(s) IV Push two times a day  phytonadione   Solution 10 milliGRAM(s) Oral daily                            12.0   8.2   )-----------( 130      ( 25 Jan 2018 07:29 )             38.5       Hemoglobin: 12.0 g/dL (01-25 @ 07:29)  Hemoglobin: 12.6 g/dL (01-24 @ 08:13)  Hemoglobin: 12.2 g/dL (01-23 @ 16:06)  Hemoglobin: 13.1 g/dL (01-23 @ 09:10)  Hemoglobin: 11.2 g/dL (01-23 @ 06:49)      01-25    151<H>  |  114<H>  |  25<H>  ----------------------------<  151<H>  3.2<L>   |  29  |  1.08    Ca    8.5      25 Jan 2018 07:29  Phos  2.3     01-25  Mg     1.6     01-25    TPro  5.9<L>  /  Alb  2.5<L>  /  TBili  1.1  /  DBili  x   /  AST  53<H>  /  ALT  53  /  AlkPhos  71  01-24    Creatinine Trend: 1.08<--, 1.22<--, 1.34<--, 1.46<--, SEE COMMENT TYPE:(C=Critical, N=Notification, A=Abnormal) C  TESTS: CREAT  DATE/TIME CALLED: 01/23/18 07:54  CALLED TO: LIAM CORDOVA,RN  READ BACK (2 Patient Identifiers)(Y/N): Y  READ BACK VALUES (Y/N): _Y  CALLED BY: BETH,01/23/18 07:55  POSSIBLE CONTAMINATION<--, 1.62<--    COAGS:           T(C): 36.6 (01-25-18 @ 21:33), Max: 36.6 (01-25-18 @ 21:33)  HR: 95 (01-25-18 @ 21:33) (83 - 97)  BP: 150/82 (01-25-18 @ 21:33) (126/74 - 150/82)  RR: 18 (01-25-18 @ 21:33) (18 - 18)  SpO2: 96% (01-25-18 @ 21:33) (96% - 97%)  Wt(kg): --    I&O's Summary    24 Jan 2018 07:01  -  25 Jan 2018 07:00  --------------------------------------------------------  IN: 1575 mL / OUT: 1450 mL / NET: 125 mL    25 Jan 2018 07:01  -  26 Jan 2018 05:23  --------------------------------------------------------  IN: 0 mL / OUT: 300 mL / NET: -300 mL      HEENT:   Normal oral mucosa,   Lymphatic: No obvious lymphadenopathy , no edema  Cardiovascular: Normal S1 S2, No JVD, 1/6 PEDRO murmur, Peripheral pulses palpable 2+ bilaterally  Respiratory: Coarse mechanical BS, normal effort 	  Gastrointestinal:  Soft, Non-tender, + BS	  Skin: No rashes,  No cyanosis, warm to touch  Musculoskeletal: unable to fully assess  Psychiatry:  Confused      ECHO: < from: Transthoracic Echocardiogram (01.17.18 @ 14:22) >  CONCLUSIONS:  1. Normal mitral valve. Mild to moderate mitral  regurgitation.  2. Aortic valve not seen well.  3. Aortic Root: 3.1 cm.  4. Mild left atrial enlargement.  5. Normal left ventricular internal dimensions and wall  thicknesses.  6. Endocardium not well visualized; grossly severely  reduced l left ventricular systolic function.  7. Grade II diastolic dysfunction.  8. Normal right atrium.  9. Normal right ventricular size and function.  10. RA Pressure is 8 mm Hg.  11. RV systolic pressure is 33 mm Hg.  12. Normal tricuspid valve.  13. Normal pulmonic valve.  14. Normal pericardium with no pericardial effusion.    < end of copied text >      ASSESSMENT/PLAN: 	89y Male ? cirrhosis found down by his superintendant,  admitted with respiratory failure new rapid afib, shock, found with severe LV dyfx  of unknown duration.    cont rate control with Dig , BB  trend INR ( 1.35 yesterday )    GI / DVT prophylaxis.  renal follow up , hold diuresis    keep K>4, mag >2.0   would like to add ACE if bp remains stable  fall precaution / Aspiration precaution   cont Haldol per medicine   D/W Dr Logan

## 2018-01-26 NOTE — PROGRESS NOTE ADULT - PROBLEM SELECTOR PLAN 4
Likely new onset  Digoxin + Lopressor 25 mg BID PO  Will hold AC given bleeding risk  monitor CBC   Dr Logan note appreciated Lantus 8U HS, HSS FS AC HS  HbA1C: 7.5%

## 2018-01-26 NOTE — PROGRESS NOTE ADULT - ATTENDING COMMENTS
Patient was seen and examined by myself. Case was discussed with house staff in details. I have reviewed and agree with the plan as outlined above with edits where appropriate.  85 y/o male with  1. bleeding per rectum- resolving; hemoglobin stable; continue to monitor  2. AFIB presumed new onset- rate controlled; no anticoagulation for now due to recent bleeding  3. septic shock s/p pressors- sepsis resolved  4. liver cirrhosis with coagulopathy and abnormal LFTs- monitor  5. Acute respiratory failure s/p intubation- stable post extubation but with residual laryngitis  6. Dysphagia- dysphagia diet as tolerated; maintain aspiration precautions  7. Acute renal failure - renal functions stabilizing; monitor and avoid nephrotoxins  8. Diabetes-  monitor f/s; continue with insulin  9. Hypernatremia- resolving; continue with gentle hydration  10 metabolic encephalopathy- resolving  - Holding  anticoagulation due to active slow bleeding;  - continue PPI  - monitor sodium levels  - Dysphagia diet without liquids for now and aspiration precautions  overall prognosis is guarded. P  supportive measures

## 2018-01-26 NOTE — PROGRESS NOTE ADULT - ATTENDING COMMENTS
Patient seen and examined, agree with above assessment and plan as transcribed above.    - Consider ASA and plavix if ok with GI since it appears he is high risk for full anticoagulation.  - Cont Dig  - Start Lisinopril 2.5 mg PO daily  - Change lopressor to toprol XL 25 mg PO daily    Donald Logan MD, [facility]  [facility], [facility]  [address] [place] [address]  PHONE:  [phone]  BEEPER : [phone]

## 2018-01-26 NOTE — PROGRESS NOTE ADULT - PROBLEM SELECTOR PLAN 5
resolved IMPROVE VTE score 2  Need for DVT ppx, hold AC due to BRBPR  GI PPX on protonix IMPROVE VTE score 2  Need for DVT ppx, hold AC due to highbleeding risk  GI PPX on protonix

## 2018-01-26 NOTE — PROGRESS NOTE ADULT - PROBLEM SELECTOR PLAN 1
resolved Current sodium: 148 (improving)  Continue D5W @75cc/hr  monitor BMP daily   note appreciated

## 2018-01-26 NOTE — PROGRESS NOTE ADULT - SUBJECTIVE AND OBJECTIVE BOX
PGY 1 Note discussed with supervising resident and primary attending    Patient is a 84y old  Male who presents with a chief complaint of 'found down and missing for 2 days' (23 Jan 2018 15:10)      INTERVAL HPI/OVERNIGHT EVENTS: patient seen and examined at bedside, still encephalopathic... will consider adding lactulose    MEDICATIONS  (STANDING):  ampicillin/sulbactam  IVPB 1.5 Gram(s) IV Intermittent every 12 hours  dextrose 5%. 1000 milliLiter(s) (75 mL/Hr) IV Continuous <Continuous>  dextrose 5%. 1000 milliLiter(s) (75 mL/Hr) IV Continuous <Continuous>  digoxin     Tablet 0.125 milliGRAM(s) Oral daily  insulin glargine Injectable (LANTUS) 8 Unit(s) SubCutaneous at bedtime  insulin lispro (HumaLOG) corrective regimen sliding scale   SubCutaneous every 6 hours  lactobacillus acidophilus 1 Tablet(s) Oral every 8 hours  lisinopril 2.5 milliGRAM(s) Oral daily  metoprolol succinate ER 25 milliGRAM(s) Oral daily  pantoprazole  Injectable 40 milliGRAM(s) IV Push two times a day    MEDICATIONS  (PRN):  haloperidol    Injectable 2 milliGRAM(s) IV Push every 4 hours PRN agitation      __________________________________________________  REVIEW OF SYSTEMS:    CONSTITUTIONAL: No fever, no chills  EYES: no acute visual disturbances  NECK: No pain or stiffness  RESPIRATORY: No cough; No shortness of breath  CARDIOVASCULAR: No chest pain, no palpitations  GASTROINTESTINAL: No pain. No nausea or vomiting; No diarrhea   NEUROLOGICAL: No headache or numbness, no tremors  MUSCULOSKELETAL: No joint pain, no muscle pain  GENITOURINARY: no dysuria, no frequency, no hesitancy  PSYCHIATRY: no depression , no anxiety  ALL OTHER  ROS negative        Vital Signs Last 24 Hrs  T(C): 36.7 (26 Jan 2018 14:01), Max: 36.7 (26 Jan 2018 14:01)  T(F): 98 (26 Jan 2018 14:01), Max: 98 (26 Jan 2018 14:01)  HR: 96 (26 Jan 2018 14:33) (93 - 96)  BP: 149/81 (26 Jan 2018 14:33) (130/51 - 150/82)  BP(mean): --  RR: 16 (26 Jan 2018 14:01) (16 - 18)  SpO2: 100% (26 Jan 2018 14:33) (96% - 100%)    ________________________________________________  PHYSICAL EXAM:  GENERAL: NAD  HEENT: Normocephalic;  conjunctivae and sclerae clear; moist mucous membranes;   NECK : supple  CHEST/LUNG: Clear to auscultation bilaterally  HEART: irregular rate and rhythm  ABDOMEN: Soft, Nontender, Nondistended; Bowel sounds present  EXTREMITIES: pitting edema +1 right leg  SKIN: warm and dry; no rash  NERVOUS SYSTEM:  AAOx1    _________________________________________________  LABS:                        11.7   10.8  )-----------( 121      ( 26 Jan 2018 08:10 )             38.3     01-26    148<H>  |  113<H>  |  22<H>  ----------------------------<  162<H>  3.5   |  27  |  1.24    Ca    8.3<L>      26 Jan 2018 08:10  Phos  2.6     01-26  Mg     1.4     01-26    TPro  5.4<L>  /  Alb  2.4<L>  /  TBili  1.0  /  DBili  x   /  AST  41<H>  /  ALT  48  /  AlkPhos  72  01-26    PT/INR - ( 26 Jan 2018 08:10 )   PT: 13.7 sec;   INR: 1.25 ratio         PTT - ( 25 Jan 2018 07:29 )  PTT:25.4 sec    CAPILLARY BLOOD GLUCOSE      POCT Blood Glucose.: 141 mg/dL (26 Jan 2018 11:26)  POCT Blood Glucose.: 144 mg/dL (26 Jan 2018 07:36)  POCT Blood Glucose.: 139 mg/dL (26 Jan 2018 06:56)  POCT Blood Glucose.: 150 mg/dL (26 Jan 2018 00:17)  POCT Blood Glucose.: 153 mg/dL (25 Jan 2018 17:47)        RADIOLOGY & ADDITIONAL TESTS:    Consultant(s) Notes Reviewed:   YES    Care Discussed with Consultants : YES    Plan of care was discussed with patient and /or primary care giver; all questions and concerns were addressed and care was aligned with patient's wishes. YES

## 2018-01-26 NOTE — ADVANCED PRACTICE NURSE CONSULT - RECOMMEDATIONS
-Clean all wounds with normal saline and apply skin prep to the surrounding skin  -Apply a Hydrocolloid dressing (Comfeel) to the L. Lower Post Leg wounds Q 72hrs PRN  -Apply Xeroform gauze to the R. Inner Thigh wound bed and cover with a Foam dressing Daily PRN  -Elevate/float the patients heels using heel protectors and reposition the patient Q 2hrs using wedges or pillows

## 2018-01-27 NOTE — PROGRESS NOTE ADULT - PROBLEM SELECTOR PLAN 3
resolving  pt was started on lactulose 10 mg q8hrs resolving  pt was started on lactulose 10 mg q8hrs, but later dc'ed resolving  pt was started on lactulose 10 mg q8hrs, but later discontinued

## 2018-01-27 NOTE — PROGRESS NOTE ADULT - PROBLEM SELECTOR PLAN 4
Likely new onset  Digoxin + Lopressor 25 mg BID PO  Will hold AC given bleeding risk  monitor CBC   Dr Logan note appreciated AFIB presumed new onset- rate controlled; no anticoagulation for now due to recent bleeding  Digoxin + Lopressor 25 mg BID PO  Will hold AC given bleeding risk  monitor CBC   Dr Logan note appreciated

## 2018-01-27 NOTE — PROGRESS NOTE ADULT - ATTENDING COMMENTS
Patient was seen and examined by myself. Case was discussed with house staff in details. I have reviewed and agree with the plan as outlined above with edits where appropriate.

## 2018-01-27 NOTE — PROGRESS NOTE ADULT - ASSESSMENT
84 M with  PMH  DM, HTN, cirrhosis is BIBEMS after being found on floor in respiratory distress after 2 days, and with significant metabolic acidosis from ARF with oliguria from dehydration,  lactic acidosis and fevers. Pt admitted to MICU for Acute Hypoxic  Respiratory Failure  and shock in setting of likely sepsis vs hypovolemia. Thoughts of HRS but pt responded to fluid resuscitation with albumin and Octreotide. Pt started having good UO and kidney function started to improved, pt was transferred to [facility] for further monitor     cousin Andrew Barnes: [phone]  cousin in law Sorin Barnes [phone]

## 2018-01-27 NOTE — PROGRESS NOTE ADULT - SUBJECTIVE AND OBJECTIVE BOX
PGY 1 Note discussed with supervising resident and primary attending    Patient is a 84y old  Male who presents with a chief complaint of 'found down and missing for 2 days' (23 Jan 2018 15:10)      INTERVAL HPI/OVERNIGHT EVENTS: patient seen and examined at bedside, no overnight events.      MEDICATIONS  (STANDING):  ampicillin/sulbactam  IVPB 1.5 Gram(s) IV Intermittent every 12 hours  dextrose 5%. 1000 milliLiter(s) (75 mL/Hr) IV Continuous <Continuous>  dextrose 5%. 1000 milliLiter(s) (75 mL/Hr) IV Continuous <Continuous>  dextrose 5%. 1000 milliLiter(s) (75 mL/Hr) IV Continuous <Continuous>  digoxin     Tablet 0.125 milliGRAM(s) Oral daily  insulin glargine Injectable (LANTUS) 8 Unit(s) SubCutaneous at bedtime  insulin lispro (HumaLOG) corrective regimen sliding scale   SubCutaneous three times a day with meals  lactobacillus acidophilus 1 Tablet(s) Oral every 8 hours  lactulose Syrup 10 Gram(s) Oral every 8 hours  lisinopril 2.5 milliGRAM(s) Oral daily  metoprolol succinate ER 25 milliGRAM(s) Oral daily  pantoprazole  Injectable 40 milliGRAM(s) IV Push two times a day    MEDICATIONS  (PRN):  haloperidol    Injectable 2 milliGRAM(s) IV Push every 4 hours PRN agitation      __________________________________________________  REVIEW OF SYSTEMS:  could not be assessed due to encephalopathy      Vital Signs Last 24 Hrs  T(C): 36.7 (27 Jan 2018 05:12), Max: 36.7 (26 Jan 2018 14:01)  T(F): 98 (27 Jan 2018 05:12), Max: 98 (26 Jan 2018 14:01)  HR: 86 (27 Jan 2018 05:35) (86 - 126)  BP: 149/69 (27 Jan 2018 05:35) (93/66 - 159/69)  BP(mean): --  RR: 18 (27 Jan 2018 05:35) (16 - 18)  SpO2: 98% (27 Jan 2018 05:35) (96% - 100%)    ________________________________________________  PHYSICAL EXAM:  GENERAL: NAD  HEENT: Normocephalic;  conjunctivae and sclerae clear; moist mucous membranes;   NECK : supple  CHEST/LUNG: Clear to auscultation bilaterally  HEART: S1 S2  regular; no murmurs, gallops or rubs  ABDOMEN: Soft, Nontender, Nondistended; Bowel sounds present  EXTREMITIES: no ECC  SKIN: warm and dry; no rash  NERVOUS SYSTEM:  AAOx1    _________________________________________________  LABS:                        11.3   10.0  )-----------( 114      ( 27 Jan 2018 08:59 )             35.1     01-27    143  |  109<H>  |  17  ----------------------------<  175<H>  3.1<L>   |  26  |  1.11    Ca    8.2<L>      27 Jan 2018 08:25  Phos  2.2     01-27  Mg     2.0     01-27    TPro  5.4<L>  /  Alb  2.3<L>  /  TBili  0.9  /  DBili  x   /  AST  35  /  ALT  40  /  AlkPhos  75  01-27    PT/INR - ( 27 Jan 2018 08:25 )   PT: 13.7 sec;   INR: 1.25 ratio             CAPILLARY BLOOD GLUCOSE      POCT Blood Glucose.: 183 mg/dL (27 Jan 2018 08:10)  POCT Blood Glucose.: 151 mg/dL (26 Jan 2018 21:42)  POCT Blood Glucose.: 153 mg/dL (26 Jan 2018 16:26)  POCT Blood Glucose.: 141 mg/dL (26 Jan 2018 11:26)        RADIOLOGY & ADDITIONAL TESTS:      Consultant(s) Notes Reviewed:   YES    Care Discussed with Consultants : YES    Plan of care was discussed with patient and /or primary care giver; all questions and concerns were addressed and care was aligned with patient's wishes. YES PGY 1 Note discussed with supervising resident and primary attending    Patient is a 84y old  Male who presents with a chief complaint of 'found down and missing for 2 days' (23 Jan 2018 15:10)      INTERVAL HPI/OVERNIGHT EVENTS: patient seen and examined at bedside, no overnight events. patient is more alert today. recalls full name, date of birth and place. however is not oriented to time "im in the 4000".       MEDICATIONS  (STANDING):  ampicillin/sulbactam  IVPB 1.5 Gram(s) IV Intermittent every 12 hours  dextrose 5%. 1000 milliLiter(s) (75 mL/Hr) IV Continuous <Continuous>  dextrose 5%. 1000 milliLiter(s) (75 mL/Hr) IV Continuous <Continuous>  dextrose 5%. 1000 milliLiter(s) (75 mL/Hr) IV Continuous <Continuous>  digoxin     Tablet 0.125 milliGRAM(s) Oral daily  insulin glargine Injectable (LANTUS) 8 Unit(s) SubCutaneous at bedtime  insulin lispro (HumaLOG) corrective regimen sliding scale   SubCutaneous three times a day with meals  lactobacillus acidophilus 1 Tablet(s) Oral every 8 hours  lactulose Syrup 10 Gram(s) Oral every 8 hours  lisinopril 2.5 milliGRAM(s) Oral daily  metoprolol succinate ER 25 milliGRAM(s) Oral daily  pantoprazole  Injectable 40 milliGRAM(s) IV Push two times a day    MEDICATIONS  (PRN):  haloperidol    Injectable 2 milliGRAM(s) IV Push every 4 hours PRN agitation      __________________________________________________  REVIEW OF SYSTEMS:  could not be assessed due to encephalopathy      Vital Signs Last 24 Hrs  T(C): 36.7 (27 Jan 2018 05:12), Max: 36.7 (26 Jan 2018 14:01)  T(F): 98 (27 Jan 2018 05:12), Max: 98 (26 Jan 2018 14:01)  HR: 86 (27 Jan 2018 05:35) (86 - 126)  BP: 149/69 (27 Jan 2018 05:35) (93/66 - 159/69)  BP(mean): --  RR: 18 (27 Jan 2018 05:35) (16 - 18)  SpO2: 98% (27 Jan 2018 05:35) (96% - 100%)    ________________________________________________  PHYSICAL EXAM:  GENERAL: NAD  HEENT: Normocephalic;  conjunctivae and sclerae clear; moist mucous membranes;   NECK : supple  CHEST/LUNG: Clear to auscultation bilaterally  HEART: S1 S2  regular; no murmurs, gallops or rubs  ABDOMEN: Soft, Nontender, Nondistended; Bowel sounds present  EXTREMITIES: no ECC  SKIN: warm and dry; no rash  NERVOUS SYSTEM:  AAOx1    _________________________________________________  LABS:                        11.3   10.0  )-----------( 114      ( 27 Jan 2018 08:59 )             35.1     01-27    143  |  109<H>  |  17  ----------------------------<  175<H>  3.1<L>   |  26  |  1.11    Ca    8.2<L>      27 Jan 2018 08:25  Phos  2.2     01-27  Mg     2.0     01-27    TPro  5.4<L>  /  Alb  2.3<L>  /  TBili  0.9  /  DBili  x   /  AST  35  /  ALT  40  /  AlkPhos  75  01-27    PT/INR - ( 27 Jan 2018 08:25 )   PT: 13.7 sec;   INR: 1.25 ratio             CAPILLARY BLOOD GLUCOSE      POCT Blood Glucose.: 183 mg/dL (27 Jan 2018 08:10)  POCT Blood Glucose.: 151 mg/dL (26 Jan 2018 21:42)  POCT Blood Glucose.: 153 mg/dL (26 Jan 2018 16:26)  POCT Blood Glucose.: 141 mg/dL (26 Jan 2018 11:26)        RADIOLOGY & ADDITIONAL TESTS:      Consultant(s) Notes Reviewed:   YES    Care Discussed with Consultants : YES    Plan of care was discussed with patient and /or primary care giver; all questions and concerns were addressed and care was aligned with patient's wishes. YES

## 2018-01-27 NOTE — PROGRESS NOTE ADULT - ATTENDING COMMENTS
Patient seen and examined.  Agree with above.   -f/u GI to see if ASA and Plavix is safe for afib given bleeding risk    Gómez Loyd MD  [facility]  [address] [place] [address], [id number]  [place] [address]  office: [phone]  pager: [phone]

## 2018-01-27 NOTE — PROGRESS NOTE ADULT - SUBJECTIVE AND OBJECTIVE BOX
pt seen and examined, no complaints on exam.  pt is still agitated at times,     ampicillin/sulbactam  IVPB 1.5 Gram(s) IV Intermittent every 12 hours  dextrose 5%. 1000 milliLiter(s) IV Continuous <Continuous>  dextrose 5%. 1000 milliLiter(s) IV Continuous <Continuous>  digoxin     Tablet 0.125 milliGRAM(s) Oral daily  haloperidol    Injectable 2 milliGRAM(s) IV Push every 4 hours PRN  insulin glargine Injectable (LANTUS) 8 Unit(s) SubCutaneous at bedtime  insulin lispro (HumaLOG) corrective regimen sliding scale   SubCutaneous three times a day with meals  lactobacillus acidophilus 1 Tablet(s) Oral every 8 hours  lactulose Syrup 10 Gram(s) Oral every 8 hours  lisinopril 2.5 milliGRAM(s) Oral daily  metoprolol succinate ER 25 milliGRAM(s) Oral daily  pantoprazole  Injectable 40 milliGRAM(s) IV Push two times a day                            11.7   10.8  )-----------( 121      ( 26 Jan 2018 08:10 )             38.3       Hemoglobin: 11.7 g/dL (01-26 @ 08:10)  Hemoglobin: 12.0 g/dL (01-25 @ 07:29)  Hemoglobin: 12.6 g/dL (01-24 @ 08:13)  Hemoglobin: 12.2 g/dL (01-23 @ 16:06)  Hemoglobin: 13.1 g/dL (01-23 @ 09:10)      01-26    148<H>  |  113<H>  |  22<H>  ----------------------------<  162<H>  3.5   |  27  |  1.24    Ca    8.3<L>      26 Jan 2018 08:10  Phos  2.6     01-26  Mg     1.4     01-26    TPro  5.4<L>  /  Alb  2.4<L>  /  TBili  1.0  /  DBili  x   /  AST  41<H>  /  ALT  48  /  AlkPhos  72  01-26    Creatinine Trend: 1.24<--, 1.08<--, 1.22<--, 1.34<--, 1.46<--, SEE COMMENT TYPE:(C=Critical, N=Notification, A=Abnormal) C  TESTS: CREAT  DATE/TIME CALLED: 01/23/18 07:54  CALLED TO: LIAM CORDOVARN  READ BACK (2 Patient Identifiers)(Y/N): Y  READ BACK VALUES (Y/N): _Y  CALLED BY: BETH,01/23/18 07:55  POSSIBLE CONTAMINATION<--    COAGS: PT/INR - ( 26 Jan 2018 08:10 )   PT: 13.7 sec;   INR: 1.25 ratio         T(C): 36.6 (01-26-18 @ 21:17), Max: 36.7 (01-26-18 @ 14:01)  HR: 103 (01-26-18 @ 21:17) (93 - 103)  BP: 159/69 (01-26-18 @ 21:17) (130/51 - 159/69)  RR: 18 (01-26-18 @ 21:17) (16 - 18)  SpO2: 98% (01-26-18 @ 21:17) (97% - 100%)  Wt(kg): --    I&O's Summary    25 Jan 2018 07:01  -  26 Jan 2018 07:00  --------------------------------------------------------  IN: 0 mL / OUT: 300 mL / NET: -300 mL      HEENT:   Normal oral mucosa,   Lymphatic: No obvious lymphadenopathy , no edema  Cardiovascular: Normal S1 S2, No JVD, 1/6 PEDRO murmur, Peripheral pulses palpable 2+ bilaterally  Respiratory: Coarse mechanical BS, normal effort 	  Gastrointestinal:  Soft, Non-tender, + BS	  Skin: No rashes,  No cyanosis, warm to touch  Musculoskeletal: unable to fully assess  Psychiatry:  Confused      ECHO: < from: Transthoracic Echocardiogram (01.17.18 @ 14:22) >  CONCLUSIONS:  1. Normal mitral valve. Mild to moderate mitral  regurgitation.  2. Aortic valve not seen well.  3. Aortic Root: 3.1 cm.  4. Mild left atrial enlargement.  5. Normal left ventricular internal dimensions and wall  thicknesses.  6. Endocardium not well visualized; grossly severely  reduced l left ventricular systolic function.  7. Grade II diastolic dysfunction.  8. Normal right atrium.  9. Normal right ventricular size and function.  10. RA Pressure is 8 mm Hg.  11. RV systolic pressure is 33 mm Hg.  12. Normal tricuspid valve.  13. Normal pulmonic valve.  14. Normal pericardium with no pericardial effusion.    < end of copied text >      ASSESSMENT/PLAN: 	89y Male ? cirrhosis found down by his superintendant,  admitted with respiratory failure new rapid afib, shock, found with severe LV dyfx  of unknown duration.    cont rate control with Dig , BB- bp tolerated ACE well    GI / DVT prophylaxis.  renal follow up , hold diuresis    keep K>4, mag >2.0  Consider ASA and plavix if ok with GI since it appears he is high risk for full anticoagulation.  fall precaution / Aspiration precaution   cont Haldol per medicine   D/W Dr Loyd

## 2018-01-28 NOTE — PROGRESS NOTE ADULT - PROBLEM SELECTOR PLAN 4
AFIB presumed new onset- rate controlled; no anticoagulation for now due to recent bleeding  Digoxin + Lopressor 25 mg BID PO  Will hold AC given bleeding risk  monitor CBC   Dr Logan note appreciated

## 2018-01-28 NOTE — PROGRESS NOTE ADULT - SUBJECTIVE AND OBJECTIVE BOX
pt seen and examined, no complaints on exam.  NAD on exam     digoxin     Tablet 0.125 milliGRAM(s) Oral daily  heparin  Injectable 5000 Unit(s) SubCutaneous every 8 hours  insulin glargine Injectable (LANTUS) 8 Unit(s) SubCutaneous at bedtime  insulin lispro (HumaLOG) corrective regimen sliding scale   SubCutaneous three times a day with meals  lactobacillus acidophilus 1 Tablet(s) Oral every 8 hours  lisinopril 2.5 milliGRAM(s) Oral daily  metoprolol succinate ER 25 milliGRAM(s) Oral daily  pantoprazole    Tablet 40 milliGRAM(s) Oral before breakfast                            11.3   10.0  )-----------( 114      ( 27 Jan 2018 08:59 )             35.1       Hemoglobin: 11.3 g/dL (01-27 @ 08:59)  Hemoglobin: 11.7 g/dL (01-26 @ 08:10)  Hemoglobin: 12.0 g/dL (01-25 @ 07:29)  Hemoglobin: 12.6 g/dL (01-24 @ 08:13)  Hemoglobin: 12.2 g/dL (01-23 @ 16:06)      01-27    143  |  109<H>  |  17  ----------------------------<  175<H>  3.1<L>   |  26  |  1.11    Ca    8.2<L>      27 Jan 2018 08:25  Phos  2.2     01-27  Mg     2.0     01-27    TPro  5.4<L>  /  Alb  2.3<L>  /  TBili  0.9  /  DBili  x   /  AST  35  /  ALT  40  /  AlkPhos  75  01-27    Creatinine Trend: 1.11<--, 1.24<--, 1.08<--, 1.22<--, 1.34<--, 1.46<--    COAGS:           T(C): 36.3 (01-28-18 @ 05:25), Max: 37.2 (01-27-18 @ 21:10)  HR: 91 (01-28-18 @ 05:25) (90 - 120)  BP: 151/72 (01-28-18 @ 05:25) (117/99 - 151/72)  RR: 18 (01-28-18 @ 05:25) (16 - 18)  SpO2: 98% (01-28-18 @ 05:25) (94% - 98%)  Wt(kg): --    I&O's Summary      HEENT:   Normal oral mucosa,   Lymphatic: No obvious lymphadenopathy , no edema  Cardiovascular: Normal S1 S2, No JVD, 1/6 PEDRO murmur, Peripheral pulses palpable 2+ bilaterally  Respiratory: Coarse mechanical BS, normal effort 	  Gastrointestinal:  Soft, Non-tender, + BS	  Skin: No rashes,  No cyanosis, warm to touch  Musculoskeletal: unable to fully assess  Psychiatry:  Confused      ECHO: < from: Transthoracic Echocardiogram (01.17.18 @ 14:22) >  CONCLUSIONS:  1. Normal mitral valve. Mild to moderate mitral  regurgitation.  2. Aortic valve not seen well.  3. Aortic Root: 3.1 cm.  4. Mild left atrial enlargement.  5. Normal left ventricular internal dimensions and wall  thicknesses.  6. Endocardium not well visualized; grossly severely  reduced l left ventricular systolic function.  7. Grade II diastolic dysfunction.  8. Normal right atrium.  9. Normal right ventricular size and function.  10. RA Pressure is 8 mm Hg.  11. RV systolic pressure is 33 mm Hg.  12. Normal tricuspid valve.  13. Normal pulmonic valve.  14. Normal pericardium with no pericardial effusion.    < end of copied text >      ASSESSMENT/PLAN: 	89y Male ? cirrhosis found down by his superintendant,  admitted with respiratory failure new rapid afib, shock, found with severe LV dyfx  of unknown duration.    cont rate control with Dig , BB- bp tolerated ACE well    GI / DVT prophylaxis.  renal follow up , hold diuresis    keep K>4, mag >2.0  Consider ASA and plavix if ok with GI since it appears he is high risk for full anticoagulation.  fall precaution / Aspiration precaution   cont Haldol per medicine   D/W Dr Loyd

## 2018-01-28 NOTE — PROGRESS NOTE ADULT - ASSESSMENT
84 M with  PMH  DM, HTN, cirrhosis is BIBEMS after being found on floor in respiratory distress after 2 days, and with significant metabolic acidosis from ARF with oliguria from dehydration,  lactic acidosis and fevers. Pt admitted to MICU for Acute Hypoxic  Respiratory Failure  and shock in setting of likely sepsis vs hypovolemia. Thoughts of HRS but pt responded to fluid resuscitation with albumin and Octreotide. Pt started having good UO and kidney function started to improved, pt was transferred to [facility] for further monitor     cousin Andrew Barnes: [phone]  cousin in law Sorin Barnes [phone]

## 2018-01-28 NOTE — PROGRESS NOTE ADULT - SUBJECTIVE AND OBJECTIVE BOX
CC: No SOB, fever or chills.    Vital Signs Last 24 Hrs  T(C): 37.1 (28 Jan 2018 13:57), Max: 37.2 (27 Jan 2018 21:10)  T(F): 98.7 (28 Jan 2018 13:57), Max: 98.9 (27 Jan 2018 21:10)  HR: 92 (28 Jan 2018 13:57) (91 - 120)  BP: 134/82 (28 Jan 2018 13:57) (117/99 - 151/72)  BP(mean): --  RR: 17 (28 Jan 2018 13:57) (17 - 18)  SpO2: 98% (28 Jan 2018 13:57) (94% - 98%)    PHYSICAL EXAM:    Constitutional: well developed, well nourished  and in nad  Neck :supple, no JVD  Respiratory: CTAB  Cardiovascular: S1, s2 regular  Gastrointestinal: soft, non tender non distended and nl bs heard  Extremities: no edema    MEDICATIONS:  digoxin     Tablet 0.125 milliGRAM(s) Oral daily  heparin  Injectable 5000 Unit(s) SubCutaneous every 8 hours  insulin glargine Injectable (LANTUS) 8 Unit(s) SubCutaneous at bedtime  insulin lispro (HumaLOG) corrective regimen sliding scale   SubCutaneous three times a day with meals  lactobacillus acidophilus 1 Tablet(s) Oral every 8 hours  lisinopril 2.5 milliGRAM(s) Oral daily  metoprolol succinate ER 25 milliGRAM(s) Oral daily  pantoprazole    Tablet 40 milliGRAM(s) Oral before breakfast      LABS:                        12.2   8.6   )-----------( 108      ( 28 Jan 2018 07:53 )             39.0     01-28    145  |  110<H>  |  14  ----------------------------<  123<H>  3.1<L>   |  24  |  0.94    Ca    8.3<L>      28 Jan 2018 07:53  Phos  2.4     01-28  Mg     1.8     01-28    TPro  5.8<L>  /  Alb  2.3<L>  /  TBili  0.9  /  DBili  x   /  AST  32  /  ALT  34  /  AlkPhos  75  01-28    PT/INR - ( 27 Jan 2018 08:25 )   PT: 13.7 sec;   INR: 1.25 ratio       Magnesium, Serum: 1.8 mg/dL (01-28 @ 07:53)  Phosphorus Level, Serum: 2.4 mg/dL (01-28 @ 07:53)    ASSESSMENT AND PLAN:   ALONDRA resolved.  Continue current care.  Please reconsult as needed.

## 2018-01-28 NOTE — PROGRESS NOTE ADULT - ATTENDING COMMENTS
Agree with above.   -F/u GI to see if dapt is safe given bleeding risk    Gómez Loyd MD  [facility]  [address], [id number]  [place] [address]  office: [phone]  pager: [phone]

## 2018-01-28 NOTE — PROGRESS NOTE ADULT - SUBJECTIVE AND OBJECTIVE BOX
Patient is a 84y old  Male who presents with a chief complaint of acute hypoxic respiratory failure and metabolic encephalopathy (23 Jan 2018 15:10)      INTERVAL HPI/OVERNIGHT EVENTS: no new complaints    MEDICATIONS  (STANDING):  digoxin     Tablet 0.125 milliGRAM(s) Oral daily  heparin  Injectable 5000 Unit(s) SubCutaneous every 8 hours  insulin glargine Injectable (LANTUS) 8 Unit(s) SubCutaneous at bedtime  insulin lispro (HumaLOG) corrective regimen sliding scale   SubCutaneous three times a day with meals  lactobacillus acidophilus 1 Tablet(s) Oral every 8 hours  lisinopril 2.5 milliGRAM(s) Oral daily  metoprolol succinate ER 25 milliGRAM(s) Oral daily  pantoprazole    Tablet 40 milliGRAM(s) Oral before breakfast    MEDICATIONS  (PRN):      __________________________________________________  REVIEW OF SYSTEMS:    CONSTITUTIONAL: No fever,   EYES: no acute visual disturbances  NECK: No pain or stiffness  RESPIRATORY: No cough; No shortness of breath  CARDIOVASCULAR: No chest pain, no palpitations  GASTROINTESTINAL: No pain. No nausea or vomiting; No diarrhea   NEUROLOGICAL: No headache or numbness, no tremors  MUSCULOSKELETAL: No joint pain, no muscle pain  GENITOURINARY: no dysuria, no frequency, no hesitancy  PSYCHIATRY: no depression , no anxiety  ALL OTHER  ROS negative        Vital Signs Last 24 Hrs  T(C): 37.6 (28 Jan 2018 21:45), Max: 37.6 (28 Jan 2018 21:45)  T(F): 99.7 (28 Jan 2018 21:45), Max: 99.7 (28 Jan 2018 21:45)  HR: 81 (28 Jan 2018 21:45) (81 - 97)  BP: 144/83 (28 Jan 2018 21:45) (134/82 - 151/72)  BP(mean): --  RR: 17 (28 Jan 2018 21:45) (17 - 18)  SpO2: 99% (28 Jan 2018 21:45) (98% - 99%)    ________________________________________________  PHYSICAL EXAM:  GENERAL: NAD  HEENT: Normocephalic;  conjunctivae and sclerae clear; moist mucous membranes;   NECK : supple  CHEST/LUNG: Clear to auscultation bilaterally with good air entry   HEART: S1 S2  regular; no murmurs, gallops or rubs  ABDOMEN: Soft, Nontender, Nondistended; Bowel sounds present  EXTREMITIES: no cyanosis; no edema; no calf tenderness  SKIN: warm and dry; no rash  NERVOUS SYSTEM:  Awake and alert; Oriented  to place, person and time ; no new deficits    _________________________________________________  LABS:                        12.2   8.6   )-----------( 108      ( 28 Jan 2018 07:53 )             39.0     01-28    145  |  110<H>  |  14  ----------------------------<  123<H>  3.1<L>   |  24  |  0.94    Ca    8.3<L>      28 Jan 2018 07:53  Phos  2.4     01-28  Mg     1.8     01-28    TPro  5.8<L>  /  Alb  2.3<L>  /  TBili  0.9  /  DBili  x   /  AST  32  /  ALT  34  /  AlkPhos  75  01-28    PT/INR - ( 27 Jan 2018 08:25 )   PT: 13.7 sec;   INR: 1.25 ratio             CAPILLARY BLOOD GLUCOSE      POCT Blood Glucose.: 95 mg/dL (28 Jan 2018 21:20)  POCT Blood Glucose.: 95 mg/dL (28 Jan 2018 16:45)  POCT Blood Glucose.: 121 mg/dL (28 Jan 2018 12:07)  POCT Blood Glucose.: 108 mg/dL (28 Jan 2018 07:52)        RADIOLOGY & ADDITIONAL TESTS:    Imaging Personally Reviewed:  YES/NO    Consultant(s) Notes Reviewed:   YES/ No    Care Discussed with Consultants :     Plan of care was discussed with patient and /or primary care giver; all questions and concerns were addressed and care was aligned with patient's wishes. Patient is a 84y old  Male who presents with a chief complaint of acute hypoxic respiratory failure and metabolic encephalopathy (23 Jan 2018 15:10)      INTERVAL HPI/OVERNIGHT EVENTS: Patient is comfortable. Denies chest pain, sob, LH. no new complaints    MEDICATIONS  (STANDING):  digoxin     Tablet 0.125 milliGRAM(s) Oral daily  heparin  Injectable 5000 Unit(s) SubCutaneous every 8 hours  insulin glargine Injectable (LANTUS) 8 Unit(s) SubCutaneous at bedtime  insulin lispro (HumaLOG) corrective regimen sliding scale   SubCutaneous three times a day with meals  lactobacillus acidophilus 1 Tablet(s) Oral every 8 hours  lisinopril 2.5 milliGRAM(s) Oral daily  metoprolol succinate ER 25 milliGRAM(s) Oral daily  pantoprazole    Tablet 40 milliGRAM(s) Oral before breakfast    MEDICATIONS  (PRN):      __________________________________________________  REVIEW OF SYSTEMS:    CONSTITUTIONAL: No fever,   EYES: no acute visual disturbances  NECK: No pain or stiffness  RESPIRATORY: No cough; No shortness of breath  CARDIOVASCULAR: No chest pain, no palpitations  GASTROINTESTINAL: No pain. No nausea or vomiting; No diarrhea   NEUROLOGICAL: No headache or numbness, no tremors  MUSCULOSKELETAL: No joint pain, no muscle pain  GENITOURINARY: no dysuria, no frequency, no hesitancy  PSYCHIATRY: no depression , no anxiety  ALL OTHER  ROS negative        Vital Signs Last 24 Hrs  T(C): 37.6 (28 Jan 2018 21:45), Max: 37.6 (28 Jan 2018 21:45)  T(F): 99.7 (28 Jan 2018 21:45), Max: 99.7 (28 Jan 2018 21:45)  HR: 81 (28 Jan 2018 21:45) (81 - 97)  BP: 144/83 (28 Jan 2018 21:45) (134/82 - 151/72)  BP(mean): --  RR: 17 (28 Jan 2018 21:45) (17 - 18)  SpO2: 99% (28 Jan 2018 21:45) (98% - 99%)    ________________________________________________  PHYSICAL EXAM:  GENERAL: NAD, lying comfortably flat in bed  HEENT: Normocephalic;  conjunctivae and sclerae clear; moist mucous membranes;   NECK : supple  CHEST/LUNG: Clear to auscultation bilaterally with good air entry   HEART: S1 S2  regular; no murmurs, gallops or rubs  ABDOMEN: Soft, Nontender, Nondistended; Bowel sounds present  EXTREMITIES: no cyanosis; no edema; no calf tenderness  SKIN: warm and dry; no rash  NERVOUS SYSTEM:  Awake and alert; Oriented  to place, person and time ; no new deficits    _________________________________________________  LABS:                        12.2   8.6   )-----------( 108      ( 28 Jan 2018 07:53 )             39.0     01-28    145  |  110<H>  |  14  ----------------------------<  123<H>  3.1<L>   |  24  |  0.94    Ca    8.3<L>      28 Jan 2018 07:53  Phos  2.4     01-28  Mg     1.8     01-28    TPro  5.8<L>  /  Alb  2.3<L>  /  TBili  0.9  /  DBili  x   /  AST  32  /  ALT  34  /  AlkPhos  75  01-28    PT/INR - ( 27 Jan 2018 08:25 )   PT: 13.7 sec;   INR: 1.25 ratio             CAPILLARY BLOOD GLUCOSE      POCT Blood Glucose.: 95 mg/dL (28 Jan 2018 21:20)  POCT Blood Glucose.: 95 mg/dL (28 Jan 2018 16:45)  POCT Blood Glucose.: 121 mg/dL (28 Jan 2018 12:07)  POCT Blood Glucose.: 108 mg/dL (28 Jan 2018 07:52)        RADIOLOGY & ADDITIONAL TESTS:    Imaging Personally Reviewed:  YES/NO    Consultant(s) Notes Reviewed:   YES/ No    Care Discussed with Consultants :     Plan of care was discussed with patient and /or primary care giver; all questions and concerns were addressed and care was aligned with patient's wishes.

## 2018-01-29 NOTE — SWALLOW BEDSIDE ASSESSMENT ADULT - H & P REVIEW
yes
Pt seen for bedside swallow evaluation, known from prior eval, recent notes appreciated. EMR, labs, radiology reviewed./yes

## 2018-01-29 NOTE — PROGRESS NOTE ADULT - ASSESSMENT
84 M PMH  DM, HTN, Cirrhosis is BIBEMS after being found on floor in respiratory distress after 2 days and with significant metabolic acidosis from ARF with oliguria from dehydration,  lactic acidosis and fevers. - t admitted to MICU for Acute Hypoxic  Respiratory Failure  and shock in setting of likely sepsis vs hypovolemia. Thoughts of HRS but pt responded to fluid resuscitation with albumin and Octreotide. Pt started having good UO and kidney function started to improved, pt was transferred to [facility] for further monitor     cousin Andrew Barnes: [phone]  cousin in law Sorin Barnes [phone] 84 M PMH DM, HTN, Cirrhosis is BIB EMS after being found on floor in respiratory distress x 2 days with significant metabolic acidosis from ARF with oliguria from dehydration, lactic acidosis and fevers - admitted to MICU for Acute Hypoxic Respiratory Failure 2/2 to Sepsis and Hypovolemia - responded well to fluid resuscitation, octreotide, and albumin but found to have severe LV dysfunction (EF 25-30% - downgraded for continued medical care  - Pending VENUS placement, no active issues

## 2018-01-29 NOTE — PROGRESS NOTE ADULT - PROBLEM SELECTOR PLAN 6
IMPROVE VTE score 2  Need for DVT ppx, hold AC due to high bleeding risk  GI PPX on protonix IMPROVE VTE Score: 3, DVT PPx w/ Lovenox 40 mg  [x] Immobilization > 24 hrs  [x] ICU/CCU stay > 24 hours  [x] Age > 60

## 2018-01-29 NOTE — PROGRESS NOTE ADULT - PROBLEM SELECTOR PLAN 1
Sodium levels within normal range now. Resolved w/ D5W  - Nephrology Dr Moya  ***Right renal complex cyst noted, recommend repeat renal US in 3 months

## 2018-01-29 NOTE — SWALLOW BEDSIDE ASSESSMENT ADULT - SWALLOW EVAL: DIAGNOSIS
Pt suspected oropharyngeal dysphagia. Pt exhibited open mouth posture at rest. Did not accept bolus. Held food in mouth. Spat out trials of foods. Swallow elicited only during oral cleaning w/oral swab. Initiation of swallow delayed w/reduced laryngeal elevation. Unable to assess vocal quality due to limited pt participation/comprehension.
Pt p/w psychogenic dysphagia, characterized by oral defensiveness & poor oral aperture, However, oropharyngeal swallow of thin liquids was intact and timely w/ no overt s/s of penetration/aspiration at this exam.

## 2018-01-29 NOTE — SWALLOW BEDSIDE ASSESSMENT ADULT - SLP PERTINENT HISTORY OF CURRENT PROBLEM
89 male with unknown PMH was found down in his apartment, brought to the ED and found to be febrile, with ALONDRA, dehydration, afib with RVR, encephalopathy.  After being fluid resuscitated patient developed acute respiratory failure secondary to pulmonary edema.
89 male with unknown PMH was found down in his apartment, brought to the ED and found to be febrile, with ALONDRA, dehydration, afib with RVR, encephalopathy.  After being fluid resuscitated patient developed acute respiratory failure secondary to pulmonary edema.

## 2018-01-29 NOTE — PROGRESS NOTE ADULT - ATTENDING COMMENTS
Patient was seen and examined by myself. Case was discussed with house staff in details. I have reviewed and agree with the plan as outlined above with edits where appropriate.  Plan of care as above; clinically stable and improving.  Repeat swallowing assessment done; advance diet .  discharge planning. discuss anticoagulation with GI and Cardiology- Eliquis  or ASA and Plavix for AFIB . Patient with recent GI bleed following heparin drip for new onset AFIB. Bleeding was presumed due to coagulopathy from liver disease ( liver cirrhosis) in the setting of acute illness requirig ICU care.  Overall prognosis is guarded.  Discharge planning.

## 2018-01-29 NOTE — SWALLOW BEDSIDE ASSESSMENT ADULT - COMMENTS
HOB elevated to 45 degrees, A+Ox2, confused, resistant to exam. Cousin & cousin-in-law present, assisted with cues & encouragement for Pt to participate. First bolus, pt held in mouth, subsequent delayed swallow elicited. Second bolus, pt spat trial bolus onto napkin oral defensive,  pt spit out  & refused trial.

## 2018-01-29 NOTE — PROGRESS NOTE ADULT - PROBLEM SELECTOR PLAN 4
AFIB presumed new onset- rate controlled; no anticoagulation for now due to recent bleeding  Digoxin + Lopressor 25 mg BID PO  Will hold AC given bleeding risk  monitor CBC   Dr Logan note appreciated Presumed new onset  - C/w Digoxin + Lopressor 25 mg BID PO for rate and rhythm control  - No AC 2/2 concern for high risk of bleeding; had episode of BRBPR but H&H stable during admission  Cardiology Dr. Logan

## 2018-01-29 NOTE — PROGRESS NOTE ADULT - PROBLEM SELECTOR PLAN 2
resolved US abdomen supports cirrhosis w/ MELD score 15  - Patient to f/u w/ [facility] as outpatient in 2 weeks  ***Resolved; 378/113 at admission, now normal.

## 2018-01-29 NOTE — PROGRESS NOTE ADULT - SUBJECTIVE AND OBJECTIVE BOX
Patient denies CP, SOB ROS(-) but limited due to mental status    digoxin     Tablet 0.125 milliGRAM(s) Oral daily  heparin  Injectable 5000 Unit(s) SubCutaneous every 8 hours  insulin glargine Injectable (LANTUS) 8 Unit(s) SubCutaneous at bedtime  insulin lispro (HumaLOG) corrective regimen sliding scale   SubCutaneous three times a day with meals  lactobacillus acidophilus 1 Tablet(s) Oral every 8 hours  lisinopril 2.5 milliGRAM(s) Oral daily  metoprolol succinate ER 25 milliGRAM(s) Oral daily  pantoprazole    Tablet 40 milliGRAM(s) Oral before breakfast                            13.0   5.9   )-----------( 130      ( 29 Jan 2018 06:30 )             43.3       Hemoglobin: 13.0 g/dL (01-29 @ 06:30)  Hemoglobin: 12.2 g/dL (01-28 @ 07:53)  Hemoglobin: 11.3 g/dL (01-27 @ 08:59)  Hemoglobin: 11.7 g/dL (01-26 @ 08:10)  Hemoglobin: 12.0 g/dL (01-25 @ 07:29)      01-29    145  |  108  |  14  ----------------------------<  97  4.2   |  27  |  0.93    Ca    8.8      29 Jan 2018 06:30  Phos  2.4     01-28  Mg     1.8     01-28    TPro  5.8<L>  /  Alb  2.3<L>  /  TBili  0.9  /  DBili  x   /  AST  32  /  ALT  34  /  AlkPhos  75  01-28    Creatinine Trend: 0.93<--, 0.94<--, 1.11<--, 1.24<--, 1.08<--, 1.22<--    COAGS:           T(C): 36.2 (01-29-18 @ 06:13), Max: 37.6 (01-28-18 @ 21:45)  HR: 85 (01-29-18 @ 10:59) (81 - 93)  BP: 139/96 (01-29-18 @ 10:59) (134/82 - 147/85)  RR: 18 (01-29-18 @ 06:13) (17 - 18)  SpO2: 96% (01-29-18 @ 10:59) (96% - 99%)  Wt(kg): --    I&O's Summary        HEENT:   Normal oral mucosa,   Lymphatic: No obvious lymphadenopathy , no edema  Cardiovascular: Normal S1 S2, No JVD, 1/6 PEDRO murmur, Peripheral pulses palpable 2+ bilaterally  Respiratory: Coarse mechanical BS, normal effort 	  Gastrointestinal:  Soft, Non-tender, + BS	  Skin: No rashes,  No cyanosis, warm to touch  Musculoskeletal: unable to fully assess  Psychiatry:  Confused      ECHO: < from: Transthoracic Echocardiogram (01.17.18 @ 14:22) >  CONCLUSIONS:  1. Normal mitral valve. Mild to moderate mitral  regurgitation.  2. Aortic valve not seen well.  3. Aortic Root: 3.1 cm.  4. Mild left atrial enlargement.  5. Normal left ventricular internal dimensions and wall  thicknesses.  6. Endocardium not well visualized; grossly severely  reduced l left ventricular systolic function.  7. Grade II diastolic dysfunction.  8. Normal right atrium.  9. Normal right ventricular size and function.  10. RA Pressure is 8 mm Hg.  11. RV systolic pressure is 33 mm Hg.  12. Normal tricuspid valve.  13. Normal pulmonic valve.  14. Normal pericardium with no pericardial effusion.    < end of copied text >      ASSESSMENT/PLAN: 	89y Male ? cirrhosis found down by his superintendant,  admitted with respiratory failure new rapid afib, shock, found with severe LV dyfx  of unknown duration.    cont rate control with Dig , BB- bp tolerated ACE well    GI / DVT prophylaxis.  renal follow up , hold diuresis    keep K>4, mag >2.0  Consider ASA and plavix if ok with GI since it appears he is high risk for full anticoagulation.  fall precaution / Aspiration precaution   cont Haldol per medicine   not a candidate for ischemic eval or aggressive cardiac intervention given confusion    Donald Logan MD, [facility]  [facility], [facility]  [address] [place] [address]  PHONE:  [phone]  BEEPER : [phone]

## 2018-01-29 NOTE — SWALLOW BEDSIDE ASSESSMENT ADULT - SPECIFY REASON(S)
Re-evaluation of current swallow function in order to assess if current diet or advancement is appropriate

## 2018-01-29 NOTE — SWALLOW BEDSIDE ASSESSMENT ADULT - SWALLOW EVAL: VELAR ELEVATION
Unable to assess due to pt's ability to participate/comprehend
Unable to assess due to pt's ability to participate/comprehend

## 2018-01-29 NOTE — SWALLOW BEDSIDE ASSESSMENT ADULT - ASR SWALLOW LINGUAL MOBILITY
Unable to assess due to pt's ability to participate/comprehend
Apeears WFL, but Unable to fully assess due to pt's ability to participate/comprehend

## 2018-01-29 NOTE — SWALLOW BEDSIDE ASSESSMENT ADULT - ASR SWALLOW ASPIRATION MONITOR
fever/pneumonia/throat clearing/change of breathing pattern/position upright (89Y)/gurgly voice/oral hygiene/upper respiratory infection/cough

## 2018-01-29 NOTE — SWALLOW BEDSIDE ASSESSMENT ADULT - NS SPL SWALLOW CLINIC TRIAL FT
Pt held ice chip in mouth. Swallow not triggered. Pt spat out water after ice chip melted.
Pt held ice chip in mouth, subsequently spit out and threw ice at clinician.

## 2018-01-29 NOTE — PROGRESS NOTE ADULT - SUBJECTIVE AND OBJECTIVE BOX
PGY 1 Note discussed with supervising resident and primary attending    Patient is a 84y old  Male who presents with a chief complaint of acute hypoxic respiratory failure and metabolic encephalopathy (23 Jan 2018 15:10)      INTERVAL HPI/OVERNIGHT EVENTS: Patient seen and examined at bedside with no new complaints    MEDICATIONS  (STANDING):  digoxin     Tablet 0.125 milliGRAM(s) Oral daily  heparin  Injectable 5000 Unit(s) SubCutaneous every 8 hours  insulin glargine Injectable (LANTUS) 8 Unit(s) SubCutaneous at bedtime  insulin lispro (HumaLOG) corrective regimen sliding scale   SubCutaneous three times a day with meals  lactobacillus acidophilus 1 Tablet(s) Oral every 8 hours  lisinopril 2.5 milliGRAM(s) Oral daily  metoprolol succinate ER 25 milliGRAM(s) Oral daily  pantoprazole    Tablet 40 milliGRAM(s) Oral before breakfast    MEDICATIONS  (PRN):      __________________________________________________  REVIEW OF SYSTEMS:    CONSTITUTIONAL: No fever,   EYES: No acute visual disturbances  NECK: No pain or stiffness  RESPIRATORY: No cough; No shortness of breath  CARDIOVASCULAR: No chest pain, no palpitations  GASTROINTESTINAL: No pain. No nausea or vomiting; No diarrhea   NEUROLOGICAL: No headache or numbness, no tremors  MUSCULOSKELETAL: No joint pain, no muscle pain  GENITOURINARY: No dysuria, no frequency, no hesitancy  PSYCHIATRY: No depression , no anxiety  ALL OTHER  ROS negative        Vital Signs Last 24 Hrs  T(C): 36.5 (29 Jan 2018 13:48), Max: 37.6 (28 Jan 2018 21:45)  T(F): 97.7 (29 Jan 2018 13:48), Max: 99.7 (28 Jan 2018 21:45)  HR: 90 (29 Jan 2018 13:48) (81 - 93)  BP: 140/84 (29 Jan 2018 13:48) (139/96 - 147/85)  BP(mean): --  RR: 18 (29 Jan 2018 13:48) (17 - 18)  SpO2: 96% (29 Jan 2018 13:48) (96% - 99%)    ________________________________________________  PHYSICAL EXAM:  GENERAL: NAD  HEENT: Normocephalic;  conjunctivae and sclerae clear; moist mucous membranes;   NECK : supple  CHEST/LUNG: Clear to auscultation bilaterally with good air entry   HEART: S1 S2  regular; no murmurs, gallops or rubs  ABDOMEN: Soft, Nontender, Nondistended; Bowel sounds present  EXTREMITIES: No cyanosis; no edema; no calf tenderness  NERVOUS SYSTEM:  Awake and alert; Oriented  to place, person and time ; no new deficits    _________________________________________________  LABS:                        13.0   5.9   )-----------( 130      ( 29 Jan 2018 06:30 )             43.3     01-29    145  |  108  |  14  ----------------------------<  97  4.2   |  27  |  0.93    Ca    8.8      29 Jan 2018 06:30  Phos  2.4     01-28  Mg     1.8     01-28    TPro  5.8<L>  /  Alb  2.3<L>  /  TBili  0.9  /  DBili  x   /  AST  32  /  ALT  34  /  AlkPhos  75  01-28        CAPILLARY BLOOD GLUCOSE      POCT Blood Glucose.: 93 mg/dL (29 Jan 2018 16:19)  POCT Blood Glucose.: 79 mg/dL (29 Jan 2018 11:58)  POCT Blood Glucose.: 90 mg/dL (29 Jan 2018 07:33)  POCT Blood Glucose.: 95 mg/dL (28 Jan 2018 21:20)        RADIOLOGY & ADDITIONAL TESTS:    Imaging Personally Reviewed:  YES    Consultant(s) Notes Reviewed:   YES    Care Discussed with Consultants : YES    Plan of care was discussed with patient and /or primary care giver; all questions and concerns were addressed and care was aligned with patient's wishes. PGY 1 Note discussed with supervising resident and primary attending    Patient is a 84y old  Male who presents with a chief complaint of acute hypoxic respiratory failure and metabolic encephalopathy (23 Jan 2018 15:10)      INTERVAL HPI/OVERNIGHT EVENTS: Patient seen and examined at bedside with no new complaints - pending VNEUS placement    MEDICATIONS  (STANDING):  digoxin     Tablet 0.125 milliGRAM(s) Oral daily  heparin  Injectable 5000 Unit(s) SubCutaneous every 8 hours  insulin glargine Injectable (LANTUS) 8 Unit(s) SubCutaneous at bedtime  insulin lispro (HumaLOG) corrective regimen sliding scale   SubCutaneous three times a day with meals  lactobacillus acidophilus 1 Tablet(s) Oral every 8 hours  lisinopril 2.5 milliGRAM(s) Oral daily  metoprolol succinate ER 25 milliGRAM(s) Oral daily  pantoprazole    Tablet 40 milliGRAM(s) Oral before breakfast    MEDICATIONS  (PRN):      __________________________________________________  REVIEW OF SYSTEMS:    CONSTITUTIONAL: No fever,   EYES: No acute visual disturbances  NECK: No pain or stiffness  RESPIRATORY: No cough; No shortness of breath  CARDIOVASCULAR: No chest pain, no palpitations  GASTROINTESTINAL: No pain. No nausea or vomiting; No diarrhea   NEUROLOGICAL: No headache or numbness, no tremors  MUSCULOSKELETAL: No joint pain, no muscle pain  GENITOURINARY: No dysuria, no frequency, no hesitancy  PSYCHIATRY: No depression , no anxiety  ALL OTHER  ROS negative        Vital Signs Last 24 Hrs  T(C): 36.5 (29 Jan 2018 13:48), Max: 37.6 (28 Jan 2018 21:45)  T(F): 97.7 (29 Jan 2018 13:48), Max: 99.7 (28 Jan 2018 21:45)  HR: 90 (29 Jan 2018 13:48) (81 - 93)  BP: 140/84 (29 Jan 2018 13:48) (139/96 - 147/85)  BP(mean): --  RR: 18 (29 Jan 2018 13:48) (17 - 18)  SpO2: 96% (29 Jan 2018 13:48) (96% - 99%)    ________________________________________________  PHYSICAL EXAM:  GENERAL: NAD  HEENT: Normocephalic;  conjunctivae and sclerae clear; moist mucous membranes;   NECK : supple  CHEST/LUNG: Clear to auscultation bilaterally with good air entry   HEART: S1 S2  regular; no murmurs, gallops or rubs  ABDOMEN: Soft, Nontender, Nondistended; Bowel sounds present  EXTREMITIES: No cyanosis; no edema; no calf tenderness  NERVOUS SYSTEM:  Awake and alert; Oriented  to place, person and time ; no new deficits    _________________________________________________  LABS:                        13.0   5.9   )-----------( 130      ( 29 Jan 2018 06:30 )             43.3     01-29    145  |  108  |  14  ----------------------------<  97  4.2   |  27  |  0.93    Ca    8.8      29 Jan 2018 06:30  Phos  2.4     01-28  Mg     1.8     01-28    TPro  5.8<L>  /  Alb  2.3<L>  /  TBili  0.9  /  DBili  x   /  AST  32  /  ALT  34  /  AlkPhos  75  01-28        CAPILLARY BLOOD GLUCOSE      POCT Blood Glucose.: 93 mg/dL (29 Jan 2018 16:19)  POCT Blood Glucose.: 79 mg/dL (29 Jan 2018 11:58)  POCT Blood Glucose.: 90 mg/dL (29 Jan 2018 07:33)  POCT Blood Glucose.: 95 mg/dL (28 Jan 2018 21:20)        RADIOLOGY & ADDITIONAL TESTS:    Imaging Personally Reviewed:  YES    Consultant(s) Notes Reviewed:   YES    Care Discussed with Consultants : YES    Plan of care was discussed with patient and /or primary care giver; all questions and concerns were addressed and care was aligned with patient's wishes. PGY 1 Note discussed with supervising resident and primary attending    Patient is a 84y old  Male who presents with a chief complaint of acute hypoxic respiratory failure and metabolic encephalopathy (23 Jan 2018 15:10)      INTERVAL HPI/OVERNIGHT EVENTS: Patient seen and examined at bedside with no new complaints - pending VENUS placement    MEDICATIONS  (STANDING):  digoxin     Tablet 0.125 milliGRAM(s) Oral daily  heparin  Injectable 5000 Unit(s) SubCutaneous every 8 hours  insulin glargine Injectable (LANTUS) 8 Unit(s) SubCutaneous at bedtime  insulin lispro (HumaLOG) corrective regimen sliding scale   SubCutaneous three times a day with meals  lactobacillus acidophilus 1 Tablet(s) Oral every 8 hours  lisinopril 2.5 milliGRAM(s) Oral daily  metoprolol succinate ER 25 milliGRAM(s) Oral daily  pantoprazole    Tablet 40 milliGRAM(s) Oral before breakfast    MEDICATIONS  (PRN):      __________________________________________________  REVIEW OF SYSTEMS: limited 2/2 mental status    RESPIRATORY: No cough; No shortness of breath  CARDIOVASCULAR: No chest pain, no palpitations  GASTROINTESTINAL: No pain. No nausea or vomiting; No diarrhea     Vital Signs Last 24 Hrs  T(C): 36.5 (29 Jan 2018 13:48), Max: 37.6 (28 Jan 2018 21:45)  T(F): 97.7 (29 Jan 2018 13:48), Max: 99.7 (28 Jan 2018 21:45)  HR: 90 (29 Jan 2018 13:48) (81 - 93)  BP: 140/84 (29 Jan 2018 13:48) (139/96 - 147/85)  BP(mean): --  RR: 18 (29 Jan 2018 13:48) (17 - 18)  SpO2: 96% (29 Jan 2018 13:48) (96% - 99%)    ________________________________________________  PHYSICAL EXAM:  GENERAL: NAD  HEENT: Normocephalic;  conjunctivae and sclerae clear; moist mucous membranes;   NECK : supple  CHEST/LUNG: Clear to auscultation bilaterally with good air entry   HEART: S1 S2  regular; no murmurs, gallops or rubs  ABDOMEN: Soft, Nontender, Nondistended; Bowel sounds present  EXTREMITIES: No cyanosis; no edema; no calf tenderness  NERVOUS SYSTEM:  Awake and alert; Oriented to place and person  _________________________________________________  LABS:                        13.0   5.9   )-----------( 130      ( 29 Jan 2018 06:30 )             43.3     01-29    145  |  108  |  14  ----------------------------<  97  4.2   |  27  |  0.93    Ca    8.8      29 Jan 2018 06:30  Phos  2.4     01-28  Mg     1.8     01-28    TPro  5.8<L>  /  Alb  2.3<L>  /  TBili  0.9  /  DBili  x   /  AST  32  /  ALT  34  /  AlkPhos  75  01-28        CAPILLARY BLOOD GLUCOSE      POCT Blood Glucose.: 93 mg/dL (29 Jan 2018 16:19)  POCT Blood Glucose.: 79 mg/dL (29 Jan 2018 11:58)  POCT Blood Glucose.: 90 mg/dL (29 Jan 2018 07:33)  POCT Blood Glucose.: 95 mg/dL (28 Jan 2018 21:20)        RADIOLOGY & ADDITIONAL TESTS:    Imaging Personally Reviewed:  YES    Consultant(s) Notes Reviewed:   YES    Care Discussed with Consultants : YES    Plan of care was discussed with patient and /or primary care giver; all questions and concerns were addressed and care was aligned with patient's wishes.

## 2018-01-29 NOTE — SWALLOW BEDSIDE ASSESSMENT ADULT - SWALLOW EVAL: RECOMMENDED DIET
Unable to recommend a diet at this time due to insufficient trials of foods; pt spat out foods
Puree with thin liquids

## 2018-01-29 NOTE — SWALLOW BEDSIDE ASSESSMENT ADULT - ORAL PHASE
Delayed oral transit time/prolonged oral bolus holding/Decreased anterior-posterior movement of the bolus/Lingual stasis Within functional limits

## 2018-01-29 NOTE — PROGRESS NOTE ADULT - PROBLEM SELECTOR PLAN 3
resolving  pt was started on lactulose 10 mg q8hrs, but later discontinued BMP wnls; resolved w/ trial of Lactulose; AAOx2 person and place  - [place] speaking, independently living at home prior to admission

## 2018-01-29 NOTE — SWALLOW BEDSIDE ASSESSMENT ADULT - ORAL PREPARATORY PHASE
Refuses to accept bolus into oral cavity Refuses to accept bolus into oral cavity/Reduced oral grading Reduced oral grading

## 2018-01-29 NOTE — PROGRESS NOTE ADULT - PROBLEM SELECTOR PLAN 5
Lantus 8U HS, HSS FS AC HS  HbA1C: 7.5% POC Glc wnls, low 100s  - C/w Lantus 8 U and ISS  - Hb A1C 7.5%

## 2018-01-30 NOTE — PROGRESS NOTE ADULT - PROBLEM SELECTOR PLAN 3
BMP wnls; resolved w/ trial of Lactulose; AAOx2 person and place  - Citizen of [place] speaking, independently living at home prior to admission

## 2018-01-30 NOTE — PROGRESS NOTE ADULT - SUBJECTIVE AND OBJECTIVE BOX
pt seen and examined, no complaints on exam.  NAD on exam     digoxin     Tablet 0.125 milliGRAM(s) Oral daily  heparin  Injectable 5000 Unit(s) SubCutaneous every 8 hours  insulin glargine Injectable (LANTUS) 8 Unit(s) SubCutaneous at bedtime  insulin lispro (HumaLOG) corrective regimen sliding scale   SubCutaneous three times a day with meals  lactobacillus acidophilus 1 Tablet(s) Oral every 8 hours  lisinopril 2.5 milliGRAM(s) Oral daily  metoprolol succinate ER 25 milliGRAM(s) Oral daily  pantoprazole    Tablet 40 milliGRAM(s) Oral before breakfast                            12.2   6.0   )-----------( 167      ( 30 Jan 2018 06:15 )             39.4       Hemoglobin: 12.2 g/dL (01-30 @ 06:15)  Hemoglobin: 13.0 g/dL (01-29 @ 06:30)  Hemoglobin: 12.2 g/dL (01-28 @ 07:53)  Hemoglobin: 11.3 g/dL (01-27 @ 08:59)  Hemoglobin: 11.7 g/dL (01-26 @ 08:10)      01-30    147<H>  |  112<H>  |  14  ----------------------------<  98  3.9   |  23  |  1.03    Ca    8.9      30 Jan 2018 06:15  Phos  2.4     01-30  Mg     1.6     01-30      Creatinine Trend: 1.03<--, 0.93<--, 0.94<--, 1.11<--, 1.24<--, 1.08<--    COAGS:           T(C): 36.6 (01-29-18 @ 21:26), Max: 36.6 (01-29-18 @ 21:26)  HR: 90 (01-29-18 @ 21:26) (85 - 90)  BP: 152/668 (01-30-18 @ 06:25) (139/96 - 152/668)  RR: 16 (01-30-18 @ 06:25) (16 - 18)  SpO2: 100% (01-29-18 @ 21:26) (96% - 100%)  Wt(kg): --    I&O's Summary      HEENT:   Normal oral mucosa,   Lymphatic: No obvious lymphadenopathy , no edema  Cardiovascular: Normal S1 S2, No JVD, 1/6 PEDRO murmur, Peripheral pulses palpable 2+ bilaterally  Respiratory: Coarse mechanical BS, normal effort 	  Gastrointestinal:  Soft, Non-tender, + BS	  Skin: No rashes,  No cyanosis, warm to touch  Musculoskeletal: unable to fully assess  Psychiatry:  Confused      ECHO: < from: Transthoracic Echocardiogram (01.17.18 @ 14:22) >  CONCLUSIONS:  1. Normal mitral valve. Mild to moderate mitral  regurgitation.  2. Aortic valve not seen well.  3. Aortic Root: 3.1 cm.  4. Mild left atrial enlargement.  5. Normal left ventricular internal dimensions and wall  thicknesses.  6. Endocardium not well visualized; grossly severely  reduced l left ventricular systolic function.  7. Grade II diastolic dysfunction.  8. Normal right atrium.  9. Normal right ventricular size and function.  10. RA Pressure is 8 mm Hg.  11. RV systolic pressure is 33 mm Hg.  12. Normal tricuspid valve.  13. Normal pulmonic valve.  14. Normal pericardium with no pericardial effusion.    < end of copied text >      ASSESSMENT/PLAN: 	89y Male ? cirrhosis found down by his superintendant,  admitted with respiratory failure new rapid afib, shock, found with severe LV dyfx  of unknown duration.    cont rate control with Dig , BB-  bp tolerated ACE well   swallow study complete/   GI / DVT prophylaxis.  renal follow up , holding  diuresis    keep K>4, mag >2.0  fall precaution / Aspiration precaution   cont Haldol per medicine   Consider ASA and plavix if ok with GI since it appears he is high risk for full anticoagulation.  D/W Dr Logan

## 2018-01-30 NOTE — PROGRESS NOTE ADULT - SUBJECTIVE AND OBJECTIVE BOX
PGY 1 Note discussed with supervising resident and primary attending    Patient is a 84y old  Male who presents with a chief complaint of acute hypoxic respiratory failure and metabolic encephalopathy (23 Jan 2018 15:10)      INTERVAL HPI/OVERNIGHT EVENTS: Patient seen and examined at bedside with no new complaints    MEDICATIONS  (STANDING):  digoxin     Tablet 0.125 milliGRAM(s) Oral daily  heparin  Injectable 5000 Unit(s) SubCutaneous every 8 hours  insulin glargine Injectable (LANTUS) 8 Unit(s) SubCutaneous at bedtime  insulin lispro (HumaLOG) corrective regimen sliding scale   SubCutaneous three times a day with meals  lactobacillus acidophilus 1 Tablet(s) Oral every 8 hours  lisinopril 2.5 milliGRAM(s) Oral daily  metoprolol succinate ER 25 milliGRAM(s) Oral daily  pantoprazole    Tablet 40 milliGRAM(s) Oral before breakfast    MEDICATIONS  (PRN):      __________________________________________________  REVIEW OF SYSTEMS:    CONSTITUTIONAL: No fever,   EYES: No acute visual disturbances  NECK: No pain or stiffness  RESPIRATORY: No cough; No shortness of breath  CARDIOVASCULAR: No chest pain, no palpitations  GASTROINTESTINAL: No pain. No nausea or vomiting; No diarrhea   NEUROLOGICAL: No headache or numbness, no tremors  MUSCULOSKELETAL: No joint pain, no muscle pain  GENITOURINARY: No dysuria, no frequency, no hesitancy  PSYCHIATRY: No depression , no anxiety  ALL OTHER  ROS negative        Vital Signs Last 24 Hrs  T(C): 36.6 (29 Jan 2018 21:26), Max: 36.6 (29 Jan 2018 21:26)  T(F): 97.8 (29 Jan 2018 21:26), Max: 97.8 (29 Jan 2018 21:26)  HR: 90 (29 Jan 2018 21:26) (85 - 90)  BP: 143/69 (29 Jan 2018 21:26) (139/96 - 143/69)  BP(mean): --  RR: 16 (29 Jan 2018 21:26) (16 - 18)  SpO2: 100% (29 Jan 2018 21:26) (96% - 100%)    ________________________________________________  PHYSICAL EXAM:  GENERAL: NAD  HEENT: Normocephalic;  conjunctivae and sclerae clear; moist mucous membranes;   NECK : supple  CHEST/LUNG: Clear to auscultation bilaterally with good air entry   HEART: S1 S2  regular; no murmurs, gallops or rubs  ABDOMEN: Soft, Nontender, Nondistended; Bowel sounds present  EXTREMITIES: No cyanosis; no edema; no calf tenderness  NERVOUS SYSTEM:  Awake and alert; Oriented  to place, person and time ; no new deficits    _________________________________________________  LABS:                        13.0   5.9   )-----------( 130      ( 29 Jan 2018 06:30 )             43.3     01-29    145  |  108  |  14  ----------------------------<  97  4.2   |  27  |  0.93    Ca    8.8      29 Jan 2018 06:30  Phos  2.4     01-28  Mg     1.8     01-28    TPro  5.8<L>  /  Alb  2.3<L>  /  TBili  0.9  /  DBili  x   /  AST  32  /  ALT  34  /  AlkPhos  75  01-28        CAPILLARY BLOOD GLUCOSE      POCT Blood Glucose.: 95 mg/dL (29 Jan 2018 21:07)  POCT Blood Glucose.: 93 mg/dL (29 Jan 2018 16:19)  POCT Blood Glucose.: 79 mg/dL (29 Jan 2018 11:58)  POCT Blood Glucose.: 90 mg/dL (29 Jan 2018 07:33)        RADIOLOGY & ADDITIONAL TESTS:    Imaging Personally Reviewed:  YES    Consultant(s) Notes Reviewed:   YES    Care Discussed with Consultants : YES    Plan of care was discussed with patient and /or primary care giver; all questions and concerns were addressed and care was aligned with patient's wishes. PGY 1 Note discussed with supervising resident and primary attending    Patient is a 84y old  Male who presents with a chief complaint of acute hypoxic respiratory failure and metabolic encephalopathy (23 Jan 2018 15:10)      INTERVAL HPI/OVERNIGHT EVENTS: Patient seen and examined at bedside with no new complaints but still confused    MEDICATIONS  (STANDING):  digoxin     Tablet 0.125 milliGRAM(s) Oral daily  heparin  Injectable 5000 Unit(s) SubCutaneous every 8 hours  insulin glargine Injectable (LANTUS) 8 Unit(s) SubCutaneous at bedtime  insulin lispro (HumaLOG) corrective regimen sliding scale   SubCutaneous three times a day with meals  lactobacillus acidophilus 1 Tablet(s) Oral every 8 hours  lisinopril 2.5 milliGRAM(s) Oral daily  metoprolol succinate ER 25 milliGRAM(s) Oral daily  pantoprazole    Tablet 40 milliGRAM(s) Oral before breakfast    MEDICATIONS  (PRN):      __________________________________________________  REVIEW OF SYSTEMS:    CONSTITUTIONAL: No fever,   EYES: No acute visual disturbances  NECK: No pain or stiffness  RESPIRATORY: No cough; No shortness of breath  CARDIOVASCULAR: No chest pain, no palpitations  GASTROINTESTINAL: No pain. No nausea or vomiting; No diarrhea   NEUROLOGICAL: No headache or numbness, no tremors  MUSCULOSKELETAL: No joint pain, no muscle pain  GENITOURINARY: No dysuria, no frequency, no hesitancy  PSYCHIATRY: No depression , no anxiety  ALL OTHER  ROS negative        Vital Signs Last 24 Hrs  T(C): 36.6 (29 Jan 2018 21:26), Max: 36.6 (29 Jan 2018 21:26)  T(F): 97.8 (29 Jan 2018 21:26), Max: 97.8 (29 Jan 2018 21:26)  HR: 90 (29 Jan 2018 21:26) (85 - 90)  BP: 143/69 (29 Jan 2018 21:26) (139/96 - 143/69)  BP(mean): --  RR: 16 (29 Jan 2018 21:26) (16 - 18)  SpO2: 100% (29 Jan 2018 21:26) (96% - 100%)    ________________________________________________  PHYSICAL EXAM:  GENERAL: NAD  HEENT: Normocephalic;  conjunctivae and sclerae clear; moist mucous membranes;   NECK : supple  CHEST/LUNG: Clear to auscultation bilaterally with good air entry   HEART: S1 S2  regular; no murmurs, gallops or rubs  ABDOMEN: Soft, Nontender, Nondistended; Bowel sounds present  EXTREMITIES: No cyanosis; no edema; no calf tenderness  NERVOUS SYSTEM:  Awake and alert; Oriented  to place, person and time ; no new deficits    _________________________________________________  LABS:                        13.0   5.9   )-----------( 130      ( 29 Jan 2018 06:30 )             43.3     01-29    145  |  108  |  14  ----------------------------<  97  4.2   |  27  |  0.93    Ca    8.8      29 Jan 2018 06:30  Phos  2.4     01-28  Mg     1.8     01-28    TPro  5.8<L>  /  Alb  2.3<L>  /  TBili  0.9  /  DBili  x   /  AST  32  /  ALT  34  /  AlkPhos  75  01-28        CAPILLARY BLOOD GLUCOSE      POCT Blood Glucose.: 95 mg/dL (29 Jan 2018 21:07)  POCT Blood Glucose.: 93 mg/dL (29 Jan 2018 16:19)  POCT Blood Glucose.: 79 mg/dL (29 Jan 2018 11:58)  POCT Blood Glucose.: 90 mg/dL (29 Jan 2018 07:33)        RADIOLOGY & ADDITIONAL TESTS:    Imaging Personally Reviewed:  YES    Consultant(s) Notes Reviewed:   YES    Care Discussed with Consultants : YES    Plan of care was discussed with patient and /or primary care giver; all questions and concerns were addressed and care was aligned with patient's wishes.

## 2018-01-30 NOTE — BEHAVIORAL HEALTH ASSESSMENT NOTE - HPI (INCLUDE ILLNESS QUALITY, SEVERITY, DURATION, TIMING, CONTEXT, MODIFYING FACTORS, ASSOCIATED SIGNS AND SYMPTOMS)
Pt is an 83 yo , domiciled alone, [facility] male with no significant psychiatric history.  He presented to [facility] ER on 1/16/2018 BIB EMS after being found on floor in respiratory distress x 2 days with significant metabolic acidosis from ARF with oliguria from dehydration, lactic acidosis and fevers - admitted to MICU for Acute Hypoxic Respiratory Failure 2/2 to Sepsis and Hypovolemia - responded well to fluid resuscitation, octreotide, and albumin but found to have severe LV dysfunction (EF 25-30% - downgraded for continued medical care and is currently Pending VENUS placement, no active issues. Psych consult requested to determine if pt has capacity to participate in d/c planning. Upon evaluation, pt is sleeping, but he is easily arousable. Pt is oriented to self only.  He is unaware of where he is and does not know why he is here.  He is friendly and engaging - he states that he was born in [place], but has been in the [place] since he was a young boy. 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  Pt states that he is  and reported "my wife is in heaven."  He has no children.  Pt noted that he was thirsty, however could not easily manage the beverage and spilled the contents on his hospital gown.  Overall he was not a reliable historian as he could not provide any information related to his current housing situation, family members, medical history etc.   He denies s/s of depression/quentin/psychosis/anxiety. Denies s/h i/i/p.

## 2018-01-30 NOTE — PROGRESS NOTE ADULT - PROBLEM SELECTOR PLAN 4
Presumed new onset  - C/w Digoxin + Lopressor 25 mg BID PO for rate and rhythm control  - No AC 2/2 concern for high risk of bleeding; had episode of BRBPR but H&H stable during admission  Cardiology Dr. Logan

## 2018-01-30 NOTE — BEHAVIORAL HEALTH ASSESSMENT NOTE - SUMMARY
Pt is an 83 yo , domiciled alone, [facility] male with no significant psychiatric history.  He presented to [facility] ER on 1/16/2018 BIB EMS after being found on floor in respiratory distress x 2 days with significant metabolic acidosis from ARF with oliguria from dehydration, lactic acidosis and fevers - admitted to MICU for Acute Hypoxic Respiratory Failure 2/2 to Sepsis and Hypovolemia - responded well to fluid resuscitation, octreotide, and albumin but found to have severe LV dysfunction (EF 25-30% - downgraded for continued medical care and is currently Pending VENUS placement, no active issues. Psych consult requested to determine if pt has capacity to participate in d/c planning. Upon evaluation, pt is sleeping, but he is easily arousable. Pt is oriented to self only.  He is unaware of where he is and does not know why he is here.  He is friendly and engaging - he states that he was born in [place], but has been in the [place] since he was a young boy. 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  Pt states that he is  and reported "my wife is in heaven."  He has no children.  Pt noted that he was thirsty, however could not easily manage the beverage and spilled the contents on his hospital gown.  Overall he was not a reliable historian as he could not provide any information related to his current housing situation, family members, medical history etc.   He denies s/s of depression/quentin/psychosis/anxiety. Denies s/h i/i/p. Pt presents with s/s of delirium superimposed on dementia. He does not have capacity to participate in discharge planning. Would involve family members.

## 2018-01-30 NOTE — PROGRESS NOTE ADULT - ASSESSMENT
84M w/EtOH cirrhosis and anemia needs AC for new-onset AFib.  -given pressing need for AC will schedule pt for EGD and colonoscopy to further evaluate his bleeding risk  -pt also needs a colonoscopy for CRC screening and EGD for variceal surveillance  -plan for EGD/colonoscopy on 2/1 at 12PM  -clear liquid diet on Wednesday  -prep with Golytely Wed at 6 pm; insert NGT if he is unable to drink on his own  -NPO after midnight Wed night

## 2018-01-30 NOTE — PROGRESS NOTE ADULT - ASSESSMENT
84 M PMH DM, HTN, Cirrhosis is BIB EMS after being found on floor in respiratory distress x 2 days with significant metabolic acidosis from ARF with oliguria from dehydration, lactic acidosis and fevers - admitted to MICU for Acute Hypoxic Respiratory Failure 2/2 to Sepsis and Hypovolemia - responded well to fluid resuscitation, octreotide, and albumin but found to have severe LV dysfunction (EF 25-30% - downgraded for continued medical care  - Pending VENUS placement, no active issues

## 2018-01-30 NOTE — PROGRESS NOTE ADULT - PROBLEM SELECTOR PLAN 2
US abdomen supports cirrhosis w/ MELD score 15  - Patient to f/u w/ [facility] as outpatient in 2 weeks  ***Resolved; 378/113 at admission, now normal.

## 2018-01-30 NOTE — PROGRESS NOTE ADULT - SUBJECTIVE AND OBJECTIVE BOX
Pt responds to verbal stimuli, but is unable to answer questions.    T(C): 36.6 (01-29-18 @ 21:26)  T(F): 97.8 (01-29-18 @ 21:26), Max: 97.8 (01-29-18 @ 21:26)  HR: 90 (01-29-18 @ 21:26) (90 - 90)  BP: 152/68 (01-30-18 @ 06:25) (140/84 - 152/668)    Gen: NAD; A&O x3  CVS: S1/S2  Chest: CTABL  Abd: S/NT/ND                        12.2   6.0   )-----------( 167      ( 30 Jan 2018 06:15 )             39.4   01-30    147<H>  |  112<H>  |  14  ----------------------------<  98  3.9   |  23  |  1.03    Ca    8.9      30 Jan 2018 06:15  Phos  2.4     01-30  Mg     1.6     01-30

## 2018-01-30 NOTE — PROGRESS NOTE ADULT - ATTENDING COMMENTS
Patient was seen and examined by myself. Case was discussed with house staff in details. I have reviewed and agree with the plan as outlined above with edits where appropriate.  83 y/o M with   1. bleeding per rectum- no new bleeding episodes. Plan for EGD and colonoscopy on Thursday by GI Dr Murphy as patient will benefit from anticoagulation due to AFIB with high Chads vasc.    2. AFIB - rate controlled; no anticoagulation for now due to recent bleeding; continue current meds.  3. septic shock s/p pressors- sepsis resolved  4. liver cirrhosis with coagulopathy and abnormal LFTs- monitor.   5. Acute respiratory failure s/p intubation- stable post extubation but with residual dysphagia.  6. Dysphagia- dysphagia diet as tolerated; maintain aspiration precautions  7. Acute renal failure - renal functions stabilizing; monitor and avoid nephrotoxins  8. Diabetes-  noted with hypoglycemia- discontinue Lantus; continue Insulin sliding. Gentle IVF with dextrose overnight.  9. Hypernatremia- continue with gentle hydration; monitor BMP  10 metabolic encephalopathy- persistent confusion. Will obtain Ct head to rule out brain pathology. Seen by psych for capacity assessment; lacks medical decision making capacity. Discussed with his next of Kin who is is cousin- Andrew. He is agreeable to EGD/colonoscopy and understands plan for anticoagulation if no contraindication based on GI work up.  - continue PPI  - monitor sodium levels  - Dysphagia diet ; maintain aspiration precautions  - overall prognosis is guarded.   - supportive measures .   Plan of care discussed with nursing staff, , Psych attending, family, GI attending and house staff.  Anticipate would be ready for discharge to SNF by Friday.

## 2018-01-30 NOTE — PROGRESS NOTE ADULT - ATTENDING COMMENTS
Patient seen and examined, agree with above assessment and plan as transcribed above.    - Cont toprol, Lisinopril  - PAF- > ASA/Plavix if his bleeding risk is deemed acceptable    Donald Logan MD, [facility], [facility]  [address] [place] [address]  PHONE:  [phone]  BEEPER : [phone]

## 2018-01-30 NOTE — PROGRESS NOTE ADULT - PROBLEM SELECTOR PLAN 1
Resolved w/ D5W  - Nephrology Dr Moya  ***Right renal complex cyst noted, recommend repeat renal US in 3 months

## 2018-01-30 NOTE — PROGRESS NOTE ADULT - PROBLEM SELECTOR PLAN 6
IMPROVE VTE Score: 3, DVT PPx w/ Lovenox 40 mg  [x] Immobilization > 24 hrs  [x] ICU/CCU stay > 24 hours  [x] Age > 60

## 2018-01-31 NOTE — PROGRESS NOTE ADULT - PROBLEM SELECTOR PLAN 5
BMP wnls; resolved w/ trial of Lactulose; AAOx2 person and place  - Ghanaian speaking, independently living at home prior to admission

## 2018-01-31 NOTE — PROGRESS NOTE ADULT - PROBLEM SELECTOR PLAN 1
Presumed new onset  - C/w Digoxin + Lopressor 25 mg BID PO for rate and rhythm control  - No AC 2/2 concern for high risk of bleeding; had episode of BRBPR but H&H stable during admission  ***Plan for EGD/Colonscopy 2/1 to determine if AC or not  - CHADSVASC 5, 7.2% and HASBLED 4, 9%; as per GI, if AC patient would need monitoring for a few days to ensure no bleed  Cardiology Dr. Logan Presumed new onset  - C/w Digoxin + Lopressor 25 mg BID PO for rate and rhythm control  - No AC 2/2 concern for high risk of bleeding; had episode of BRBPR but H&H stable during admission  ***Plan for EGD/Colonoscopy 2/1 (consent obtained from Dr. Schuler from cousin) to determine if AC or not  - CHADSVASC 5, 7.2% and HASBLED 4, 9%; as per GI, if AC patient would need monitoring for a few days to ensure no bleed  Cardiology Dr. Logan

## 2018-01-31 NOTE — PROGRESS NOTE ADULT - SUBJECTIVE AND OBJECTIVE BOX
Patient resting comfortable,  Confused    dextrose 5%. 1000 milliLiter(s) IV Continuous <Continuous>  digoxin     Tablet 0.125 milliGRAM(s) Oral daily  heparin  Injectable 5000 Unit(s) SubCutaneous every 8 hours  insulin lispro (HumaLOG) corrective regimen sliding scale   SubCutaneous three times a day with meals  lactobacillus acidophilus 1 Tablet(s) Oral every 8 hours  lisinopril 2.5 milliGRAM(s) Oral daily  metoprolol succinate ER 25 milliGRAM(s) Oral daily  pantoprazole    Tablet 40 milliGRAM(s) Oral before breakfast                            11.4   4.6   )-----------( 129      ( 31 Jan 2018 05:49 )             35.9       Hemoglobin: 11.4 g/dL (01-31 @ 05:49)  Hemoglobin: 12.2 g/dL (01-30 @ 06:15)  Hemoglobin: 13.0 g/dL (01-29 @ 06:30)  Hemoglobin: 12.2 g/dL (01-28 @ 07:53)  Hemoglobin: 11.3 g/dL (01-27 @ 08:59)      01-31    148<H>  |  110<H>  |  13  ----------------------------<  96  3.1<L>   |  26  |  0.68    Ca    8.6      31 Jan 2018 05:49  Phos  2.6     01-31  Mg     1.6     01-31      Creatinine Trend: 0.68<--, 1.03<--, 0.93<--, 0.94<--, 1.11<--, 1.24<--    COAGS:           T(C): 30 (01-31-18 @ 11:38), Max: 36.7 (01-30-18 @ 21:45)  HR: 92 (01-31-18 @ 05:28) (86 - 92)  BP: 148/83 (01-31-18 @ 11:38) (131/79 - 148/83)  RR: 18 (01-31-18 @ 05:28) (16 - 18)  SpO2: 98% (01-31-18 @ 11:38) (95% - 100%)  Wt(kg): --    I&O's Summary      HEENT:   Normal oral mucosa,   Lymphatic: No obvious lymphadenopathy , no edema  Cardiovascular: Normal S1 S2, No JVD, 1/6 PEDRO murmur, Peripheral pulses palpable 2+ bilaterally  Respiratory: Coarse mechanical BS, normal effort 	  Gastrointestinal:  Soft, Non-tender, + BS	  Skin: No rashes,  No cyanosis, warm to touch  Musculoskeletal: unable to fully assess  Psychiatry:  Confused      ECHO: < from: Transthoracic Echocardiogram (01.17.18 @ 14:22) >  CONCLUSIONS:  1. Normal mitral valve. Mild to moderate mitral  regurgitation.  2. Aortic valve not seen well.  3. Aortic Root: 3.1 cm.  4. Mild left atrial enlargement.  5. Normal left ventricular internal dimensions and wall  thicknesses.  6. Endocardium not well visualized; grossly severely  reduced l left ventricular systolic function.  7. Grade II diastolic dysfunction.  8. Normal right atrium.  9. Normal right ventricular size and function.  10. RA Pressure is 8 mm Hg.  11. RV systolic pressure is 33 mm Hg.  12. Normal tricuspid valve.  13. Normal pulmonic valve.  14. Normal pericardium with no pericardial effusion.    < end of copied text >      ASSESSMENT/PLAN: 	89y Male ? cirrhosis found down by his superintendant,  admitted with respiratory failure new rapid afib, shock, found with severe LV dyfx  of unknown duration.    cont rate control with Dig , BB-  bp tolerated ACE well   swallow study complete/   GI / DVT prophylaxis.  renal follow up , holding  diuresis    keep K>4, mag >2.0  fall precaution / Aspiration precaution   cont Haldol per medicine   Consider ASA and plavix if ok with GI since it appears he is high risk for full anticoagulation.      Donald Logan MD, FACC  [facility], [facility]  [address] [place] [address]  PHONE:  [phone]  BEEPER : [phone]

## 2018-01-31 NOTE — PROGRESS NOTE ADULT - SUBJECTIVE AND OBJECTIVE BOX
PGY 1 Note discussed with supervising resident and primary attending    Patient is a 84y old  Male who presents with a chief complaint of acute hypoxic respiratory failure and metabolic encephalopathy (23 Jan 2018 15:10)      INTERVAL HPI/OVERNIGHT EVENTS: Patient seen and examined at bedside with no new complaints - hypoglycemic, started D5 50 cc 2/2 poor oral intake and held nightly Lantus, gave MagCitrate, plan for EGD/Colonoscopy w/ Dr. Puckett 2/1, Golytely tonight and NPO p midnight    MEDICATIONS  (STANDING):  dextrose 5%. 1000 milliLiter(s) (50 mL/Hr) IV Continuous <Continuous>  digoxin     Tablet 0.125 milliGRAM(s) Oral daily  heparin  Injectable 5000 Unit(s) SubCutaneous every 8 hours  insulin lispro (HumaLOG) corrective regimen sliding scale   SubCutaneous three times a day with meals  lactobacillus acidophilus 1 Tablet(s) Oral every 8 hours  lisinopril 2.5 milliGRAM(s) Oral daily  metoprolol succinate ER 25 milliGRAM(s) Oral daily  pantoprazole    Tablet 40 milliGRAM(s) Oral before breakfast    MEDICATIONS  (PRN):      __________________________________________________  REVIEW OF SYSTEMS:    CONSTITUTIONAL: No fever,   EYES: No acute visual disturbances  NECK: No pain or stiffness  RESPIRATORY: No cough; No shortness of breath  CARDIOVASCULAR: No chest pain, no palpitations  GASTROINTESTINAL: No pain. No nausea or vomiting; No diarrhea   NEUROLOGICAL: No headache or numbness, no tremors  MUSCULOSKELETAL: No joint pain, no muscle pain  GENITOURINARY: No dysuria, no frequency, no hesitancy  PSYCHIATRY: No depression , no anxiety  ALL OTHER  ROS negative        Vital Signs Last 24 Hrs  T(C): 36.7 (31 Jan 2018 05:28), Max: 36.9 (30 Jan 2018 14:51)  T(F): 98.1 (31 Jan 2018 05:28), Max: 98.4 (30 Jan 2018 14:51)  HR: 92 (31 Jan 2018 05:28) (86 - 92)  BP: 141/73 (31 Jan 2018 05:28) (131/79 - 154/72)  BP(mean): --  RR: 18 (31 Jan 2018 05:28) (16 - 18)  SpO2: 100% (31 Jan 2018 05:28) (95% - 100%)    ________________________________________________  PHYSICAL EXAM:  GENERAL: NAD  HEENT: Normocephalic;  conjunctivae and sclerae clear; moist mucous membranes;   NECK : supple  CHEST/LUNG: Clear to auscultation bilaterally with good air entry   HEART: S1 S2  regular; no murmurs, gallops or rubs  ABDOMEN: Soft, Nontender, Nondistended; Bowel sounds present  EXTREMITIES: No cyanosis; no edema; no calf tenderness  NERVOUS SYSTEM:  Awake, makes eye contact and responds to questions but doesn't make sense    _________________________________________________  LABS:                        11.4   4.6   )-----------( 129      ( 31 Jan 2018 05:49 )             35.9     01-31    x   |  x   |  x   ----------------------------<  x   x    |  x   |  0.68    Ca    8.9      30 Jan 2018 06:15  Phos  2.6     01-31  Mg     1.6     01-31          CAPILLARY BLOOD GLUCOSE      POCT Blood Glucose.: 103 mg/dL (30 Jan 2018 21:55)  POCT Blood Glucose.: 71 mg/dL (30 Jan 2018 17:48)  POCT Blood Glucose.: 67 mg/dL (30 Jan 2018 17:26)  POCT Blood Glucose.: 63 mg/dL (30 Jan 2018 16:58)  POCT Blood Glucose.: 85 mg/dL (30 Jan 2018 11:45)  POCT Blood Glucose.: 85 mg/dL (30 Jan 2018 07:54)        RADIOLOGY & ADDITIONAL TESTS:    Imaging Personally Reviewed:  YES    Consultant(s) Notes Reviewed:   YES    Care Discussed with Consultants : YES    Plan of care was discussed with patient and /or primary care giver; all questions and concerns were addressed and care was aligned with patient's wishes.

## 2018-01-31 NOTE — PROGRESS NOTE ADULT - PROBLEM SELECTOR PLAN 6
POC Glc wnls, low 70-100s  - Held Lantus for poor PO intake, c/w ISS  - Hb A1C 7.5% POC Glc wnls, low 70-100s  - Holding Lantus for poor PO intake, c/w ISS  - Hb A1C 7.5%

## 2018-01-31 NOTE — PROGRESS NOTE ADULT - PROBLEM SELECTOR PLAN 2
Psychiatry consulted to evaluate mental status for patient who reportedly was living independent and managing ADL  - No acute medical illness, w/u currently unremarkable

## 2018-01-31 NOTE — PROGRESS NOTE ADULT - ATTENDING COMMENTS
Patient was seen and examined by myself. Case was discussed with house staff in details. I have reviewed and agree with the plan as outlined above with edits where appropriate.  85 y/o M with   1. bleeding per rectum- no new episodes of bleeding. Plan for EGD and colonoscopy on Thursday by GI Dr Murphy as patient will benefit from anticoagulation due to AFIB with risk of stroke  2. AFIB - rate controlled; no anticoagulation for now due to recent bleeding; continue current meds.  3. liver cirrhosis with coagulopathy and abnormal LFTs- continue to monitor.   4. Dysphagia- currently on dysphagia diet as tolerated; maintain aspiration precautions.  5. Acute renal failure - renal functions stabilizing; monitor and avoid nephrotoxins  6. Diabetes-  noted with hypoglycemia- discontinue Lantus; continue Insulin sliding. Gentle IVF with dextrose overnight.  7. Hypernatremia- sodium level now within normal range; monitor BMP  8. Metabolic encephalopathy- alert but poor orientation. Seen by psych - lacks medical decision making capacity. Discussed with his next of Kin who     is his cousin- Andrew. He is agreeable to EGD/colonoscopy and understands plan for anticoagulation if no contraindication based on GI work up.  - continue PPI  - monitor sodium levels  - on Dysphagia diet ; maintain aspiration precautions  - overall prognosis is guarded.   - supportive measures .

## 2018-01-31 NOTE — PROGRESS NOTE ADULT - ASSESSMENT
84 M PMH DM, HTN, Cirrhosis is BIB EMS after being found on floor in respiratory distress x 2 days with significant metabolic acidosis from ARF with oliguria from dehydration, lactic acidosis and fevers - admitted to MICU for Acute Hypoxic Respiratory Failure 2/2 to Sepsis and Hypovolemia - responded well to fluid resuscitation, octreotide, and albumin but found to have severe LV dysfunction (EF 25-30%) - downgraded for continued medical care  - Pending VENUS placement, no active issues

## 2018-02-01 NOTE — PROGRESS NOTE ADULT - SUBJECTIVE AND OBJECTIVE BOX
PGY 1 Note discussed with supervising resident and primary attending    Patient is a 84y old  Male who presents with a chief complaint of acute hypoxic respiratory failure and metabolic encephalopathy (23 Jan 2018 15:10)      INTERVAL HPI/OVERNIGHT EVENTS: Patient seen and examined at bedside with no new complaints    MEDICATIONS  (STANDING):  dextrose 5%. 1000 milliLiter(s) (50 mL/Hr) IV Continuous <Continuous>  digoxin     Tablet 0.125 milliGRAM(s) Oral daily  heparin  Injectable 5000 Unit(s) SubCutaneous every 8 hours  insulin lispro (HumaLOG) corrective regimen sliding scale   SubCutaneous three times a day with meals  lactobacillus acidophilus 1 Tablet(s) Oral every 8 hours  lisinopril 2.5 milliGRAM(s) Oral daily  metoprolol succinate ER 25 milliGRAM(s) Oral daily  pantoprazole    Tablet 40 milliGRAM(s) Oral before breakfast    MEDICATIONS  (PRN):      __________________________________________________  REVIEW OF SYSTEMS:    CONSTITUTIONAL: No fever,   EYES: No acute visual disturbances  NECK: No pain or stiffness  RESPIRATORY: No cough; No shortness of breath  CARDIOVASCULAR: No chest pain, no palpitations  GASTROINTESTINAL: No pain. No nausea or vomiting; No diarrhea   NEUROLOGICAL: No headache or numbness, no tremors  MUSCULOSKELETAL: No joint pain, no muscle pain  GENITOURINARY: No dysuria, no frequency, no hesitancy  PSYCHIATRY: No depression , no anxiety  ALL OTHER  ROS negative        Vital Signs Last 24 Hrs  T(C): 36.3 (01 Feb 2018 05:20), Max: 36.9 (31 Jan 2018 21:45)  T(F): 97.4 (01 Feb 2018 05:20), Max: 98.5 (31 Jan 2018 21:45)  HR: 102 (01 Feb 2018 05:20) (94 - 102)  BP: 145/43 (01 Feb 2018 05:20) (114/89 - 148/83)  BP(mean): --  RR: 18 (01 Feb 2018 05:20) (17 - 18)  SpO2: 95% (01 Feb 2018 05:20) (95% - 99%)    ________________________________________________  PHYSICAL EXAM:  GENERAL: NAD  HEENT: Normocephalic;  conjunctivae and sclerae clear; moist mucous membranes;   NECK : supple  CHEST/LUNG: Clear to auscultation bilaterally with good air entry   HEART: S1 S2  regular; no murmurs, gallops or rubs  ABDOMEN: Soft, Nontender, Nondistended; Bowel sounds present  EXTREMITIES: No cyanosis; no edema; no calf tenderness  NERVOUS SYSTEM:  Awake and alert; Oriented  to place, person and time ; no new deficits    _________________________________________________  LABS:                        11.4   4.6   )-----------( 129      ( 31 Jan 2018 05:49 )             35.9     01-31    148<H>  |  110<H>  |  13  ----------------------------<  96  3.1<L>   |  26  |  0.68    Ca    8.6      31 Jan 2018 05:49  Phos  2.6     01-31  Mg     1.6     01-31      PT/INR - ( 01 Feb 2018 06:12 )   PT: 13.4 sec;   INR: 1.22 ratio         PTT - ( 01 Feb 2018 06:12 )  PTT:27.8 sec    CAPILLARY BLOOD GLUCOSE      POCT Blood Glucose.: 93 mg/dL (31 Jan 2018 21:15)  POCT Blood Glucose.: 166 mg/dL (31 Jan 2018 16:30)  POCT Blood Glucose.: 90 mg/dL (31 Jan 2018 11:36)  POCT Blood Glucose.: 76 mg/dL (31 Jan 2018 08:14)        RADIOLOGY & ADDITIONAL TESTS:    Imaging Personally Reviewed:  YES    Consultant(s) Notes Reviewed:   YES    Care Discussed with Consultants : YES    Plan of care was discussed with patient and /or primary care giver; all questions and concerns were addressed and care was aligned with patient's wishes. PGY 1 Note discussed with supervising resident and primary attending    Patient is a 84y old  Male who presents with a chief complaint of acute hypoxic respiratory failure and metabolic encephalopathy (23 Jan 2018 15:10)      INTERVAL HPI/OVERNIGHT EVENTS: Patient seen and examined at bedside with no new complaints    MEDICATIONS  (STANDING):  dextrose 5%. 1000 milliLiter(s) (50 mL/Hr) IV Continuous <Continuous>  digoxin     Tablet 0.125 milliGRAM(s) Oral daily  heparin  Injectable 5000 Unit(s) SubCutaneous every 8 hours  insulin lispro (HumaLOG) corrective regimen sliding scale   SubCutaneous three times a day with meals  lactobacillus acidophilus 1 Tablet(s) Oral every 8 hours  lisinopril 2.5 milliGRAM(s) Oral daily  metoprolol succinate ER 25 milliGRAM(s) Oral daily  pantoprazole    Tablet 40 milliGRAM(s) Oral before breakfast    MEDICATIONS  (PRN):      __________________________________________________  REVIEW OF SYSTEMS:    CONSTITUTIONAL: No fever,   EYES: No acute visual disturbances  NECK: No pain or stiffness  RESPIRATORY: No cough; No shortness of breath  CARDIOVASCULAR: No chest pain, no palpitations  GASTROINTESTINAL: No pain. No nausea or vomiting; No diarrhea   NEUROLOGICAL: No headache or numbness, no tremors  MUSCULOSKELETAL: No joint pain, no muscle pain  GENITOURINARY: No dysuria, no frequency, no hesitancy  PSYCHIATRY: No depression , no anxiety  ALL OTHER  ROS negative        Vital Signs Last 24 Hrs  T(C): 36.3 (01 Feb 2018 05:20), Max: 36.9 (31 Jan 2018 21:45)  T(F): 97.4 (01 Feb 2018 05:20), Max: 98.5 (31 Jan 2018 21:45)  HR: 102 (01 Feb 2018 05:20) (94 - 102)  BP: 145/43 (01 Feb 2018 05:20) (114/89 - 148/83)  BP(mean): --  RR: 18 (01 Feb 2018 05:20) (17 - 18)  SpO2: 95% (01 Feb 2018 05:20) (95% - 99%)    ________________________________________________  PHYSICAL EXAM:  GENERAL: NAD  HEENT: Normocephalic;  conjunctivae and sclerae clear; moist mucous membranes;   NECK : supple  CHEST/LUNG: Clear to auscultation bilaterally with good air entry   HEART: S1 S2  regular; no murmurs, gallops or rubs  ABDOMEN: Soft, Nontender, Nondistended; Bowel sounds present  EXTREMITIES: No cyanosis; no edema; no calf tenderness  NERVOUS SYSTEM:  Awake and alert to name only; otherwise answers nonsensical    _________________________________________________  LABS:                        11.4   4.6   )-----------( 129      ( 31 Jan 2018 05:49 )             35.9     01-31    148<H>  |  110<H>  |  13  ----------------------------<  96  3.1<L>   |  26  |  0.68    Ca    8.6      31 Jan 2018 05:49  Phos  2.6     01-31  Mg     1.6     01-31      PT/INR - ( 01 Feb 2018 06:12 )   PT: 13.4 sec;   INR: 1.22 ratio         PTT - ( 01 Feb 2018 06:12 )  PTT:27.8 sec    CAPILLARY BLOOD GLUCOSE      POCT Blood Glucose.: 93 mg/dL (31 Jan 2018 21:15)  POCT Blood Glucose.: 166 mg/dL (31 Jan 2018 16:30)  POCT Blood Glucose.: 90 mg/dL (31 Jan 2018 11:36)  POCT Blood Glucose.: 76 mg/dL (31 Jan 2018 08:14)        RADIOLOGY & ADDITIONAL TESTS:    Imaging Personally Reviewed:  YES    Consultant(s) Notes Reviewed:   YES    Care Discussed with Consultants : YES    Plan of care was discussed with patient and /or primary care giver; all questions and concerns were addressed and care was aligned with patient's wishes.

## 2018-02-01 NOTE — PROGRESS NOTE ADULT - ASSESSMENT
84 M PMH DM, HTN, Cirrhosis is BIB EMS after being found on floor in respiratory distress x 2 days with significant metabolic acidosis from ARF with oliguria from dehydration, lactic acidosis and fevers - admitted to MICU for Acute Hypoxic Respiratory Failure 2/2 to Sepsis and Hypovolemia - responded well to fluid resuscitation, octreotide, and albumin but found to have severe LV dysfunction (EF 25-30%) - downgraded for continued medical care

## 2018-02-01 NOTE — PROGRESS NOTE ADULT - SUBJECTIVE AND OBJECTIVE BOX
Patient resting comfortable,  Confused    dextrose 5%. 1000 milliLiter(s) IV Continuous <Continuous>  digoxin     Tablet 0.125 milliGRAM(s) Oral daily  heparin  Injectable 5000 Unit(s) SubCutaneous every 8 hours  insulin lispro (HumaLOG) corrective regimen sliding scale   SubCutaneous three times a day with meals  lactobacillus acidophilus 1 Tablet(s) Oral every 8 hours  lisinopril 2.5 milliGRAM(s) Oral daily  metoprolol succinate ER 25 milliGRAM(s) Oral daily  nystatin Powder 1 Application(s) Topical two times a day  pantoprazole    Tablet 40 milliGRAM(s) Oral before breakfast                            12.5   4.6   )-----------( 147      ( 01 Feb 2018 06:12 )             41.5       Hemoglobin: 12.5 g/dL (02-01 @ 06:12)  Hemoglobin: 11.4 g/dL (01-31 @ 05:49)  Hemoglobin: 12.2 g/dL (01-30 @ 06:15)  Hemoglobin: 13.0 g/dL (01-29 @ 06:30)  Hemoglobin: 12.2 g/dL (01-28 @ 07:53)      02-01    150<H>  |  113<H>  |  18  ----------------------------<  127<H>  3.7   |  28  |  0.89    Ca    8.9      01 Feb 2018 06:12  Phos  2.3     02-01  Mg     1.7     02-01      Creatinine Trend: 0.89<--, 0.68<--, 1.03<--, 0.93<--, 0.94<--, 1.11<--    COAGS: PT/INR - ( 01 Feb 2018 06:12 )   PT: 13.4 sec;   INR: 1.22 ratio         PTT - ( 01 Feb 2018 06:12 )  PTT:27.8 sec          T(C): 36.5 (02-01-18 @ 14:06), Max: 36.9 (01-31-18 @ 21:45)  HR: 65 (02-01-18 @ 14:06) (65 - 102)  BP: 103/60 (02-01-18 @ 14:06) (103/60 - 145/43)  RR: 18 (02-01-18 @ 14:06) (17 - 18)  SpO2: 98% (02-01-18 @ 14:06) (95% - 99%)  Wt(kg): --    I&O's Summary        HEENT:   Normal oral mucosa,   Lymphatic: No obvious lymphadenopathy , no edema  Cardiovascular: Normal S1 S2, No JVD, 1/6 PEDRO murmur, Peripheral pulses palpable 2+ bilaterally  Respiratory: Coarse mechanical BS, normal effort 	  Gastrointestinal:  Soft, Non-tender, + BS	  Skin: No rashes,  No cyanosis, warm to touch  Musculoskeletal: unable to fully assess  Psychiatry:  Confused      ECHO: < from: Transthoracic Echocardiogram (01.17.18 @ 14:22) >  CONCLUSIONS:  1. Normal mitral valve. Mild to moderate mitral  regurgitation.  2. Aortic valve not seen well.  3. Aortic Root: 3.1 cm.  4. Mild left atrial enlargement.  5. Normal left ventricular internal dimensions and wall  thicknesses.  6. Endocardium not well visualized; grossly severely  reduced l left ventricular systolic function.  7. Grade II diastolic dysfunction.  8. Normal right atrium.  9. Normal right ventricular size and function.  10. RA Pressure is 8 mm Hg.  11. RV systolic pressure is 33 mm Hg.  12. Normal tricuspid valve.  13. Normal pulmonic valve.  14. Normal pericardium with no pericardial effusion.    < end of copied text >      ASSESSMENT/PLAN: 	89y Male ? cirrhosis found down by his superintendant,  admitted with respiratory failure new rapid afib, shock, found with severe LV dyfx  of unknown duration.    cont rate control with Dig , BB-  bp tolerated ACE well   swallow study complete/   GI / DVT prophylaxis.  renal follow up , holding  diuresis    keep K>4, mag >2.0  fall precaution / Aspiration precaution   cont Haldol per medicine   Consider ASA and plavix if deemed high risk for AC, GI w/u pending  - Not in clinical CHF      Donald Logan MD, FACC  [facility], [facility]  [address] [place] [address]  PHONE:  [phone]  BEEPER : [phone]

## 2018-02-01 NOTE — PROGRESS NOTE ADULT - ATTENDING COMMENTS
Patient was seen and examined by myself. Case was discussed with house staff in details. I have reviewed and agree with the plan as outlined above with edits where appropriate.  S/p colonoscopy and EGD- found to have gastric ulcer; also with colonic pathology possibly consistent with ischemic colitis.  Plan for CTA abd/pelvis to further assess.

## 2018-02-01 NOTE — PROGRESS NOTE ADULT - PROBLEM SELECTOR PLAN 2
Psychiatry consulted to evaluate mental status for patient who reportedly was living independent and managing ADL  - No acute medical illness, w/u currently unremarkable Psychiatry consulted to evaluate mental status for patient who reportedly was living independent and managing ADL  - No acute medical illness, w/u currently unremarkable  ***CT head negative  Tremors noted on examination; no medications patient is taking is likely the cause, none newly started.

## 2018-02-01 NOTE — PROGRESS NOTE ADULT - PROBLEM SELECTOR PLAN 5
BMP wnls; resolved w/ trial of Lactulose; AAOx2 person and place  - [place] speaking, independently living at home prior to admission

## 2018-02-01 NOTE — PROGRESS NOTE ADULT - PROBLEM SELECTOR PLAN 1
Presumed new onset  - C/w Digoxin + Lopressor 25 mg BID PO for rate and rhythm control  - No AC 2/2 concern for high risk of bleeding; had episode of BRBPR but H&H stable during admission  ***Plan for EGD/Colonoscopy 2/1 (consent obtained from Dr. Schuler from cousin) to determine if AC or not  - CHADSVASC 5, 7.2% and HASBLED 4, 9%; as per GI, if AC patient would need monitoring for a few days to ensure no bleed  Cardiology Dr. Logan Presumed new onset  - C/w Digoxin + Lopressor 25 mg BID PO for rate and rhythm control  - No AC 2/2 concern for high risk of bleeding; had episode of BRBPR but H&H stable during admission  ***EGD/Colonoscopy 2/1 - one gastric ulcer that healed and multiple colonic ulcers; concern for ischemic colitis and possible IBD that'd require steroids; Bx results pending  - CHADSVASC 5, 7.2% and HASBLED 4, 9%  Cardiology Dr. Logan  ***Pending CTA abdomen/pelvis

## 2018-02-01 NOTE — PROGRESS NOTE ADULT - PROBLEM SELECTOR PLAN 4
US abdomen supports cirrhosis w/ MELD score 15  - Patient to f/u w/ [facility] as outpatient in 2 weeks  ***Resolved; 378/113 at admission, now normal.

## 2018-02-02 NOTE — PROGRESS NOTE ADULT - PROBLEM SELECTOR PLAN 2
Psychiatry consulted to evaluate mental status for patient who reportedly was living independent and managing ADL  - No acute medical illness, w/u currently unremarkable Psychiatry consulted to evaluate mental status for patient who reportedly was living independent and managing ADL  - No acute medical illness, w/u currently unremarkable  ***CT head negative  Tremors noted on examination; no medications patient is taking is likely the cause, none newly started. Possible related to patient's position near the cold window

## 2018-02-02 NOTE — PROGRESS NOTE ADULT - PROBLEM SELECTOR PLAN 1
Presumed new onset  - C/w Digoxin + Lopressor 25 mg BID PO for rate and rhythm control  - No AC 2/2 concern for high risk of bleeding; had episode of BRBPR but H&H stable during admission  ***Plan for EGD/Colonoscopy 2/1 (consent obtained from Dr. Schuler from cousin) to determine if AC or not  - CHADSVASC 5, 7.2% and HASBLED 4, 9%; as per GI, if AC patient would need monitoring for a few days to ensure no bleed  Cardiology Dr. Logan Presumed new onset  - C/w Digoxin + Lopressor 25 mg BID PO for rate and rhythm control  - No AC 2/2 concern for high risk of bleeding; had episode of BRBPR but H&H stable during admission  ***EGD/Colonoscopy 2/1 - one gastric ulcer that healed and multiple colonic ulcers; concern for ischemic colitis and possible IBD that'd require steroids; Bx results pending  - CHADSVASC 5, 7.2% and HASBLED 4, 9%  Cardiology Dr. 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

## 2018-02-02 NOTE — PROGRESS NOTE ADULT - ATTENDING COMMENTS
Patient was seen and examined by myself. Case was discussed with house staff in details. I have reviewed and agree with the plan as outlined above with edits where appropriate.  Patient remains stable at this time.  I discussed with GI attending at length- high risk of bleeding with Aspirin or any anticoagulant. Can treat with Plavix cautious ly  CTA reviewed.  continue maintenance meds.  Discharge planning to [facility].

## 2018-02-02 NOTE — PROGRESS NOTE ADULT - SUBJECTIVE AND OBJECTIVE BOX
PGY 1 Note discussed with supervising resident and primary attending    Patient is a 84y old  Male who presents with a chief complaint of acute hypoxic respiratory failure and metabolic encephalopathy (23 Jan 2018 15:10)      INTERVAL HPI/OVERNIGHT EVENTS: Patient seen and examined at bedside with no new complaints    MEDICATIONS  (STANDING):  dextrose 5%. 1000 milliLiter(s) (50 mL/Hr) IV Continuous <Continuous>  digoxin     Tablet 0.125 milliGRAM(s) Oral daily  insulin lispro (HumaLOG) corrective regimen sliding scale   SubCutaneous three times a day with meals  lactobacillus acidophilus 1 Tablet(s) Oral every 8 hours  lisinopril 2.5 milliGRAM(s) Oral daily  metoprolol succinate ER 25 milliGRAM(s) Oral daily  nystatin Powder 1 Application(s) Topical two times a day  pantoprazole    Tablet 40 milliGRAM(s) Oral before breakfast    MEDICATIONS  (PRN):      __________________________________________________  REVIEW OF SYSTEMS:    CONSTITUTIONAL: No fever,   EYES: No acute visual disturbances  NECK: No pain or stiffness  RESPIRATORY: No cough; No shortness of breath  CARDIOVASCULAR: No chest pain, no palpitations  GASTROINTESTINAL: No pain. No nausea or vomiting; No diarrhea   NEUROLOGICAL: No headache or numbness, no tremors  MUSCULOSKELETAL: No joint pain, no muscle pain  GENITOURINARY: No dysuria, no frequency, no hesitancy  PSYCHIATRY: No depression , no anxiety  ALL OTHER  ROS negative        Vital Signs Last 24 Hrs  T(C): 36.7 (02 Feb 2018 05:09), Max: 36.8 (01 Feb 2018 21:17)  T(F): 98 (02 Feb 2018 05:09), Max: 98.2 (01 Feb 2018 21:17)  HR: 101 (02 Feb 2018 05:09) (60 - 101)  BP: 150/94 (02 Feb 2018 05:09) (103/60 - 152/93)  BP(mean): --  RR: 18 (02 Feb 2018 05:09) (18 - 20)  SpO2: 98% (02 Feb 2018 05:09) (98% - 99%)    ________________________________________________  PHYSICAL EXAM:  GENERAL: NAD  HEENT: Normocephalic;  conjunctivae and sclerae clear; moist mucous membranes;   NECK : supple  CHEST/LUNG: Clear to auscultation bilaterally with good air entry   HEART: S1 S2  regular; no murmurs, gallops or rubs  ABDOMEN: Soft, Nontender, Nondistended; Bowel sounds present  EXTREMITIES: No cyanosis; no edema; no calf tenderness  NERVOUS SYSTEM:  Awake and alert; Oriented  to place, person and time ; no new deficits    _________________________________________________  LABS:                        12.5   4.6   )-----------( 147      ( 01 Feb 2018 06:12 )             41.5     02-01    150<H>  |  113<H>  |  18  ----------------------------<  127<H>  3.7   |  28  |  0.89    Ca    8.9      01 Feb 2018 06:12  Phos  2.3     02-01  Mg     1.7     02-01      PT/INR - ( 01 Feb 2018 06:12 )   PT: 13.4 sec;   INR: 1.22 ratio         PTT - ( 01 Feb 2018 06:12 )  PTT:27.8 sec    CAPILLARY BLOOD GLUCOSE      POCT Blood Glucose.: 126 mg/dL (01 Feb 2018 22:09)  POCT Blood Glucose.: 151 mg/dL (01 Feb 2018 16:27)  POCT Blood Glucose.: 112 mg/dL (01 Feb 2018 11:45)  POCT Blood Glucose.: 119 mg/dL (01 Feb 2018 08:10)        RADIOLOGY & ADDITIONAL TESTS:    Imaging Personally Reviewed:  YES    Consultant(s) Notes Reviewed:   YES    Care Discussed with Consultants : YES    Plan of care was discussed with patient and /or primary care giver; all questions and concerns were addressed and care was aligned with patient's wishes. PGY 1 Note discussed with supervising resident and primary attending    Patient is a 84y old  Male who presents with a chief complaint of acute hypoxic respiratory failure and metabolic encephalopathy (23 Jan 2018 15:10)      INTERVAL HPI/OVERNIGHT EVENTS: Patient seen and examined at bedside with no new complaints    MEDICATIONS  (STANDING):  dextrose 5%. 1000 milliLiter(s) (50 mL/Hr) IV Continuous <Continuous>  digoxin     Tablet 0.125 milliGRAM(s) Oral daily  insulin lispro (HumaLOG) corrective regimen sliding scale   SubCutaneous three times a day with meals  lactobacillus acidophilus 1 Tablet(s) Oral every 8 hours  lisinopril 2.5 milliGRAM(s) Oral daily  metoprolol succinate ER 25 milliGRAM(s) Oral daily  nystatin Powder 1 Application(s) Topical two times a day  pantoprazole    Tablet 40 milliGRAM(s) Oral before breakfast    MEDICATIONS  (PRN):      __________________________________________________  REVIEW OF SYSTEMS:    CONSTITUTIONAL: No fever,   EYES: No acute visual disturbances  NECK: No pain or stiffness  RESPIRATORY: No cough; No shortness of breath  CARDIOVASCULAR: No chest pain, no palpitations  GASTROINTESTINAL: No pain. No nausea or vomiting; No diarrhea   NEUROLOGICAL: No headache or numbness, no tremors  MUSCULOSKELETAL: No joint pain, no muscle pain  GENITOURINARY: No dysuria, no frequency, no hesitancy  PSYCHIATRY: No depression , no anxiety  ALL OTHER  ROS negative        Vital Signs Last 24 Hrs  T(C): 36.7 (02 Feb 2018 05:09), Max: 36.8 (01 Feb 2018 21:17)  T(F): 98 (02 Feb 2018 05:09), Max: 98.2 (01 Feb 2018 21:17)  HR: 101 (02 Feb 2018 05:09) (60 - 101)  BP: 150/94 (02 Feb 2018 05:09) (103/60 - 152/93)  BP(mean): --  RR: 18 (02 Feb 2018 05:09) (18 - 20)  SpO2: 98% (02 Feb 2018 05:09) (98% - 99%)    ________________________________________________  PHYSICAL EXAM:  GENERAL: NAD  HEENT: Normocephalic;  conjunctivae and sclerae clear; moist mucous membranes;   NECK : supple  CHEST/LUNG: Clear to auscultation bilaterally with good air entry   HEART: S1 S2  regular; no murmurs, gallops or rubs  ABDOMEN: Soft, Nontender, Nondistended; Bowel sounds present  EXTREMITIES: No cyanosis; no edema; no calf tenderness  NERVOUS SYSTEM:  Awake and alert; no tremors (window was open in AM, very cold in his side of the room), still does not make any sense    _________________________________________________  LABS:                        12.5   4.6   )-----------( 147      ( 01 Feb 2018 06:12 )             41.5     02-01    150<H>  |  113<H>  |  18  ----------------------------<  127<H>  3.7   |  28  |  0.89    Ca    8.9      01 Feb 2018 06:12  Phos  2.3     02-01  Mg     1.7     02-01      PT/INR - ( 01 Feb 2018 06:12 )   PT: 13.4 sec;   INR: 1.22 ratio         PTT - ( 01 Feb 2018 06:12 )  PTT:27.8 sec    CAPILLARY BLOOD GLUCOSE      POCT Blood Glucose.: 126 mg/dL (01 Feb 2018 22:09)  POCT Blood Glucose.: 151 mg/dL (01 Feb 2018 16:27)  POCT Blood Glucose.: 112 mg/dL (01 Feb 2018 11:45)  POCT Blood Glucose.: 119 mg/dL (01 Feb 2018 08:10)        RADIOLOGY & ADDITIONAL TESTS:    Imaging Personally Reviewed:  YES    Consultant(s) Notes Reviewed:   YES    Care Discussed with Consultants : YES    Plan of care was discussed with patient and /or primary care giver; all questions and concerns were addressed and care was aligned with patient's wishes.

## 2018-02-02 NOTE — PROGRESS NOTE ADULT - SUBJECTIVE AND OBJECTIVE BOX
Patient resting comfortable,  Confused    dextrose 5%. 1000 milliLiter(s) IV Continuous <Continuous>  digoxin     Tablet 0.125 milliGRAM(s) Oral daily  insulin lispro (HumaLOG) corrective regimen sliding scale   SubCutaneous three times a day with meals  lactobacillus acidophilus 1 Tablet(s) Oral every 8 hours  lisinopril 2.5 milliGRAM(s) Oral daily  metoprolol succinate ER 25 milliGRAM(s) Oral daily  nystatin Powder 1 Application(s) Topical two times a day  pantoprazole    Tablet 40 milliGRAM(s) Oral before breakfast                            12.5   4.6   )-----------( 147      ( 01 Feb 2018 06:12 )             41.5       Hemoglobin: 12.5 g/dL (02-01 @ 06:12)  Hemoglobin: 11.4 g/dL (01-31 @ 05:49)  Hemoglobin: 12.2 g/dL (01-30 @ 06:15)  Hemoglobin: 13.0 g/dL (01-29 @ 06:30)      02-01    150<H>  |  113<H>  |  18  ----------------------------<  127<H>  3.7   |  28  |  0.89    Ca    8.9      01 Feb 2018 06:12  Phos  2.3     02-01  Mg     1.7     02-01      Creatinine Trend: 0.89<--, 0.68<--, 1.03<--, 0.93<--, 0.94<--, 1.11<--    COAGS:           T(C): 36.5 (02-02-18 @ 14:16), Max: 36.8 (02-01-18 @ 21:17)  HR: 98 (02-02-18 @ 14:16) (60 - 101)  BP: 129/80 (02-02-18 @ 14:16) (129/60 - 150/94)  RR: 18 (02-02-18 @ 14:16) (18 - 20)  SpO2: 98% (02-02-18 @ 14:16) (98% - 99%)  Wt(kg): --    I&O's Summary          HEENT:   Normal oral mucosa,   Lymphatic: No obvious lymphadenopathy , no edema  Cardiovascular: Normal S1 S2, No JVD, 1/6 PEDRO murmur, Peripheral pulses palpable 2+ bilaterally  Respiratory: Coarse mechanical BS, normal effort 	  Gastrointestinal:  Soft, Non-tender, + BS	  Skin: No rashes,  No cyanosis, warm to touch  Musculoskeletal: unable to fully assess  Psychiatry:  Confused      ECHO: < from: Transthoracic Echocardiogram (01.17.18 @ 14:22) >  CONCLUSIONS:  1. Normal mitral valve. Mild to moderate mitral  regurgitation.  2. Aortic valve not seen well.  3. Aortic Root: 3.1 cm.  4. Mild left atrial enlargement.  5. Normal left ventricular internal dimensions and wall  thicknesses.  6. Endocardium not well visualized; grossly severely  reduced l left ventricular systolic function.  7. Grade II diastolic dysfunction.  8. Normal right atrium.  9. Normal right ventricular size and function.  10. RA Pressure is 8 mm Hg.  11. RV systolic pressure is 33 mm Hg.  12. Normal tricuspid valve.  13. Normal pulmonic valve.  14. Normal pericardium with no pericardial effusion.    < end of copied text >      ASSESSMENT/PLAN: 	89y Male ? cirrhosis found down by his superintendant,  admitted with respiratory failure new rapid afib, shock, found with severe LV dyfx  of unknown duration.    cont rate control with Dig , BB-  bp tolerated ACE well   swallow study complete/   GI / DVT prophylaxis.  renal follow up , holding  diuresis    keep K>4, mag >2.0  fall precaution / Aspiration precaution   cont Haldol per medicine   Consider ASA and plavix if deemed high risk for AC, GI w/u in progress ? ischemic colitis   - Not in clinical CHF      Donald Logan MD, FACC  [facility], [facility]  [address] [place] [address]  PHONE:  [phone]  BEEPER : [phone]

## 2018-02-02 NOTE — PROGRESS NOTE ADULT - PROBLEM SELECTOR PLAN 5
BMP wnls; resolved w/ trial of Lactulose; AAOx2 person and place  - [place] speaking, independently living at home prior to admission

## 2018-02-03 NOTE — PROGRESS NOTE ADULT - PROBLEM SELECTOR PLAN 6
stable; monitor POC Glc wnls, low 70-100s  - Holding Lantus for poor PO intake, c/w ISS  - Hb A1C 7.5%

## 2018-02-03 NOTE — PROGRESS NOTE ADULT - PROBLEM SELECTOR PLAN 5
POC Glc wnls, low 70-100s  - Holding Lantus for poor PO intake, c/w ISS  - Hb A1C 7.5% monitor  verbal redirection as needed.  avoid benzodiazepines

## 2018-02-03 NOTE — PROGRESS NOTE ADULT - SUBJECTIVE AND OBJECTIVE BOX
MEDICAL ATTENDING NOTE    Patient is a 84y old  Male who presents with a chief complaint of acute hypoxic respiratory failure and metabolic encephalopathy (23 Jan 2018 15:10)      INTERVAL HPI/OVERNIGHT EVENTS: no new complaints    MEDICATIONS  (STANDING):  dextrose 5%. 1000 milliLiter(s) (75 mL/Hr) IV Continuous <Continuous>  digoxin     Tablet 0.125 milliGRAM(s) Oral daily  insulin lispro (HumaLOG) corrective regimen sliding scale   SubCutaneous three times a day with meals  lactobacillus acidophilus 1 Tablet(s) Oral every 8 hours  lisinopril 2.5 milliGRAM(s) Oral daily  metoprolol succinate ER 25 milliGRAM(s) Oral daily  nystatin Powder 1 Application(s) Topical two times a day  pantoprazole    Tablet 40 milliGRAM(s) Oral before breakfast    MEDICATIONS  (PRN):      __________________________________________________  ----------------------------------------------------------------------------------  REVIEW OF SYSTEMS: no fever, no SOB, No Chest pain; feels well      Vital Signs Last 24 Hrs  T(C): 36.4 (03 Feb 2018 13:17), Max: 36.6 (03 Feb 2018 02:45)  T(F): 97.5 (03 Feb 2018 13:17), Max: 97.9 (03 Feb 2018 02:45)  HR: 84 (03 Feb 2018 13:17) (84 - 93)  BP: 115/97 (03 Feb 2018 13:17) (115/97 - 148/81)  BP(mean): --  RR: 18 (03 Feb 2018 13:17) (17 - 18)  SpO2: 99% (03 Feb 2018 13:17) (96% - 100%)    _________________  PHYSICAL EXAM:  ---------------------------   NAD; Normocephalic;   LUNGS - no wheezing  HEART: S1 S2+   ABDOMEN: Soft, Nontender, non distended  EXTREMITIES: no cyanosis; no edema  NERVOUS SYSTEM:  Awake and alert; no new deficits    _________________________________________________  LABS:                        10.8   4.8   )-----------( 119      ( 03 Feb 2018 07:16 )             35.9     02-03    147<H>  |  111<H>  |  14  ----------------------------<  129<H>  3.0<L>   |  29  |  0.97    Ca    8.5      03 Feb 2018 07:16  Phos  2.7     02-03  Mg     1.6     02-03          CAPILLARY BLOOD GLUCOSE      POCT Blood Glucose.: 94 mg/dL (03 Feb 2018 16:51)  POCT Blood Glucose.: 157 mg/dL (03 Feb 2018 11:19)  POCT Blood Glucose.: 121 mg/dL (03 Feb 2018 07:30)  POCT Blood Glucose.: 124 mg/dL (02 Feb 2018 21:18)            Care Discussed with Consultants :     Plan of care was discussed with patient ; all questions and concerns were addressed and care was aligned with patient's wishes.

## 2018-02-03 NOTE — PROGRESS NOTE ADULT - ATTENDING COMMENTS
CARDIOLOGY ATTENDING    Agree with above. Continue current medical therapy for LV dysfunction and PAF.

## 2018-02-03 NOTE — PROGRESS NOTE ADULT - PROBLEM SELECTOR PLAN 9
IMPROVE VTE score 2  Need for DVT ppx, hold AC due to BRBPR  GI PPX on protonix
IMPROVE VTE Score: 3, DVT PPx w/ Lovenox 40 mg  [x] Immobilization > 24 hrs  [x] ICU/CCU stay > 24 hours  [x] Age > 60

## 2018-02-03 NOTE — PROGRESS NOTE ADULT - PROBLEM SELECTOR PLAN 2
metabolic encephalopathy due to acute medical illness- slowly improving.  Still intermittently confused.  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  NO anticoagulation due to recent bleeding and high risk of recurrent  GI bleeding.  Monitor cbc

## 2018-02-03 NOTE — PROGRESS NOTE ADULT - PROBLEM SELECTOR PLAN 1
Presumed new onset  - stable on Digoxin and Lopressor   - No AC 2/2 concern for high risk of bleeding; had episode of BRBPR which has resolved now.  His hb decreased from about 15 to 10 over the curse of few days.  Will continue to monitor for stability.  Found to have multiple ulcers in the colon during colonoscopy- possible ischemic colitis. Per GI avoid all NSAIDS including Aspirin due to high risk of bleeding.  ***EGD/Colonoscopy 2/1 - one gastric ulcer that healed and multiple colonic ulcers; concern for ischemic colitis and possible IBD that'd require steroids; Bx results pending  - CHADSVASC 5, 7.2% and HASBLED 4, 9%  Cardiology Dr. Logan  CTA abdomen/pelvis; showed proximal SMA stenosis but nothing for acute management

## 2018-02-03 NOTE — PROGRESS NOTE ADULT - PROBLEM SELECTOR PLAN 3
continue with DSW  monitor sodium level No clinical signs of acute decompensation  continue cardiac meds.  monitor

## 2018-02-03 NOTE — PROGRESS NOTE ADULT - PROBLEM SELECTOR PLAN 4
monitor  verbal redirection as needed.  avoid benzodiazepines continue with DSW  monitor sodium level

## 2018-02-03 NOTE — PROGRESS NOTE ADULT - PROBLEM SELECTOR PLAN 7
IMPROVE VTE Score: 3, DVT PPx w/ Lovenox 40 mg  [x] Immobilization > 24 hrs  [x] ICU/CCU stay > 24 hours  [x] Age > 60 stable; monitor

## 2018-02-03 NOTE — PROGRESS NOTE ADULT - SUBJECTIVE AND OBJECTIVE BOX
pt seen and examined, no complaints on exam.  NAD on exam     dextrose 5%. 1000 milliLiter(s) IV Continuous <Continuous>  digoxin     Tablet 0.125 milliGRAM(s) Oral daily  insulin lispro (HumaLOG) corrective regimen sliding scale   SubCutaneous three times a day with meals  lactobacillus acidophilus 1 Tablet(s) Oral every 8 hours  lisinopril 2.5 milliGRAM(s) Oral daily  metoprolol succinate ER 25 milliGRAM(s) Oral daily  nystatin Powder 1 Application(s) Topical two times a day  pantoprazole    Tablet 40 milliGRAM(s) Oral before breakfast                            12.5   4.6   )-----------( 147      ( 01 Feb 2018 06:12 )             41.5       Hemoglobin: 12.5 g/dL (02-01 @ 06:12)  Hemoglobin: 11.4 g/dL (01-31 @ 05:49)  Hemoglobin: 12.2 g/dL (01-30 @ 06:15)  Hemoglobin: 13.0 g/dL (01-29 @ 06:30)      02-01    150<H>  |  113<H>  |  18  ----------------------------<  127<H>  3.7   |  28  |  0.89    Ca    8.9      01 Feb 2018 06:12  Phos  2.3     02-01  Mg     1.7     02-01      Creatinine Trend: 0.89<--, 0.68<--, 1.03<--, 0.93<--, 0.94<--, 1.11<--    COAGS:           T(C): 36.6 (02-03-18 @ 02:45), Max: 36.7 (02-02-18 @ 05:09)  HR: 93 (02-03-18 @ 02:45) (91 - 101)  BP: 133/77 (02-03-18 @ 02:45) (129/80 - 150/94)  RR: 17 (02-03-18 @ 02:45) (17 - 18)  SpO2: 100% (02-03-18 @ 02:45) (96% - 100%)  Wt(kg): --    I&O's Summary    HEENT:   Normal oral mucosa,   Lymphatic: No obvious lymphadenopathy , no edema  Cardiovascular: Normal S1 S2, No JVD, 1/6 PEDRO murmur, Peripheral pulses palpable 2+ bilaterally  Respiratory: Coarse mechanical BS, normal effort 	  Gastrointestinal:  Soft, Non-tender, + BS	  Skin: No rashes,  No cyanosis, warm to touch  Musculoskeletal: unable to fully assess  Psychiatry:  Confused      ECHO: < from: Transthoracic Echocardiogram (01.17.18 @ 14:22) >  CONCLUSIONS:  1. Normal mitral valve. Mild to moderate mitral  regurgitation.  2. Aortic valve not seen well.  3. Aortic Root: 3.1 cm.  4. Mild left atrial enlargement.  5. Normal left ventricular internal dimensions and wall  thicknesses.  6. Endocardium not well visualized; grossly severely  reduced l left ventricular systolic function.  7. Grade II diastolic dysfunction.  8. Normal right atrium.  9. Normal right ventricular size and function.  10. RA Pressure is 8 mm Hg.  11. RV systolic pressure is 33 mm Hg.  12. Normal tricuspid valve.  13. Normal pulmonic valve.  14. Normal pericardium with no pericardial effusion.    < end of copied text >      ASSESSMENT/PLAN: 	89y Male ? cirrhosis found down by his superintendant,  admitted with respiratory failure new rapid afib, shock, found with severe LV dyfx  of unknown duration.    cont rate control with Dig , BB-  bp tolerated ACE well    GI / DVT prophylaxis.  renal follow up , off   diuretics at present .   keep K>4, mag >2.0  fall precaution / Aspiration precaution   cont Haldol per medicine - would check ECG for QTC   Consider ASA and plavix if ok with GI since it appears he is high risk for full anticoagulation.  D/W Dr Tejada

## 2018-02-04 NOTE — PROGRESS NOTE ADULT - SUBJECTIVE AND OBJECTIVE BOX
PGY 1 Note discussed with supervising resident and primary attending    Patient is a 84y old  Male who presents with a chief complaint of acute hypoxic respiratory failure and metabolic encephalopathy (23 Jan 2018 15:10)      INTERVAL HPI/OVERNIGHT EVENTS: Patient seen and examined at bedside with no new complaints    MEDICATIONS  (STANDING):  dextrose 5%. 1000 milliLiter(s) (75 mL/Hr) IV Continuous <Continuous>  digoxin     Tablet 0.125 milliGRAM(s) Oral daily  insulin lispro (HumaLOG) corrective regimen sliding scale   SubCutaneous three times a day with meals  lactobacillus acidophilus 1 Tablet(s) Oral every 8 hours  lisinopril 2.5 milliGRAM(s) Oral daily  metoprolol succinate ER 25 milliGRAM(s) Oral daily  nystatin Powder 1 Application(s) Topical two times a day  pantoprazole    Tablet 40 milliGRAM(s) Oral before breakfast    MEDICATIONS  (PRN):      __________________________________________________  REVIEW OF SYSTEMS:    CONSTITUTIONAL: No fever,   EYES: No acute visual disturbances  NECK: No pain or stiffness  RESPIRATORY: No cough; No shortness of breath  CARDIOVASCULAR: No chest pain, no palpitations  GASTROINTESTINAL: No pain. No nausea or vomiting; No diarrhea   NEUROLOGICAL: No headache or numbness, no tremors  MUSCULOSKELETAL: No joint pain, no muscle pain  GENITOURINARY: No dysuria, no frequency, no hesitancy  PSYCHIATRY: No depression , no anxiety  ALL OTHER  ROS negative        Vital Signs Last 24 Hrs  T(C): 36.8 (04 Feb 2018 05:28), Max: 36.8 (04 Feb 2018 05:28)  T(F): 98.2 (04 Feb 2018 05:28), Max: 98.2 (04 Feb 2018 05:28)  HR: 79 (04 Feb 2018 05:28) (79 - 95)  BP: 130/80 (04 Feb 2018 05:28) (105/69 - 130/80)  BP(mean): --  RR: 20 (04 Feb 2018 05:28) (18 - 20)  SpO2: 100% (04 Feb 2018 05:28) (98% - 100%)    ________________________________________________  PHYSICAL EXAM:  GENERAL: NAD  HEENT: Normocephalic;  conjunctivae and sclerae clear; moist mucous membranes;   NECK : supple  CHEST/LUNG: Clear to auscultation bilaterally with good air entry   HEART: S1 S2  regular; no murmurs, gallops or rubs  ABDOMEN: Soft, Nontender, Nondistended; Bowel sounds present  EXTREMITIES: No cyanosis; no edema; no calf tenderness  NERVOUS SYSTEM:  Awake and alert; Oriented  to place, person and time ; no new deficits    _________________________________________________  LABS:                        11.1   4.0   )-----------( x        ( 04 Feb 2018 07:13 )             35.9     02-03    147<H>  |  111<H>  |  14  ----------------------------<  129<H>  3.0<L>   |  29  |  0.97    Ca    8.5      03 Feb 2018 07:16  Phos  2.7     02-03  Mg     1.6     02-03          CAPILLARY BLOOD GLUCOSE      POCT Blood Glucose.: 112 mg/dL (03 Feb 2018 21:29)  POCT Blood Glucose.: 94 mg/dL (03 Feb 2018 16:51)  POCT Blood Glucose.: 157 mg/dL (03 Feb 2018 11:19)        RADIOLOGY & ADDITIONAL TESTS:    Imaging Personally Reviewed:  YES    Consultant(s) Notes Reviewed:   YES    Care Discussed with Consultants : YES    Plan of care was discussed with patient and /or primary care giver; all questions and concerns were addressed and care was aligned with patient's wishes.

## 2018-02-04 NOTE — PROGRESS NOTE ADULT - PROBLEM SELECTOR PLAN 1
Presumed new onset  - C/w Digoxin + Lopressor 25 mg BID PO for rate and rhythm control  - No AC 2/2 concern for high risk of bleeding; had episode of BRBPR but H&H stable during admission  ***EGD/Colonoscopy 2/1 - one gastric ulcer that healed and multiple colonic ulcers; concern for ischemic colitis and possible IBD that'd require steroids; Bx results pending  - CHADSVASC 5, 7.2% and HASBLED 4, 9%  Cardiology Dr. 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

## 2018-02-04 NOTE — PROGRESS NOTE ADULT - ASSESSMENT
84 M PMH DM, HTN, Cirrhosis is BIB EMS after being found on floor in respiratory distress x 2 days with significant metabolic acidosis from ARF with oliguria from dehydration, lactic acidosis and fevers - admitted to MICU for Acute Hypoxic Respiratory Failure 2/2 to Sepsis and Hypovolemia - responded well to fluid resuscitation, octreotide, and albumin but found to have severe LV dysfunction (EF 25-30%) - downgraded for continued medical care  - Pending VENUS placement, no active issues, discharge planning

## 2018-02-04 NOTE — PROGRESS NOTE ADULT - PROBLEM SELECTOR PLAN 7
IMPROVE VTE Score: 3, DVT PPx w/ Lovenox 40 mg  [x] Immobilization > 24 hrs  [x] ICU/CCU stay > 24 hours  [x] Age > 60 lovenox

## 2018-02-04 NOTE — PROGRESS NOTE ADULT - ATTENDING COMMENTS
Patient was seen and examined at bedside, feeling ok, chart reviewed.  He says fees fine but is not oriented to place time or person, I did not give me clear answer when I asked about any belly pain and how is it after eating.   Denies any chest pain or feeling of his heart beats.   Physical exam:  vitals stable  HEENT: Neck supple  Heart: S1 S2 normal  Abdomen: slightly tender to deep palpation  Chest: Clear  LE: No LE edema noted.    A/p  1- A fib with RVR:   rate is better controlled,   patient is contraindicated for therapeutic anticoagulation due to high risk of fatal GI bleed in setting of multiple colonic ulcers as per recent colonoscopy.  suspecting that his continued Encephalopathy be secondary to embolic strokes.   F.u  cardio  Discussed with cardio option of chemical cardioversion, they recommend against, "As long as we cannot anticoagulate, we don't cardiovert."    Prognosis very poor, [facility] discussion pending.     2- GI bleed: GI input appreciated  secondary to Colonic ulceration: f.u Pathology to determine if they are ischemic in nature vs IBD  NSAIDS contraindicated as per GI.     3- Encephalopathy: most likely secondary to metabolic (hepatic) vs ischemic strokes.  CT head noted: chronic ischemic changes  If no improvement, would suggest to proceed for MRI though it wont , check ammonia level first.     4- Chronic ischemic colitis: suspecting it secondary to Afib, less likely SMA stenosis:  prognosis guarded as NSAIDS are contraindicated.     rest as above.

## 2018-02-04 NOTE — PROGRESS NOTE ADULT - PROBLEM SELECTOR PLAN 4
US abdomen supports cirrhosis w/ MELD score 15  - Patient to f/u w/ [facility] as outpatient in 2 weeks  ***Resolved; 378/113 at admission, now normal.

## 2018-02-04 NOTE — PROGRESS NOTE ADULT - PROBLEM SELECTOR PLAN 2
Psychiatry consulted to evaluate mental status for patient who reportedly was living independent and managing ADL  - No acute medical illness, w/u currently unremarkable  ***CT head negative  Tremors noted on examination; no medications patient is taking is likely the cause, none newly started. Possible related to patient's position near the cold window

## 2018-02-04 NOTE — PROGRESS NOTE ADULT - ATTENDING COMMENTS
CARDIOLOGY ATTENDING    Agree with above. Continue current medical therapy for LV dysfunction and PAF. Would NOT recommend DCCV or a rhythm control strategy for afib if patient can't tolerate systemic anticoagulation.

## 2018-02-04 NOTE — PROGRESS NOTE ADULT - PROBLEM SELECTOR PLAN 5
BMP wnls; resolved w/ trial of Lactulose; AAOx2 person and place  - [place] speaking, independently living at home prior to admission

## 2018-02-04 NOTE — PROGRESS NOTE ADULT - SUBJECTIVE AND OBJECTIVE BOX
pt seen and examined, no complaints on exam.  no events overnight     dextrose 5%. 1000 milliLiter(s) IV Continuous <Continuous>  digoxin     Tablet 0.125 milliGRAM(s) Oral daily  insulin lispro (HumaLOG) corrective regimen sliding scale   SubCutaneous three times a day with meals  lactobacillus acidophilus 1 Tablet(s) Oral every 8 hours  lisinopril 2.5 milliGRAM(s) Oral daily  metoprolol succinate ER 25 milliGRAM(s) Oral daily  nystatin Powder 1 Application(s) Topical two times a day  pantoprazole    Tablet 40 milliGRAM(s) Oral before breakfast                            10.8   4.8   )-----------( 119      ( 03 Feb 2018 07:16 )             35.9       Hemoglobin: 10.8 g/dL (02-03 @ 07:16)  Hemoglobin: 12.5 g/dL (02-01 @ 06:12)  Hemoglobin: 11.4 g/dL (01-31 @ 05:49)  Hemoglobin: 12.2 g/dL (01-30 @ 06:15)      02-03    147<H>  |  111<H>  |  14  ----------------------------<  129<H>  3.0<L>   |  29  |  0.97    Ca    8.5      03 Feb 2018 07:16  Phos  2.7     02-03  Mg     1.6     02-03      Creatinine Trend: 0.97<--, 0.89<--, 0.68<--, 1.03<--, 0.93<--, 0.94<--    COAGS:           T(C): 36.6 (02-03-18 @ 21:50), Max: 36.6 (02-03-18 @ 21:50)  HR: 95 (02-03-18 @ 21:50) (84 - 95)  BP: 105/69 (02-03-18 @ 21:50) (105/69 - 141/64)  RR: 18 (02-03-18 @ 21:50) (18 - 18)  SpO2: 98% (02-03-18 @ 21:50) (98% - 99%)  Wt(kg): --    I&O's Summary    03 Feb 2018 07:01 - 04 Feb 2018 05:01  --------------------------------------------------------  IN: 775 mL / OUT: 0 mL / NET: 775 mL        I&O's Summary    HEENT:   Normal oral mucosa,   Lymphatic: No obvious lymphadenopathy , no edema  Cardiovascular: Normal S1 S2, No JVD, 1/6 PEDRO murmur, Peripheral pulses palpable 2+ bilaterally  Respiratory: Coarse mechanical BS, normal effort 	  Gastrointestinal:  Soft, Non-tender, + BS	  Skin: No rashes,  No cyanosis, warm to touch  Musculoskeletal: unable to fully assess  Psychiatry:  Confused      ECHO: < from: Transthoracic Echocardiogram (01.17.18 @ 14:22) >  CONCLUSIONS:  1. Normal mitral valve. Mild to moderate mitral  regurgitation.  2. Aortic valve not seen well.  3. Aortic Root: 3.1 cm.  4. Mild left atrial enlargement.  5. Normal left ventricular internal dimensions and wall  thicknesses.  6. Endocardium not well visualized; grossly severely  reduced l left ventricular systolic function.  7. Grade II diastolic dysfunction.  8. Normal right atrium.  9. Normal right ventricular size and function.  10. RA Pressure is 8 mm Hg.  11. RV systolic pressure is 33 mm Hg.  12. Normal tricuspid valve.  13. Normal pulmonic valve.  14. Normal pericardium with no pericardial effusion.    < end of copied text >      ASSESSMENT/PLAN: 	89y Male ? cirrhosis found down by his superintendant,  admitted with respiratory failure new rapid afib, shock, found with severe LV dyfx  of unknown duration.    cont rate control with Dig , BB-  bp tolerated ACE well    GI / DVT prophylaxis.  renal follow up , off   diuretics at present .   keep K>4, mag >2.0  fall precaution / Aspiration precaution   Consider ASA and plavix if ok with GI since it appears he is high risk for full anticoagulation  h/h stable for past few days. would restart once cleared by GI.  D/W Dr Tejada

## 2018-02-05 NOTE — PROGRESS NOTE ADULT - ATTENDING COMMENTS
Patient was seen and examined by myself. Case was discussed with house staff in details. I have reviewed and agree with the plan as outlined above with edits where appropriate.  Please see attending addendum chart note

## 2018-02-05 NOTE — PROGRESS NOTE ADULT - SUBJECTIVE AND OBJECTIVE BOX
PGY 1 Note discussed with supervising resident and primary attending    Patient is a 84y old  Male who presents with a chief complaint of acute hypoxic respiratory failure and metabolic encephalopathy (23 Jan 2018 15:10)      INTERVAL HPI/OVERNIGHT EVENTS: Patient seen and examined at bedside with no new complaints; concern for lethargy overnight, signed out CT head to me, replacing K w/ IV 2/2 not tolerating PO    MEDICATIONS  (STANDING):  dextrose 5% with potassium chloride 20 mEq/L 1000 milliLiter(s) (75 mL/Hr) IV Continuous <Continuous>  digoxin     Tablet 0.125 milliGRAM(s) Oral daily  insulin lispro (HumaLOG) corrective regimen sliding scale   SubCutaneous three times a day with meals  lactobacillus acidophilus 1 Tablet(s) Oral every 8 hours  lisinopril 2.5 milliGRAM(s) Oral daily  metoprolol succinate ER 25 milliGRAM(s) Oral daily  nystatin Powder 1 Application(s) Topical two times a day  pantoprazole    Tablet 40 milliGRAM(s) Oral before breakfast    MEDICATIONS  (PRN):      __________________________________________________  REVIEW OF SYSTEMS:    CONSTITUTIONAL: No fever,   EYES: No acute visual disturbances  NECK: No pain or stiffness  RESPIRATORY: No cough; No shortness of breath  CARDIOVASCULAR: No chest pain, no palpitations  GASTROINTESTINAL: No pain. No nausea or vomiting; No diarrhea   NEUROLOGICAL: No headache or numbness, no tremors  MUSCULOSKELETAL: No joint pain, no muscle pain  GENITOURINARY: No dysuria, no frequency, no hesitancy  PSYCHIATRY: No depression , no anxiety  ALL OTHER  ROS negative        Vital Signs Last 24 Hrs  T(C): 37.5 (05 Feb 2018 04:30), Max: 38.3 (04 Feb 2018 23:45)  T(F): 99.5 (05 Feb 2018 04:30), Max: 100.9 (04 Feb 2018 23:45)  HR: 88 (05 Feb 2018 06:12) (72 - 100)  BP: 110/89 (05 Feb 2018 06:12) (110/89 - 152/74)  BP(mean): --  RR: 16 (05 Feb 2018 06:12) (16 - 18)  SpO2: 96% (05 Feb 2018 06:12) (95% - 99%)    ________________________________________________  PHYSICAL EXAM:  GENERAL: NAD  HEENT: Normocephalic;  conjunctivae and sclerae clear; moist mucous membranes;   NECK : supple  CHEST/LUNG: Clear to auscultation bilaterally with good air entry   HEART: S1 S2  regular; no murmurs, gallops or rubs  ABDOMEN: Soft, Nontender, Nondistended; Bowel sounds present  EXTREMITIES: No cyanosis; no edema; no calf tenderness  NERVOUS SYSTEM:  Awake and alert; Oriented  to place, person and time ; no new deficits    _________________________________________________  LABS:                        11.0   5.7   )-----------( 114      ( 05 Feb 2018 06:57 )             36.1     02-05    140  |  104  |  9   ----------------------------<  150<H>  3.2<L>   |  27  |  0.85    Ca    8.2<L>      05 Feb 2018 06:55  Phos  2.0     02-05  Mg     1.5     02-05    TPro  6.2  /  Alb  2.6<L>  /  TBili  0.9  /  DBili  x   /  AST  35  /  ALT  30  /  AlkPhos  72  02-05        CAPILLARY BLOOD GLUCOSE      POCT Blood Glucose.: 134 mg/dL (05 Feb 2018 08:05)  POCT Blood Glucose.: 132 mg/dL (04 Feb 2018 21:15)  POCT Blood Glucose.: 125 mg/dL (04 Feb 2018 16:59)  POCT Blood Glucose.: 147 mg/dL (04 Feb 2018 11:44)        RADIOLOGY & ADDITIONAL TESTS:    Imaging Personally Reviewed:  YES    Consultant(s) Notes Reviewed:   YES    Care Discussed with Consultants : YES    Plan of care was discussed with patient and /or primary care giver; all questions and concerns were addressed and care was aligned with patient's wishes. PGY 1 Note discussed with supervising resident and primary attending    Patient is a 84y old  Male who presents with a chief complaint of acute hypoxic respiratory failure and metabolic encephalopathy (23 Jan 2018 15:10)      INTERVAL HPI/OVERNIGHT EVENTS: Patient seen and examined at bedside with no new complaints; concern for lethargy overnight, signed out CT head to me, replacing K w/ IV 2/2 not tolerating PO, lethargic concerns - panculture sent    MEDICATIONS  (STANDING):  dextrose 5% with potassium chloride 20 mEq/L 1000 milliLiter(s) (75 mL/Hr) IV Continuous <Continuous>  digoxin     Tablet 0.125 milliGRAM(s) Oral daily  insulin lispro (HumaLOG) corrective regimen sliding scale   SubCutaneous three times a day with meals  lactobacillus acidophilus 1 Tablet(s) Oral every 8 hours  lisinopril 2.5 milliGRAM(s) Oral daily  metoprolol succinate ER 25 milliGRAM(s) Oral daily  nystatin Powder 1 Application(s) Topical two times a day  pantoprazole    Tablet 40 milliGRAM(s) Oral before breakfast    MEDICATIONS  (PRN):      __________________________________________________  REVIEW OF SYSTEMS: limited 2/2 mental status       Vital Signs Last 24 Hrs  T(C): 37.5 (05 Feb 2018 04:30), Max: 38.3 (04 Feb 2018 23:45)  T(F): 99.5 (05 Feb 2018 04:30), Max: 100.9 (04 Feb 2018 23:45)  HR: 88 (05 Feb 2018 06:12) (72 - 100)  BP: 110/89 (05 Feb 2018 06:12) (110/89 - 152/74)  BP(mean): --  RR: 16 (05 Feb 2018 06:12) (16 - 18)  SpO2: 96% (05 Feb 2018 06:12) (95% - 99%)    ________________________________________________  PHYSICAL EXAM:  GENERAL: NAD  HEENT: Normocephalic; conjunctivae and sclerae clear; moist mucous membranes;   NECK : supple  CHEST/LUNG: Clear to auscultation bilaterally with good air entry   HEART: S1 S2  regular; no murmurs, gallops or rubs  ABDOMEN: Soft, Nontender, Nondistended; Bowel sounds present  EXTREMITIES: No cyanosis; no edema; no calf tenderness  NERVOUS SYSTEM:  Awake and alert; Oriented to person only    _________________________________________________  LABS:                        11.0   5.7   )-----------( 114      ( 05 Feb 2018 06:57 )             36.1     02-05    140  |  104  |  9   ----------------------------<  150<H>  3.2<L>   |  27  |  0.85    Ca    8.2<L>      05 Feb 2018 06:55  Phos  2.0     02-05  Mg     1.5     02-05    TPro  6.2  /  Alb  2.6<L>  /  TBili  0.9  /  DBili  x   /  AST  35  /  ALT  30  /  AlkPhos  72  02-05        CAPILLARY BLOOD GLUCOSE      POCT Blood Glucose.: 134 mg/dL (05 Feb 2018 08:05)  POCT Blood Glucose.: 132 mg/dL (04 Feb 2018 21:15)  POCT Blood Glucose.: 125 mg/dL (04 Feb 2018 16:59)  POCT Blood Glucose.: 147 mg/dL (04 Feb 2018 11:44)        RADIOLOGY & ADDITIONAL TESTS:    CT head negative for an acute infarction    Imaging Personally Reviewed:  YES    Consultant(s) Notes Reviewed:   YES    Care Discussed with Consultants : YES    Plan of care was discussed with patient and /or primary care giver; all questions and concerns were addressed and care was aligned with patient's wishes.

## 2018-02-05 NOTE — PROGRESS NOTE ADULT - PROBLEM SELECTOR PLAN 6
POC Glc wnls, low 70-100s  - Holding Lantus for poor PO intake, c/w ISS  - Hb A1C 7.5% BMP wnls; resolved w/ trial of Lactulose; AAOx2 person and place  - Sammarinese speaking, independently living at home prior to admission

## 2018-02-05 NOTE — PROGRESS NOTE ADULT - ASSESSMENT
84 M PMH DM, HTN, Cirrhosis is BIB EMS after being found on floor in respiratory distress x 2 days with significant metabolic acidosis from ARF with oliguria from dehydration, lactic acidosis and fevers - admitted to MICU for Acute Hypoxic Respiratory Failure 2/2 to Sepsis and Hypovolemia - responded well to fluid resuscitation, octreotide, and albumin but found to have severe LV dysfunction (EF 25-30%) - downgraded for continued medical care  - Pending [facility] placement, no active issues, discharge planning 84 M PMH DM, HTN, Cirrhosis is BIB EMS after being found on floor in respiratory distress x 2 days with significant metabolic acidosis from ARF with oliguria from dehydration, lactic acidosis and fevers - admitted to MICU for Acute Hypoxic Respiratory Failure 2/2 to Sepsis and Hypovolemia - responded well to fluid resuscitation, octreotide, and albumin but found to have severe LV dysfunction (EF 25-30%) - downgraded for continued medical care

## 2018-02-05 NOTE — PROGRESS NOTE ADULT - PROBLEM SELECTOR PLAN 1
Presumed new onset  - C/w Digoxin + Lopressor 25 mg BID PO for rate and rhythm control  - No AC 2/2 concern for high risk of bleeding; had episode of BRBPR but H&H stable during admission  ***EGD/Colonoscopy 2/1 - one gastric ulcer that healed and multiple colonic ulcers; concern for ischemic colitis and possible IBD that'd require steroids; Bx results pending  - CHADSVASC 5, 7.2% and HASBLED 4, 9%  Cardiology Dr. Logan  ***CTA abdomen/pelvis; showed multiple findings including mild celiac stenosis but nothing for acute management 1 episode of fever alonsgide concern for lethargy  - CT head negative, UA negative, benign physical exam including no signs of URI (not sending RVP), and no WBC elevation  ***Pending BCx and Palliative consultation

## 2018-02-05 NOTE — PROGRESS NOTE ADULT - PROBLEM SELECTOR PLAN 2
Psychiatry consulted to evaluate mental status for patient who reportedly was living independent and managing ADL  - No acute medical illness, w/u currently unremarkable  ***CT head negative  Tremors noted on examination; no medications patient is taking is likely the cause, none newly started. 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 Puckett's input  Cardiolog Dr. Logan

## 2018-02-05 NOTE — PROGRESS NOTE ADULT - PROBLEM SELECTOR PLAN 7
IMPROVE VTE Individual Risk Assessment    RISK                                                          Points  [] Previous VTE                                           3  [] Thrombophilia                                        2  [] Lower limb paralysis                              2   [] Current Cancer                                       2   [x] Immobilization > 24 hrs                        1  [x] ICU/CCU stay > 24 hours                       1  [x] Age > 60                                                   1    IMPROVE VTE Score: 3, Lovenox Discontinued Lantus 2/2 poor PO intake, Glucose wnls and Hb A1C 7.5%  - C/w ISS

## 2018-02-05 NOTE — PROGRESS NOTE ADULT - PROBLEM SELECTOR PLAN 4
US abdomen supports cirrhosis w/ MELD score 15  - Patient to f/u w/ [facility] center as outpatient in 2 weeks  ***Resolved; 378/113 at admission, now normal. Resolved w/ D5W  - Nephrology Dr Moya  ***Right renal complex cyst noted, recommend repeat renal US in 3 months

## 2018-02-05 NOTE — PROGRESS NOTE ADULT - PROBLEM SELECTOR PLAN 5
BMP wnls; resolved w/ trial of Lactulose; AAOx2 person and place  - Mexican speaking, independently living at home prior to admission ***Resolved; 378/113 at admission, now normal.  - US abdomen supports cirrhosis w/ MELD score 15  - Patient to f/u w/ [facility] center as outpatient in 2 weeks as per GI

## 2018-02-05 NOTE — PROGRESS NOTE ADULT - PROBLEM SELECTOR PLAN 3
Resolved w/ D5W  - Nephrology Dr Moya  ***Right renal complex cyst noted, recommend repeat renal US in 3 months Psychiatry consulted to evaluate mental status for patient who reportedly was living independent and managing ADL  - No acute medical illness, w/u currently unremarkable  - CT head negative x 2

## 2018-02-05 NOTE — CHART NOTE - NSCHARTNOTEFT_GEN_A_CORE
MEDICAL ATTENDING NOTE    Patient is a 84y old  Male who presents with a chief complaint of acute hypoxic respiratory failure and metabolic encephalopathy (23 Jan 2018 15:10)      INTERVAL HPI/OVERNIGHT EVENTS: no new complaints    MEDICATIONS  (STANDING):  dextrose 5% with potassium chloride 20 mEq/L 1000 milliLiter(s) (75 mL/Hr) IV Continuous <Continuous>  digoxin     Tablet 0.125 milliGRAM(s) Oral daily  insulin lispro (HumaLOG) corrective regimen sliding scale   SubCutaneous three times a day with meals  lactobacillus acidophilus 1 Tablet(s) Oral every 8 hours  lisinopril 2.5 milliGRAM(s) Oral daily  metoprolol succinate ER 25 milliGRAM(s) Oral daily  nystatin Powder 1 Application(s) Topical two times a day  pantoprazole    Tablet 40 milliGRAM(s) Oral before breakfast    MEDICATIONS  (PRN):      __________________________________________________  ----------------------------------------------------------------------------------  REVIEW OF SYSTEMS: no fever, no SOB, No Chest pain; feels well      Vital Signs Last 24 Hrs  T(C): 36.9 (05 Feb 2018 14:00), Max: 38.3 (04 Feb 2018 23:45)  T(F): 98.5 (05 Feb 2018 14:00), Max: 100.9 (04 Feb 2018 23:45)  HR: 74 (05 Feb 2018 14:00) (74 - 100)  BP: 132/75 (05 Feb 2018 14:00) (110/89 - 152/74)  BP(mean): --  RR: 18 (05 Feb 2018 14:00) (16 - 18)  SpO2: 98% (05 Feb 2018 14:00) (95% - 98%)    _________________  PHYSICAL EXAM:  ---------------------------   NAD; Normocephalic;   LUNGS - no wheezing  HEART: S1 S2+   ABDOMEN: Soft, Nontender, non distended  EXTREMITIES: no cyanosis; no edema  NERVOUS SYSTEM:  initially lethargic but awoke following verbal cues; no new neurologic deficits noted; follows minimal commands    _________________________________________________  LABS:                        11.0   5.7   )-----------( 114      ( 05 Feb 2018 06:57 )             36.1     02-05    140  |  104  |  9   ----------------------------<  150<H>  3.2<L>   |  27  |  0.85    Ca    8.2<L>      05 Feb 2018 06:55  Phos  2.0     02-05  Mg     1.5     02-05    TPro  6.2  /  Alb  2.6<L>  /  TBili  0.9  /  DBili  x   /  AST  35  /  ALT  30  /  AlkPhos  72  02-05        CAPILLARY BLOOD GLUCOSE      POCT Blood Glucose.: 130 mg/dL (05 Feb 2018 16:22)  POCT Blood Glucose.: 114 mg/dL (05 Feb 2018 11:02)  POCT Blood Glucose.: 134 mg/dL (05 Feb 2018 08:05)  POCT Blood Glucose.: 132 mg/dL (04 Feb 2018 21:15)    ASSESSMENT AND PLAN:  85 y/o M with MEDICAL ATTENDING NOTE    Patient is a 84y old  Male who presents with a chief complaint of acute hypoxic respiratory failure and metabolic encephalopathy (23 Jan 2018 15:10)      INTERVAL HPI/OVERNIGHT EVENTS: no new complaints    MEDICATIONS  (STANDING):  dextrose 5% with potassium chloride 20 mEq/L 1000 milliLiter(s) (75 mL/Hr) IV Continuous <Continuous>  digoxin     Tablet 0.125 milliGRAM(s) Oral daily  insulin lispro (HumaLOG) corrective regimen sliding scale   SubCutaneous three times a day with meals  lactobacillus acidophilus 1 Tablet(s) Oral every 8 hours  lisinopril 2.5 milliGRAM(s) Oral daily  metoprolol succinate ER 25 milliGRAM(s) Oral daily  nystatin Powder 1 Application(s) Topical two times a day  pantoprazole    Tablet 40 milliGRAM(s) Oral before breakfast    MEDICATIONS  (PRN):      __________________________________________________  ----------------------------------------------------------------------------------  REVIEW OF SYSTEMS: no fever, no SOB, No Chest pain; feels well      Vital Signs Last 24 Hrs  T(C): 36.9 (05 Feb 2018 14:00), Max: 38.3 (04 Feb 2018 23:45)  T(F): 98.5 (05 Feb 2018 14:00), Max: 100.9 (04 Feb 2018 23:45)  HR: 74 (05 Feb 2018 14:00) (74 - 100)  BP: 132/75 (05 Feb 2018 14:00) (110/89 - 152/74)  BP(mean): --  RR: 18 (05 Feb 2018 14:00) (16 - 18)  SpO2: 98% (05 Feb 2018 14:00) (95% - 98%)    _________________  PHYSICAL EXAM:  ---------------------------   NAD; Normocephalic;   LUNGS - no wheezing  HEART: S1 S2+   ABDOMEN: Soft, Nontender, non distended  EXTREMITIES: no cyanosis; no edema  NERVOUS SYSTEM:  initially lethargic but awoke following verbal cues; no new neurologic deficits noted; follows minimal commands    _________________________________________________  LABS:                        11.0   5.7   )-----------( 114      ( 05 Feb 2018 06:57 )             36.1     02-05    140  |  104  |  9   ----------------------------<  150<H>  3.2<L>   |  27  |  0.85    Ca    8.2<L>      05 Feb 2018 06:55  Phos  2.0     02-05  Mg     1.5     02-05    TPro  6.2  /  Alb  2.6<L>  /  TBili  0.9  /  DBili  x   /  AST  35  /  ALT  30  /  AlkPhos  72  02-05        CAPILLARY BLOOD GLUCOSE      POCT Blood Glucose.: 130 mg/dL (05 Feb 2018 16:22)  POCT Blood Glucose.: 114 mg/dL (05 Feb 2018 11:02)  POCT Blood Glucose.: 134 mg/dL (05 Feb 2018 08:05)  POCT Blood Glucose.: 132 mg/dL (04 Feb 2018 21:15)    ASSESSMENT AND PLAN:    83 y/o M with   1. Metabolic encephalopathy : now alert but poor orientation. Was seen by psych few days ago - lacks medical decision making capacity. Only known family at this time is his next of Kin ( who is his cousin)- Mr Morales.  2. Presumed Ischemic colitis with bleeding per rectum and abd pain- no new episodes of bleeding or abd pain.  3. AFIB - rate controlled; no anticoagulation for now due to recent bleeding;   4. Liver cirrhosis with coagulopathy and abnormal LFTs- improved LFTS.   5. Dysphagia- currently on dysphagia diet as tolerated; maintain aspiration precautions.  6. Acute renal failure - renal functions stabilizing; monitor and avoid nephrotoxins  7. Diabetes-   continue Insulin sliding. Gentle IVF with dextrose overnight.  8. Fever- etiology unclear - pan culture; check CXR; if persist , plan to initiate antibiotics if recurrent fever  - continue PPI;  monitor sodium levels  - on Dysphagia diet ; maintain aspiration precautions  - overall prognosis is guarded.  supportive measures .    9. failure to thrive with risk of malnutrition- continue with ensure. Will add MVI. Patient nutritional intake is very minimal.     Patient has been slowly improving. His current mental state is likely due to the metabolic encephalopathy which has been very slow to improve.  He requires significant support with ADLS and will benefit from physical and occupational therapy for muscle strengthening and gait training. Patient prior to hospitalization was reportedly independent.   He will benefit from STR and may eventually require long term care placement.  He has been noted with Poor oral intake - likely from ongoing medical issues. He will require additional nutritional supplements and assistance  with feeding.  Met with   and  regarding patient's dispo. He will benefit from short term rehabilitation but may require long term care placement if he remains dependent in his activities of daily living and other areas of functional abilities.

## 2018-02-06 NOTE — PROGRESS NOTE ADULT - ATTENDING COMMENTS
Agree with above assessment and plan as outlined above.    - ASA/Plavix if bleeding risk is acceptable since it seems full AC will be high risk     Donald Logan MD, FACC  [facility], [facility]  [address] [place] [address]  PHONE:  [phone]  BEEPER : [phone]

## 2018-02-06 NOTE — PROGRESS NOTE ADULT - PROBLEM SELECTOR PLAN 1
1 episode of fever alonsgide concern for lethargy  - CT head negative, UA negative, benign physical exam including no signs of URI (not sending RVP), and no WBC elevation  ***Pending BCx and Palliative consultation

## 2018-02-06 NOTE — PROGRESS NOTE ADULT - PROBLEM SELECTOR PLAN 1
Patient remains significantly confused and unable to participate in medical decision making at this time

## 2018-02-06 NOTE — PROGRESS NOTE ADULT - PROBLEM SELECTOR PLAN 5
***Resolved; 378/113 at admission, now normal.  - US abdomen supports cirrhosis w/ MELD score 15  - Patient to f/u w/ [facility] center as outpatient in 2 weeks as per GI

## 2018-02-06 NOTE — PROGRESS NOTE ADULT - ATTENDING COMMENTS
Patient was seen and examined by myself. Case was discussed with house staff in details. I have reviewed and agree with the plan as outlined above with edits where appropriate.    Patient clinically stable and overall improved.  continue supportive measures.  Care as outlined.  Discharge planning

## 2018-02-06 NOTE — PROGRESS NOTE ADULT - SUBJECTIVE AND OBJECTIVE BOX
PGY 1 Note discussed with supervising resident and primary attending    Patient is a 84y old  Male who presents with a chief complaint of acute hypoxic respiratory failure and metabolic encephalopathy (2018 15:10)      INTERVAL HPI/OVERNIGHT EVENTS: Patient seen and examined at bedside with no new complaints    MEDICATIONS  (STANDING):  dextrose 5% with potassium chloride 20 mEq/L 1000 milliLiter(s) (75 mL/Hr) IV Continuous <Continuous>  digoxin     Tablet 0.125 milliGRAM(s) Oral daily  insulin lispro (HumaLOG) corrective regimen sliding scale   SubCutaneous three times a day with meals  lactobacillus acidophilus 1 Tablet(s) Oral every 8 hours  lisinopril 2.5 milliGRAM(s) Oral daily  metoprolol succinate ER 25 milliGRAM(s) Oral daily  nystatin Powder 1 Application(s) Topical two times a day  pantoprazole    Tablet 40 milliGRAM(s) Oral before breakfast    MEDICATIONS  (PRN):      __________________________________________________  REVIEW OF SYSTEMS: limited 2/ to not answering questions this AM      Vital Signs Last 24 Hrs  T(C): 36.7 (2018 05:07), Max: 36.9 (2018 14:00)  T(F): 98 (2018 05:07), Max: 98.5 (2018 14:00)  HR: 90 (2018 05:07) (74 - 96)  BP: 117/79 (2018 05:07) (112/82 - 132/75)  BP(mean): --  RR: 18 (2018 05:07) (18 - 18)  SpO2: 98% (2018 05:07) (98% - 99%)    ________________________________________________  PHYSICAL EXAM:   GENERAL: NAD  HEENT: Normocephalic;  conjunctivae and sclerae clear; moist mucous membranes;   NECK : supple  CHEST/LUNG: Clear to auscultation bilaterally with good air entry   HEART: S1 S2  regular; no murmurs, gallops or rubs  ABDOMEN: Soft, Nontender, Nondistended; Bowel sounds present  EXTREMITIES: No cyanosis; no edema; no calf tenderness  NERVOUS SYSTEM:  Awake, not alert, not answering questions    _________________________________________________  LABS:                        11.0   5.7   )-----------( 114      ( 2018 06:57 )             36.1     02-    140  |  104  |  9   ----------------------------<  150<H>  3.2<L>   |  27  |  0.85    Ca    8.2<L>      2018 06:55  Phos  2.0     -  Mg     1.5         TPro  6.2  /  Alb  2.6<L>  /  TBili  0.9  /  DBili  x   /  AST  35  /  ALT  30  /  AlkPhos  72  02-      Urinalysis Basic - ( 2018 10:41 )    Color: Yellow / Appearance: Clear / S.015 / pH: x  Gluc: x / Ketone: Negative  / Bili: Negative / Urobili: Negative   Blood: x / Protein: 15 / Nitrite: Negative   Leuk Esterase: Negative / RBC: 2-5 /HPF / WBC 0-2 /HPF   Sq Epi: x / Non Sq Epi: Occasional /HPF / Bacteria: Negative /HPF      CAPILLARY BLOOD GLUCOSE      POCT Blood Glucose.: 128 mg/dL (2018 21:11)  POCT Blood Glucose.: 130 mg/dL (2018 16:22)  POCT Blood Glucose.: 114 mg/dL (2018 11:02)  POCT Blood Glucose.: 134 mg/dL (2018 08:05)        RADIOLOGY & ADDITIONAL TESTS:    Imaging Personally Reviewed:  YES    Consultant(s) Notes Reviewed:   YES    Care Discussed with Consultants : YES    Plan of care was discussed with patient and /or primary care giver; all questions and concerns were addressed and care was aligned with patient's wishes.

## 2018-02-06 NOTE — PROGRESS NOTE ADULT - PROBLEM SELECTOR PLAN 3
Psychiatry consulted to evaluate mental status for patient who reportedly was living independent and managing ADL  - No acute medical illness, w/u currently unremarkable  - CT head negative x 2

## 2018-02-06 NOTE — PROGRESS NOTE ADULT - PROBLEM SELECTOR PLAN 4
Patient's closest relatives are his cousins in [place]-Layne Barnes and his wife Sorin Barnes.  Layne is deferring to his wife Sorin for medical decision making.  Spoke with Sorin on the phone today and completed a MOLST - DNR/DNI/DNH, comfort measures, no feeding tube, use antibiotics, If necessary.  She is agreeable with hospice in a facility, after rehab.

## 2018-02-06 NOTE — PROGRESS NOTE ADULT - PROBLEM SELECTOR PLAN 6
BMP wnls; resolved w/ trial of Lactulose; AAOx2 person and place  - Samoan speaking, independently living at home prior to admission

## 2018-02-06 NOTE — PROGRESS NOTE ADULT - SUBJECTIVE AND OBJECTIVE BOX
pt seen and examined, no complaints on exam.  no events overnight     dextrose 5% with potassium chloride 20 mEq/L 1000 milliLiter(s) IV Continuous <Continuous>  digoxin     Tablet 0.125 milliGRAM(s) Oral daily  insulin lispro (HumaLOG) corrective regimen sliding scale   SubCutaneous three times a day with meals  lactobacillus acidophilus 1 Tablet(s) Oral every 8 hours  lisinopril 2.5 milliGRAM(s) Oral daily  metoprolol succinate ER 25 milliGRAM(s) Oral daily  nystatin Powder 1 Application(s) Topical two times a day  pantoprazole    Tablet 40 milliGRAM(s) Oral before breakfast                            11.0   5.7   )-----------( 114      ( 05 Feb 2018 06:57 )             36.1       Hemoglobin: 11.0 g/dL (02-05 @ 06:57)  Hemoglobin: 11.1 g/dL (02-04 @ 07:13)  Hemoglobin: 10.8 g/dL (02-03 @ 07:16)      02-06    141  |  105  |  8   ----------------------------<  120<H>  3.4<L>   |  27  |  0.73    Ca    8.5      06 Feb 2018 07:52  Phos  2.4     02-06  Mg     1.6     02-06    TPro  6.2  /  Alb  2.6<L>  /  TBili  0.9  /  DBili  x   /  AST  35  /  ALT  30  /  AlkPhos  72  02-05    Creatinine Trend: 0.73<--, 0.85<--, 0.89<--, 0.78<--, 0.97<--, 0.89<--    COAGS:           T(C): 36.7 (02-06-18 @ 05:07), Max: 36.9 (02-05-18 @ 14:00)  HR: 90 (02-06-18 @ 05:07) (74 - 96)  BP: 117/79 (02-06-18 @ 05:07) (112/82 - 132/75)  RR: 18 (02-06-18 @ 05:07) (18 - 18)  SpO2: 98% (02-06-18 @ 05:07) (98% - 99%)  Wt(kg): --    I&O's Summary      HEENT:   Normal oral mucosa,   Lymphatic: No obvious lymphadenopathy , no edema  Cardiovascular: Normal S1 S2, No JVD, 1/6 PEDRO murmur, Peripheral pulses palpable 2+ bilaterally  Respiratory: Coarse mechanical BS, normal effort 	  Gastrointestinal:  Soft, Non-tender, + BS	  Skin: No rashes,  No cyanosis, warm to touch  Musculoskeletal: unable to fully assess  Psychiatry:  Confused      ECHO: < from: Transthoracic Echocardiogram (01.17.18 @ 14:22) >  CONCLUSIONS:  1. Normal mitral valve. Mild to moderate mitral  regurgitation.  2. Aortic valve not seen well.  3. Aortic Root: 3.1 cm.  4. Mild left atrial enlargement.  5. Normal left ventricular internal dimensions and wall  thicknesses.  6. Endocardium not well visualized; grossly severely  reduced l left ventricular systolic function.  7. Grade II diastolic dysfunction.  8. Normal right atrium.  9. Normal right ventricular size and function.  10. RA Pressure is 8 mm Hg.  11. RV systolic pressure is 33 mm Hg.  12. Normal tricuspid valve.  13. Normal pulmonic valve.  14. Normal pericardium with no pericardial effusion.    < end of copied text >      ASSESSMENT/PLAN: 	89y Male ? cirrhosis found down by his superintendant,  admitted with respiratory failure new rapid afib, shock, found with severe LV dyfx  of unknown duration.    cont rate control with Dig , BB-  bp tolerated ACE well    GI / DVT prophylaxis.  renal follow up , off   diuretics at present .   keep K>4, mag >2.0  GI follow up - no a/c at present   fall precaution / Aspiration precaution    D/W  D/W Dr Logan

## 2018-02-06 NOTE — PROGRESS NOTE ADULT - PROBLEM SELECTOR PLAN 2
Presumed new onset- CHADSVASC 5, 7.2% and HASBLED 4, 9%  - C/w Digoxin + Lopressor 25 mg BID PO for rate and rhythm control  - No AC 2/2 concern for high risk of bleeding  - EGD/Colonoscopy 2/1 - one gastric ulcer that healed and multiple colonic ulcers; concern for ischemic colitis and possible IBD that'd require steroids; Bx results pending  - CTA abdomen/pelvis; showed multiple findings including mild celiac stenosis but nothing fr acute management  ***AC w/ Plavix as per GI Dr. Puckett's input  Cardiolog Dr. Logan Presumed new onset- CHADSVASC 5, 7.2% and HASBLED 4, 9%  - C/w Digoxin + Lopressor 25 mg BID PO for rate and rhythm control  - No AC 2/2 concern for high risk of bleeding  - EGD/Colonoscopy 2/1 - one gastric ulcer that healed and multiple colonic ulcers; concern for ischemic colitis and possible IBD that'd require steroids; Bx results show chronic gastritis, ulcerated colonic mucosa, etc  - CTA abdomen/pelvis; showed multiple findings including mild celiac stenosis but nothing for acute management  ***AC w/ Plavix as per GI Dr. Puckett's input  Cardiolog Dr. Logan

## 2018-02-06 NOTE — PROGRESS NOTE ADULT - PROBLEM SELECTOR PLAN 3
Patient with significant systolic heart failure, with an EF 25-30%, cardiology following, patient currently asymptomatic

## 2018-02-06 NOTE — PROGRESS NOTE ADULT - NSHPATTENDINGPLANDISCUSS_GEN_ALL_CORE
micu resident
resident
micu resident
pts family and resident
resident
icu team on rounds
resident
micu resident
Dr. Schuler

## 2018-02-06 NOTE — PROGRESS NOTE ADULT - SUBJECTIVE AND OBJECTIVE BOX
OVERNIGHT EVENTS: None, patient remains confused with poor appetite. Palliative reconsulted to discuss goals of care with the family.     Review of Systems: Unable to obtain due to poor mentation    MEDICATIONS  (STANDING):  dextrose 5% with potassium chloride 20 mEq/L 1000 milliLiter(s) (75 mL/Hr) IV Continuous <Continuous>  digoxin     Tablet 0.125 milliGRAM(s) Oral daily  insulin lispro (HumaLOG) corrective regimen sliding scale   SubCutaneous three times a day with meals  lactobacillus acidophilus 1 Tablet(s) Oral every 8 hours  lisinopril 2.5 milliGRAM(s) Oral daily  metoprolol succinate ER 25 milliGRAM(s) Oral daily  nystatin Powder 1 Application(s) Topical two times a day  pantoprazole    Tablet 40 milliGRAM(s) Oral before breakfast    PHYSICAL EXAM:  Vital Signs Last 24 Hrs  T(C): 36.7 (2018 05:07), Max: 36.9 (2018 14:00)  T(F): 98 (2018 05:07), Max: 98.5 (2018 14:00)  HR: 90 (2018 05:07) (74 - 96)  BP: 117/79 (2018 05:07) (112/82 - 132/75)  RR: 18 (2018 05:07) (18 - 18)  SpO2: 98% (2018 05:07) (98% - 99%)    General: alert  oriented x __1-2__ verbal  Karnofsky Performance Score/Palliative Performance Status Version2:  30%    HEENT: normal    Lungs: comfortable  CV: normal   GI: incontinent  :  incontinent    Musculoskeletal: Weakness,  bedbound  Skin: normal    Neuro:  cognitive impairment   Oral intake ability: Minimal  Diet: Dysphagia    LABS:                          10.5   6.2   )-----------( 107      ( 2018 07:52 )             34.7     02-06    141  |  105  |  8   ----------------------------<  120<H>  3.4<L>   |  27  |  0.73    Ca    8.5      2018 07:52  Phos  2.4     02-06  Mg     1.6     02-06    TPro  6.2  /  Alb  2.6<L>  /  TBili  0.9  /  DBili  x   /  AST  35  /  ALT  30  /  AlkPhos  72  -    Urinalysis Basic - ( 2018 10:41 )    Color: Yellow / Appearance: Clear / S.015 / pH: x  Gluc: x / Ketone: Negative  / Bili: Negative / Urobili: Negative   Blood: x / Protein: 15 / Nitrite: Negative   Leuk Esterase: Negative / RBC: 2-5 /HPF / WBC 0-2 /HPF   Sq Epi: x / Non Sq Epi: Occasional /HPF / Bacteria: Negative /HPF    RADIOLOGY & ADDITIONAL STUDIES: < from: Transthoracic Echocardiogram (18 @ 14:22) >  CONCLUSIONS:  EF 25-30%  1. Normal mitral valve. Mild to moderate mitral  regurgitation.  2. Aortic valve not seen well.  3. Aortic Root: 3.1 cm.  4. Mild left atrial enlargement.  5. Normal left ventricular internal dimensions and wall  thicknesses.  6. Endocardium not well visualized; grossly severely  reduced l left ventricular systolic function.  7. Grade II diastolic dysfunction.  8. Normal right atrium.  9. Normal right ventricular size and function.  10. RA Pressure is 8 mm Hg.  11. RV systolic pressure is 33 mm Hg.  12. Normal tricuspid valve.  13. Normal pulmonic valve.  14. Normal pericardium with no pericardial effusion.    < end of copied text >    ADVANCE DIRECTIVES: DNR/DNI

## 2018-02-07 NOTE — PROGRESS NOTE ADULT - SUBJECTIVE AND OBJECTIVE BOX
PGY 1 Note discussed with supervising resident and primary attending    Patient is a 84y old  Male who presents with a chief complaint of acute hypoxic respiratory failure and metabolic encephalopathy (2018 15:10)      INTERVAL HPI/OVERNIGHT EVENTS: Patient seen and examined at bedside with no new complaints - met friends yesterday who confirm that he is similar to baseline but more forgettable, plan made for DNR/DNI, no feeding tube as per palliative - the risks of AC discussed with the family and decision made to not AC 2/2 higher risk of bleeding because of the known colonic lesions known to bleed, discharge planning    MEDICATIONS  (STANDING):  dextrose 5% with potassium chloride 20 mEq/L 1000 milliLiter(s) (75 mL/Hr) IV Continuous <Continuous>  digoxin     Tablet 0.125 milliGRAM(s) Oral daily  insulin lispro (HumaLOG) corrective regimen sliding scale   SubCutaneous three times a day with meals  lactobacillus acidophilus 1 Tablet(s) Oral every 8 hours  lisinopril 2.5 milliGRAM(s) Oral daily  metoprolol succinate ER 25 milliGRAM(s) Oral daily  nystatin Powder 1 Application(s) Topical two times a day  pantoprazole    Tablet 40 milliGRAM(s) Oral before breakfast    MEDICATIONS  (PRN):      __________________________________________________  REVIEW OF SYSTEMS:    CONSTITUTIONAL: No fever,   EYES: No acute visual disturbances  NECK: No pain or stiffness  RESPIRATORY: No cough; No shortness of breath  CARDIOVASCULAR: No chest pain, no palpitations  GASTROINTESTINAL: No pain. No nausea or vomiting; No diarrhea   NEUROLOGICAL: No headache or numbness, no tremors  MUSCULOSKELETAL: No joint pain, no muscle pain  GENITOURINARY: No dysuria, no frequency, no hesitancy  PSYCHIATRY: No depression , no anxiety  ALL OTHER  ROS negative        Vital Signs Last 24 Hrs  T(C): 36.9 (2018 05:09), Max: 37 (2018 15:00)  T(F): 98.4 (2018 05:09), Max: 98.6 (2018 15:00)  HR: 94 (2018 05:09) (87 - 94)  BP: 150/70 (2018 05:09) (127/65 - 150/70)  BP(mean): --  RR: 18 (2018 05:09) (16 - 18)  SpO2: 99% (2018 05:09) (93% - 99%)    ________________________________________________  PHYSICAL EXAM:  GENERAL: NAD  HEENT: Normocephalic;  conjunctivae and sclerae clear; moist mucous membranes;   NECK : supple  CHEST/LUNG: Clear to auscultation bilaterally with good air entry   HEART: S1 S2  regular; no murmurs, gallops or rubs  ABDOMEN: Soft, Nontender, Nondistended; Bowel sounds present  EXTREMITIES: No cyanosis; no edema; no calf tenderness  NERVOUS SYSTEM:  Awake - follows my fingers    _________________________________________________  LABS:                        10.5   6.2   )-----------( 107      ( 2018 07:52 )             34.7     02-06    141  |  105  |  8   ----------------------------<  120<H>  3.4<L>   |  27  |  0.73    Ca    8.5      2018 07:52  Phos  2.4     02-06  Mg     1.6     02-06        Urinalysis Basic - ( 2018 10:41 )    Color: Yellow / Appearance: Clear / S.015 / pH: x  Gluc: x / Ketone: Negative  / Bili: Negative / Urobili: Negative   Blood: x / Protein: 15 / Nitrite: Negative   Leuk Esterase: Negative / RBC: 2-5 /HPF / WBC 0-2 /HPF   Sq Epi: x / Non Sq Epi: Occasional /HPF / Bacteria: Negative /HPF      CAPILLARY BLOOD GLUCOSE      POCT Blood Glucose.: 101 mg/dL (2018 21:41)  POCT Blood Glucose.: 100 mg/dL (2018 16:31)  POCT Blood Glucose.: 102 mg/dL (2018 11:30)  POCT Blood Glucose.: 112 mg/dL (2018 08:06)        RADIOLOGY & ADDITIONAL TESTS:    Imaging Personally Reviewed:  YES    Consultant(s) Notes Reviewed:   YES    Care Discussed with Consultants : YES    Plan of care was discussed with patient and /or primary care giver; all questions and concerns were addressed and care was aligned with patient's wishes. PGY 1 Note discussed with supervising resident and primary attending    Patient is a 84y old  Male who presents with a chief complaint of acute hypoxic respiratory failure and metabolic encephalopathy (2018 15:10)      INTERVAL HPI/OVERNIGHT EVENTS: Patient seen and examined at bedside with no new complaints - met friends yesterday who confirm that he is similar to baseline but more forgettable, plan made for DNR/DNI, no feeding tube as per palliative - the risks of AC discussed with the family and decision made to not AC 2/2 higher risk of bleeding because of the known colonic lesions known to bleed, discharge planning    MEDICATIONS  (STANDING):  dextrose 5% with potassium chloride 20 mEq/L 1000 milliLiter(s) (75 mL/Hr) IV Continuous <Continuous>  digoxin     Tablet 0.125 milliGRAM(s) Oral daily  insulin lispro (HumaLOG) corrective regimen sliding scale   SubCutaneous three times a day with meals  lactobacillus acidophilus 1 Tablet(s) Oral every 8 hours  lisinopril 2.5 milliGRAM(s) Oral daily  metoprolol succinate ER 25 milliGRAM(s) Oral daily  nystatin Powder 1 Application(s) Topical two times a day  pantoprazole    Tablet 40 milliGRAM(s) Oral before breakfast    MEDICATIONS  (PRN):      __________________________________________________  REVIEW OF SYSTEMS:    CONSTITUTIONAL: No fever,   EYES: No acute visual disturbances  NECK: No pain or stiffness  RESPIRATORY: No cough; No shortness of breath  CARDIOVASCULAR: No chest pain, no palpitations  GASTROINTESTINAL: No pain. No nausea or vomiting; No diarrhea   NEUROLOGICAL: No headache or numbness, no tremors        Vital Signs Last 24 Hrs  T(C): 36.9 (2018 05:09), Max: 37 (2018 15:00)  T(F): 98.4 (2018 05:09), Max: 98.6 (2018 15:00)  HR: 94 (2018 05:09) (87 - 94)  BP: 150/70 (2018 05:09) (127/65 - 150/70)  BP(mean): --  RR: 18 (2018 05:09) (16 - 18)  SpO2: 99% (2018 05:09) (93% - 99%)    ________________________________________________  PHYSICAL EXAM:  GENERAL: NAD  HEENT: Normocephalic;  conjunctivae and sclerae clear; moist mucous membranes;   NECK : supple  CHEST/LUNG: Clear to auscultation bilaterally with good air entry   HEART: S1 S2  regular; no murmurs, gallops or rubs  ABDOMEN: Soft, Nontender, Nondistended; Bowel sounds present  EXTREMITIES: No cyanosis; no edema; no calf tenderness  NERVOUS SYSTEM:  Awake - follows my fingers    _________________________________________________  LABS:                        10.5   6.2   )-----------( 107      ( 2018 07:52 )             34.7     02-06    141  |  105  |  8   ----------------------------<  120<H>  3.4<L>   |  27  |  0.73    Ca    8.5      2018 07:52  Phos  2.4     02-06  Mg     1.6     02-06        Urinalysis Basic - ( 2018 10:41 )    Color: Yellow / Appearance: Clear / S.015 / pH: x  Gluc: x / Ketone: Negative  / Bili: Negative / Urobili: Negative   Blood: x / Protein: 15 / Nitrite: Negative   Leuk Esterase: Negative / RBC: 2-5 /HPF / WBC 0-2 /HPF   Sq Epi: x / Non Sq Epi: Occasional /HPF / Bacteria: Negative /HPF      CAPILLARY BLOOD GLUCOSE      POCT Blood Glucose.: 101 mg/dL (2018 21:41)  POCT Blood Glucose.: 100 mg/dL (2018 16:31)  POCT Blood Glucose.: 102 mg/dL (2018 11:30)  POCT Blood Glucose.: 112 mg/dL (2018 08:06)      Consultant(s) Notes Reviewed:   YES    Care Discussed with Consultants : YES    Plan of care was discussed with patient and /or primary care giver; all questions and concerns were addressed and care was aligned with patient's wishes.

## 2018-02-07 NOTE — PROGRESS NOTE ADULT - SUBJECTIVE AND OBJECTIVE BOX
pt seen and examined, no complaints on exam.  ROS- . no events overnight     dextrose 5% with potassium chloride 20 mEq/L 1000 milliLiter(s) IV Continuous <Continuous>  digoxin     Tablet 0.125 milliGRAM(s) Oral daily  insulin lispro (HumaLOG) corrective regimen sliding scale   SubCutaneous three times a day with meals  lactobacillus acidophilus 1 Tablet(s) Oral every 8 hours  lisinopril 2.5 milliGRAM(s) Oral daily  metoprolol succinate ER 25 milliGRAM(s) Oral daily  nystatin Powder 1 Application(s) Topical two times a day  pantoprazole    Tablet 40 milliGRAM(s) Oral before breakfast                            10.5   6.2   )-----------( 107      ( 06 Feb 2018 07:52 )             34.7       Hemoglobin: 10.5 g/dL (02-06 @ 07:52)  Hemoglobin: 11.0 g/dL (02-05 @ 06:57)  Hemoglobin: 11.1 g/dL (02-04 @ 07:13)  Hemoglobin: 10.8 g/dL (02-03 @ 07:16)      02-06    141  |  105  |  8   ----------------------------<  120<H>  3.4<L>   |  27  |  0.73    Ca    8.5      06 Feb 2018 07:52  Phos  2.4     02-06  Mg     1.6     02-06      Creatinine Trend: 0.73<--, 0.85<--, 0.89<--, 0.78<--, 0.97<--, 0.89<--    COAGS:           T(C): 37 (02-06-18 @ 21:48), Max: 37 (02-06-18 @ 15:00)  HR: 90 (02-06-18 @ 21:48) (87 - 90)  BP: 143/90 (02-06-18 @ 21:48) (117/79 - 143/90)  RR: 18 (02-06-18 @ 21:48) (16 - 18)  SpO2: 93% (02-06-18 @ 21:48) (93% - 98%)  Wt(kg): --    I&O's Summary    HEENT:   Normal oral mucosa,   Lymphatic: No obvious lymphadenopathy , no edema  Cardiovascular: Normal S1 S2, No JVD, 1/6 PEDRO murmur, Peripheral pulses palpable 2+ bilaterally  Respiratory: Coarse mechanical BS, normal effort 	  Gastrointestinal:  Soft, Non-tender, + BS	  Skin: No rashes,  No cyanosis, warm to touch  Musculoskeletal: unable to fully assess  Psychiatry:  Confused      ECHO: < from: Transthoracic Echocardiogram (01.17.18 @ 14:22) >  CONCLUSIONS:  1. Normal mitral valve. Mild to moderate mitral  regurgitation.  2. Aortic valve not seen well.  3. Aortic Root: 3.1 cm.  4. Mild left atrial enlargement.  5. Normal left ventricular internal dimensions and wall  thicknesses.  6. Endocardium not well visualized; grossly severely  reduced l left ventricular systolic function.  7. Grade II diastolic dysfunction.  8. Normal right atrium.  9. Normal right ventricular size and function.  10. RA Pressure is 8 mm Hg.  11. RV systolic pressure is 33 mm Hg.  12. Normal tricuspid valve.  13. Normal pulmonic valve.  14. Normal pericardium with no pericardial effusion.    < end of copied text >      ASSESSMENT/PLAN: 	89y Male ? cirrhosis found down by his superintendant,  admitted with respiratory failure new rapid afib, shock, found with severe LV dyfx  of unknown duration.    cont rate control with Dig , BB-  bp tolerated ACE well    GI / DVT prophylaxis.  renal follow up , off   diuretics at present .   keep K>4, mag >2.0  GI follow up - no a/c at present - would like to start ASA/ Plavix instead due to high risk , for afib.   fall precaution / Aspiration precaution    D/W Dr Logan

## 2018-02-07 NOTE — PROGRESS NOTE ADULT - PROBLEM SELECTOR PLAN 2
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 Puckett's input; no AC for now  Cardiology Dr. Logan

## 2018-02-07 NOTE — PROGRESS NOTE ADULT - PROBLEM SELECTOR PLAN 6
BMP wnls; resolved w/ trial of Lactulose; AAOx2 person and place  - [place] speaking, independently living at home prior to admission

## 2018-02-07 NOTE — PROGRESS NOTE ADULT - PROBLEM SELECTOR PLAN 1
1 episode of fever alonsgide concern for lethargy  - CT head negative, UA negative, benign physical exam including no signs of URI (not sending RVP), and no WBC elevation  ***Pending BCx and Palliative consultation resolved fever  cultures so far negative  continue to monitor  -

## 2018-02-07 NOTE — PROGRESS NOTE ADULT - PROBLEM SELECTOR PLAN 4
Resolved w/ D5W  - Nephrology Dr Moya  ***Right renal complex cyst noted, recommend repeat renal US in 3 months Resolved.   ***Right renal complex cyst noted, recommend repeat renal US in 3 months

## 2018-02-07 NOTE — PROGRESS NOTE ADULT - PROBLEM SELECTOR PLAN 5
***Resolved; 378/113 at admission, now normal.  - US abdomen supports cirrhosis w/ MELD score 15  - Patient to f/u w/ [facility] as outpatient in 2 weeks as per GI

## 2018-02-07 NOTE — PROGRESS NOTE ADULT - ATTENDING COMMENTS
Patient was seen and examined by myself. Case was discussed with house staff in details. I have reviewed and agree with the plan as outlined above with edits where appropriate.  Patient remains cklinically fairly stable.  Awaiting discharge to rehab; may eventually require long term care  Plan as outlined above.

## 2018-02-07 NOTE — PROGRESS NOTE ADULT - ATTENDING COMMENTS
Patient seen and examined, agree with above assessment and plan as transcribed above.    - Asa/plavix if ok with GI  - If that still portends a high bleeding risk then just ASA    Donald Logan MD, [facility], [facility]  [address] [place] [address]  PHONE:  [phone]  BEEPER : [phone]

## 2018-02-08 NOTE — PROGRESS NOTE ADULT - PROBLEM SELECTOR PLAN 5
***Resolved; 378/113 at admission, now normal.  - US abdomen supports cirrhosis w/ MELD score 15  - Patient to f/u w/ [facility] as outpatient in 2 weeks as per GI

## 2018-02-08 NOTE — PHYSICAL THERAPY INITIAL EVALUATION ADULT - DIAGNOSIS, PT EVAL
Decreased strength, decreased cognition, decreased sitting tolerance, back pain, decreased sitting balance, contributing to diminished activity tolerance and functional mobility
Decreased strength, decreased endurance contributing to decreased functional mobility and safety

## 2018-02-08 NOTE — PHYSICAL THERAPY INITIAL EVALUATION ADULT - PLANNED THERAPY INTERVENTIONS, PT EVAL
gait training/transfer training/bed mobility training/strengthening/neuromuscular re-education/balance training
neuromuscular re-education/ROM/balance training/stretching/bed mobility training/strengthening/gait training

## 2018-02-08 NOTE — PROGRESS NOTE ADULT - ATTENDING COMMENTS
Agree with above assessment and plan as outlined above.    - appears high risk for AC  - ASA if ok from GI prospective    Donald Logan MD, FACC  [facility], [facility]  [address] [place] [address]  PHONE:  [phone]  BEEPER : [phone]

## 2018-02-08 NOTE — PHYSICAL THERAPY INITIAL EVALUATION ADULT - IMPAIRMENTS FOUND, PT EVAL
aerobic capacity/endurance/poor safety awareness/cognitive impairment/gait, locomotion, and balance/muscle strength
gait, locomotion, and balance/integumentary integrity/muscle strength/aerobic capacity/endurance/arousal, attention, and cognition/ROM

## 2018-02-08 NOTE — PHYSICAL THERAPY INITIAL EVALUATION ADULT - BED MOBILITY LIMITATIONS, REHAB EVAL
decreased ability to use legs for bridging/pushing/decreased ability to use arms for pushing/pulling/impaired ability to control trunk for mobility
impaired ability to control trunk for mobility/decreased ability to use arms for pushing/pulling/decreased ability to use legs for bridging/pushing

## 2018-02-08 NOTE — PROGRESS NOTE ADULT - ATTENDING COMMENTS
Patient was seen and examined by myself. Case was discussed with house staff in details. I have reviewed and agree with the plan as outlined above with edits where appropriate.  83 y/o M with   1. bleeding per rectum- resolved  2. AFIB - rate controlled; no anticoagulation for now due to recent GI bleed presumably from ischemic colitis.  3. liver cirrhosis - stable; LFTs improved  4. Dysphagia- stable; currently on dysphagia diet as tolerated; maintain aspiration precautions.  5. Acute renal failure - resolving  6. Diabetes-  stable; continue monitoring  7. Hypernatremia- resolving  8. Metabolic encephalopathy- resolving  9. Ischemic colitis- resolving.  10 Fever- one isolated episode- cultures were negative.  DISPO- discharge planning; awaiting rehab placement.  Debility and deconditioning- PT as tolerated. He may eventually require long term care placement.  Prognosis is guarded.   Other plan as outlined above.

## 2018-02-08 NOTE — PROGRESS NOTE ADULT - SUBJECTIVE AND OBJECTIVE BOX
PGY 1 Note discussed with supervising resident and primary attending    Patient is a 84y old  Male who presents with a chief complaint of acute hypoxic respiratory failure and metabolic encephalopathy (23 Jan 2018 15:10)      INTERVAL HPI/OVERNIGHT EVENTS: Patient seen and examined at bedside with no new complaints    MEDICATIONS  (STANDING):  dextrose 5% with potassium chloride 20 mEq/L 1000 milliLiter(s) (75 mL/Hr) IV Continuous <Continuous>  digoxin     Tablet 0.125 milliGRAM(s) Oral daily  insulin lispro (HumaLOG) corrective regimen sliding scale   SubCutaneous three times a day with meals  lactobacillus acidophilus 1 Tablet(s) Oral every 8 hours  lisinopril 2.5 milliGRAM(s) Oral daily  metoprolol succinate ER 25 milliGRAM(s) Oral daily  nystatin Powder 1 Application(s) Topical two times a day  pantoprazole    Tablet 40 milliGRAM(s) Oral before breakfast    MEDICATIONS  (PRN):      __________________________________________________  REVIEW OF SYSTEMS:    CONSTITUTIONAL: No fever,   EYES: No acute visual disturbances  NECK: No pain or stiffness  RESPIRATORY: No cough; No shortness of breath  CARDIOVASCULAR: No chest pain, no palpitations  GASTROINTESTINAL: No pain. No nausea or vomiting; No diarrhea   NEUROLOGICAL: No headache or numbness, no tremors  MUSCULOSKELETAL: No joint pain, no muscle pain  GENITOURINARY: No dysuria, no frequency, no hesitancy  PSYCHIATRY: No depression , no anxiety  ALL OTHER  ROS negative        Vital Signs Last 24 Hrs  T(C): 36.6 (08 Feb 2018 05:06), Max: 36.7 (07 Feb 2018 21:11)  T(F): 97.9 (08 Feb 2018 05:06), Max: 98 (07 Feb 2018 21:11)  HR: 93 (08 Feb 2018 05:06) (68 - 93)  BP: 144/73 (08 Feb 2018 05:06) (120/61 - 144/73)  BP(mean): --  RR: 18 (08 Feb 2018 05:06) (18 - 18)  SpO2: 98% (08 Feb 2018 05:06) (94% - 98%)    ________________________________________________  PHYSICAL EXAM:  GENERAL: NAD  HEENT: Normocephalic;  conjunctivae and sclerae clear; moist mucous membranes;   NECK : supple  CHEST/LUNG: Clear to auscultation bilaterally with good air entry   HEART: S1 S2  regular; no murmurs, gallops or rubs  ABDOMEN: Soft, Nontender, Nondistended; Bowel sounds present  EXTREMITIES: No cyanosis; no edema; no calf tenderness  NERVOUS SYSTEM:  Awake and alert; Oriented  to place, person and time ; no new deficits    _________________________________________________  LABS:                        11.5   6.0   )-----------( 122      ( 08 Feb 2018 06:09 )             36.2     02-08    144  |  106  |  11  ----------------------------<  98  3.7   |  28  |  0.70    Ca    8.6      08 Feb 2018 06:09  Phos  3.0     02-08  Mg     1.8     02-08          CAPILLARY BLOOD GLUCOSE      POCT Blood Glucose.: 88 mg/dL (07 Feb 2018 22:00)  POCT Blood Glucose.: 91 mg/dL (07 Feb 2018 16:24)  POCT Blood Glucose.: 114 mg/dL (07 Feb 2018 11:59)  POCT Blood Glucose.: 102 mg/dL (07 Feb 2018 08:01)        RADIOLOGY & ADDITIONAL TESTS:    Imaging Personally Reviewed:  YES    Consultant(s) Notes Reviewed:   YES    Care Discussed with Consultants : YES    Plan of care was discussed with patient and /or primary care giver; all questions and concerns were addressed and care was aligned with patient's wishes. PGY 1 Note discussed with supervising resident and primary attending    Patient is a 84y old  Male who presents with a chief complaint of acute hypoxic respiratory failure and metabolic encephalopathy (23 Jan 2018 15:10)      INTERVAL HPI/OVERNIGHT EVENTS: Patient seen and examined at bedside with no new complaints - AAO x 1, responding to questions but unable to assess answers, no distress    MEDICATIONS  (STANDING):  dextrose 5% with potassium chloride 20 mEq/L 1000 milliLiter(s) (75 mL/Hr) IV Continuous <Continuous>  digoxin     Tablet 0.125 milliGRAM(s) Oral daily  insulin lispro (HumaLOG) corrective regimen sliding scale   SubCutaneous three times a day with meals  lactobacillus acidophilus 1 Tablet(s) Oral every 8 hours  lisinopril 2.5 milliGRAM(s) Oral daily  metoprolol succinate ER 25 milliGRAM(s) Oral daily  nystatin Powder 1 Application(s) Topical two times a day  pantoprazole    Tablet 40 milliGRAM(s) Oral before breakfast    MEDICATIONS  (PRN):      __________________________________________________  REVIEW OF SYSTEMS:    CONSTITUTIONAL: No fever,   EYES: No acute visual disturbances  NECK: No pain or stiffness  RESPIRATORY: No cough; No shortness of breath  CARDIOVASCULAR: No chest pain, no palpitations  GASTROINTESTINAL: No pain. No nausea or vomiting; No diarrhea   NEUROLOGICAL: No headache or numbness, no tremors  MUSCULOSKELETAL: No joint pain, no muscle pain  GENITOURINARY: No dysuria, no frequency, no hesitancy  PSYCHIATRY: No depression , no anxiety  ALL OTHER  ROS negative        Vital Signs Last 24 Hrs  T(C): 36.6 (08 Feb 2018 05:06), Max: 36.7 (07 Feb 2018 21:11)  T(F): 97.9 (08 Feb 2018 05:06), Max: 98 (07 Feb 2018 21:11)  HR: 93 (08 Feb 2018 05:06) (68 - 93)  BP: 144/73 (08 Feb 2018 05:06) (120/61 - 144/73)  BP(mean): --  RR: 18 (08 Feb 2018 05:06) (18 - 18)  SpO2: 98% (08 Feb 2018 05:06) (94% - 98%)    ________________________________________________  PHYSICAL EXAM:  GENERAL: NAD  HEENT: Normocephalic;  conjunctivae and sclerae clear; moist mucous membranes;   NECK : supple  CHEST/LUNG: Clear to auscultation bilaterally with good air entry   HEART: S1 S2  regular; no murmurs, gallops or rubs  ABDOMEN: Soft, Nontender, Nondistended; Bowel sounds present  EXTREMITIES: No cyanosis; no edema; no calf tenderness  NERVOUS SYSTEM:  Awake and alert; Oriented  to place, person and time ; no new deficits    _________________________________________________  LABS:                        11.5   6.0   )-----------( 122      ( 08 Feb 2018 06:09 )             36.2     02-08    144  |  106  |  11  ----------------------------<  98  3.7   |  28  |  0.70    Ca    8.6      08 Feb 2018 06:09  Phos  3.0     02-08  Mg     1.8     02-08          CAPILLARY BLOOD GLUCOSE      POCT Blood Glucose.: 88 mg/dL (07 Feb 2018 22:00)  POCT Blood Glucose.: 91 mg/dL (07 Feb 2018 16:24)  POCT Blood Glucose.: 114 mg/dL (07 Feb 2018 11:59)  POCT Blood Glucose.: 102 mg/dL (07 Feb 2018 08:01)        RADIOLOGY & ADDITIONAL TESTS:    Imaging Personally Reviewed:  YES    Consultant(s) Notes Reviewed:   YES    Care Discussed with Consultants : YES    Plan of care was discussed with patient and /or primary care giver; all questions and concerns were addressed and care was aligned with patient's wishes. PGY 1 Note discussed with supervising resident and primary attending    Patient is a 84y old  Male who presents with a chief complaint of acute hypoxic respiratory failure and metabolic encephalopathy (23 Jan 2018 15:10)      INTERVAL HPI/OVERNIGHT EVENTS: Patient seen and examined at bedside with no new complaints - AAO x 1, responding to questions but unable to assess answers, no distress    MEDICATIONS  (STANDING):  dextrose 5% with potassium chloride 20 mEq/L 1000 milliLiter(s) (75 mL/Hr) IV Continuous <Continuous>  digoxin     Tablet 0.125 milliGRAM(s) Oral daily  insulin lispro (HumaLOG) corrective regimen sliding scale   SubCutaneous three times a day with meals  lactobacillus acidophilus 1 Tablet(s) Oral every 8 hours  lisinopril 2.5 milliGRAM(s) Oral daily  metoprolol succinate ER 25 milliGRAM(s) Oral daily  nystatin Powder 1 Application(s) Topical two times a day  pantoprazole    Tablet 40 milliGRAM(s) Oral before breakfast    MEDICATIONS  (PRN):      __________________________________________________  REVIEW OF SYSTEMS: Limited 2/2 non comprehensible      Vital Signs Last 24 Hrs  T(C): 36.6 (08 Feb 2018 05:06), Max: 36.7 (07 Feb 2018 21:11)  T(F): 97.9 (08 Feb 2018 05:06), Max: 98 (07 Feb 2018 21:11)  HR: 93 (08 Feb 2018 05:06) (68 - 93)  BP: 144/73 (08 Feb 2018 05:06) (120/61 - 144/73)  BP(mean): --  RR: 18 (08 Feb 2018 05:06) (18 - 18)  SpO2: 98% (08 Feb 2018 05:06) (94% - 98%)    ________________________________________________  PHYSICAL EXAM:  GENERAL: NAD  HEENT: Normocephalic;  conjunctivae and sclerae clear; moist mucous membranes;   NECK : supple  CHEST/LUNG: Clear to auscultation bilaterally with good air entry   HEART: S1 S2  regular; no murmurs, gallops or rubs  ABDOMEN: Soft, Nontender, Nondistended; Bowel sounds present  EXTREMITIES: No cyanosis; no edema; no calf tenderness  NERVOUS SYSTEM:  Awake and alert; Oriented person only    _________________________________________________  LABS:                        11.5   6.0   )-----------( 122      ( 08 Feb 2018 06:09 )             36.2     02-08    144  |  106  |  11  ----------------------------<  98  3.7   |  28  |  0.70    Ca    8.6      08 Feb 2018 06:09  Phos  3.0     02-08  Mg     1.8     02-08          CAPILLARY BLOOD GLUCOSE      POCT Blood Glucose.: 88 mg/dL (07 Feb 2018 22:00)  POCT Blood Glucose.: 91 mg/dL (07 Feb 2018 16:24)  POCT Blood Glucose.: 114 mg/dL (07 Feb 2018 11:59)  POCT Blood Glucose.: 102 mg/dL (07 Feb 2018 08:01)        RADIOLOGY & ADDITIONAL TESTS:    Imaging Personally Reviewed:  YES    Consultant(s) Notes Reviewed:   YES    Care Discussed with Consultants : YES    Plan of care was discussed with patient and /or primary care giver; all questions and concerns were addressed and care was aligned with patient's wishes.

## 2018-02-08 NOTE — PHYSICAL THERAPY INITIAL EVALUATION ADULT - GENERAL OBSERVATIONS, REHAB EVAL
Pt received supine in bed turned to the left 30 degrees eyes open and alert in NAD (+) nasal canula 2L, (+) zavala, (+) central line
Pt received supine in bed in NAD

## 2018-02-08 NOTE — PHYSICAL THERAPY INITIAL EVALUATION ADULT - CRITERIA FOR SKILLED THERAPEUTIC INTERVENTIONS
impairments found/functional limitations in following categories/risk reduction/prevention/rehab potential
functional limitations in following categories/rehab potential/impairments found

## 2018-02-08 NOTE — PROGRESS NOTE ADULT - SUBJECTIVE AND OBJECTIVE BOX
pt seen and examined, no complaints on exam.  no chest pain or palpitation on exam   NAD noted.    dextrose 5% with potassium chloride 20 mEq/L 1000 milliLiter(s) IV Continuous <Continuous>  digoxin     Tablet 0.125 milliGRAM(s) Oral daily  insulin lispro (HumaLOG) corrective regimen sliding scale   SubCutaneous three times a day with meals  lactobacillus acidophilus 1 Tablet(s) Oral every 8 hours  lisinopril 2.5 milliGRAM(s) Oral daily  metoprolol succinate ER 25 milliGRAM(s) Oral daily  nystatin Powder 1 Application(s) Topical two times a day  pantoprazole    Tablet 40 milliGRAM(s) Oral before breakfast                            11.6   5.6   )-----------( 103      ( 07 Feb 2018 07:49 )             36.3       Hemoglobin: 11.6 g/dL (02-07 @ 07:49)  Hemoglobin: 10.5 g/dL (02-06 @ 07:52)  Hemoglobin: 11.0 g/dL (02-05 @ 06:57)  Hemoglobin: 11.1 g/dL (02-04 @ 07:13)  Hemoglobin: 10.8 g/dL (02-03 @ 07:16)      02-07    143  |  107  |  10  ----------------------------<  108<H>  3.7   |  25  |  0.78    Ca    8.5      07 Feb 2018 07:49  Phos  2.8     02-07  Mg     1.7     02-07      Creatinine Trend: 0.78<--, 0.73<--, 0.85<--, 0.89<--, 0.78<--, 0.97<--    COAGS:           T(C): 36.7 (02-07-18 @ 21:11), Max: 36.9 (02-07-18 @ 05:09)  HR: 87 (02-07-18 @ 21:11) (68 - 94)  BP: 133/94 (02-07-18 @ 21:11) (120/61 - 150/70)  RR: 18 (02-07-18 @ 21:11) (18 - 18)  SpO2: 97% (02-07-18 @ 21:11) (94% - 99%)  Wt(kg): --    I&O's Summary    HEENT:   Normal oral mucosa,   Lymphatic: No obvious lymphadenopathy , no edema  Cardiovascular: Normal S1 S2, No JVD, 1/6 PEDRO murmur, Peripheral pulses palpable 2+ bilaterally  Respiratory: Coarse mechanical BS, normal effort 	  Gastrointestinal:  Soft, Non-tender, + BS	  Skin: No rashes,  No cyanosis, warm to touch  Musculoskeletal: unable to fully assess  Psychiatry:  Confused      ECHO: < from: Transthoracic Echocardiogram (01.17.18 @ 14:22) >  CONCLUSIONS:  1. Normal mitral valve. Mild to moderate mitral  regurgitation.  2. Aortic valve not seen well.  3. Aortic Root: 3.1 cm.  4. Mild left atrial enlargement.  5. Normal left ventricular internal dimensions and wall  thicknesses.  6. Endocardium not well visualized; grossly severely  reduced l left ventricular systolic function.  7. Grade II diastolic dysfunction.  8. Normal right atrium.  9. Normal right ventricular size and function.  10. RA Pressure is 8 mm Hg.  11. RV systolic pressure is 33 mm Hg.  12. Normal tricuspid valve.  13. Normal pulmonic valve.  14. Normal pericardium with no pericardial effusion.    < end of copied text >      ASSESSMENT/PLAN: 	89y Male ? cirrhosis found down by his superintendant,  admitted with respiratory failure new rapid afib, shock, found with severe LV dyfx  of unknown duration.    cont rate control with Dig , BB-  cont ACE - BP  stable    GI / DVT prophylaxis.  monitor off Diuretics    keep K>4, mag >2.0  GI follow up - no a/c at present - would like to start ASA/ Plavix instead due to high risk , with  afib.   if GI deems DAPT to risky then would use ASA alone.   fall precaution / Aspiration precaution    D/W Dr Logan Patient appears comfortable, confused  .    dextrose 5% with potassium chloride 20 mEq/L 1000 milliLiter(s) IV Continuous <Continuous>  digoxin     Tablet 0.125 milliGRAM(s) Oral daily  insulin lispro (HumaLOG) corrective regimen sliding scale   SubCutaneous three times a day with meals  lactobacillus acidophilus 1 Tablet(s) Oral every 8 hours  lisinopril 2.5 milliGRAM(s) Oral daily  metoprolol succinate ER 25 milliGRAM(s) Oral daily  nystatin Powder 1 Application(s) Topical two times a day  pantoprazole    Tablet 40 milliGRAM(s) Oral before breakfast                            11.6   5.6   )-----------( 103      ( 07 Feb 2018 07:49 )             36.3       Hemoglobin: 11.6 g/dL (02-07 @ 07:49)  Hemoglobin: 10.5 g/dL (02-06 @ 07:52)  Hemoglobin: 11.0 g/dL (02-05 @ 06:57)  Hemoglobin: 11.1 g/dL (02-04 @ 07:13)  Hemoglobin: 10.8 g/dL (02-03 @ 07:16)      02-07    143  |  107  |  10  ----------------------------<  108<H>  3.7   |  25  |  0.78    Ca    8.5      07 Feb 2018 07:49  Phos  2.8     02-07  Mg     1.7     02-07      Creatinine Trend: 0.78<--, 0.73<--, 0.85<--, 0.89<--, 0.78<--, 0.97<--    COAGS:           T(C): 36.7 (02-07-18 @ 21:11), Max: 36.9 (02-07-18 @ 05:09)  HR: 87 (02-07-18 @ 21:11) (68 - 94)  BP: 133/94 (02-07-18 @ 21:11) (120/61 - 150/70)  RR: 18 (02-07-18 @ 21:11) (18 - 18)  SpO2: 97% (02-07-18 @ 21:11) (94% - 99%)  Wt(kg): --    I&O's Summary    HEENT:   Normal oral mucosa,   Lymphatic: No obvious lymphadenopathy , no edema  Cardiovascular: Normal S1 S2, No JVD, 1/6 PEDRO murmur, Peripheral pulses palpable 2+ bilaterally  Respiratory: Coarse mechanical BS, normal effort 	  Gastrointestinal:  Soft, Non-tender, + BS	  Skin: No rashes,  No cyanosis, warm to touch  Musculoskeletal: unable to fully assess  Psychiatry:  Confused      ECHO: < from: Transthoracic Echocardiogram (01.17.18 @ 14:22) >  CONCLUSIONS:  1. Normal mitral valve. Mild to moderate mitral  regurgitation.  2. Aortic valve not seen well.  3. Aortic Root: 3.1 cm.  4. Mild left atrial enlargement.  5. Normal left ventricular internal dimensions and wall  thicknesses.  6. Endocardium not well visualized; grossly severely  reduced l left ventricular systolic function.  7. Grade II diastolic dysfunction.  8. Normal right atrium.  9. Normal right ventricular size and function.  10. RA Pressure is 8 mm Hg.  11. RV systolic pressure is 33 mm Hg.  12. Normal tricuspid valve.  13. Normal pulmonic valve.  14. Normal pericardium with no pericardial effusion.    < end of copied text >      ASSESSMENT/PLAN: 	89y Male ? cirrhosis found down by his superintendant,  admitted with respiratory failure new rapid afib, shock, found with severe LV dyfx  of unknown duration.    cont rate control with Dig , BB-  cont ACE - BP  stable    GI / DVT prophylaxis.  monitor off Diuretics    keep K>4, mag >2.0  GI follow up - no a/c at present - would like to start ASA/ Plavix instead due to high risk , with  afib.   if GI deems DAPT to risky then would use ASA alone.   fall precaution / Aspiration precaution    D/W Dr Logan

## 2018-02-08 NOTE — PROGRESS NOTE ADULT - PROBLEM SELECTOR PLAN 6
BMP wnls; resolved w/ trial of Lactulose; AAOx2 person and place  - Filipino speaking, independently living at home prior to admission

## 2018-02-08 NOTE — PHYSICAL THERAPY INITIAL EVALUATION ADULT - ACTIVE RANGE OF MOTION EXAMINATION, REHAB EVAL
no Active ROM deficits were identified
bilateral upper extremity Active ROM was WFL (within functional limits)/bilateral  lower extremity Active ROM was WFL (within functional limits)

## 2018-02-08 NOTE — PHYSICAL THERAPY INITIAL EVALUATION ADULT - PERTINENT HX OF CURRENT PROBLEM, REHAB EVAL
Pt is an 85 yo M admitted to hospital with respiratory failure and shock
Pt received for respiratory failure and shock

## 2018-02-09 NOTE — PROGRESS NOTE ADULT - PROBLEM SELECTOR PLAN 5
***Resolved; 378/113 at admission, now normal.  - US abdomen supports cirrhosis w/ MELD score 15  - Patient to f/u w/ [facility] as outpatient in 2 weeks as per GI

## 2018-02-09 NOTE — PROGRESS NOTE ADULT - PROBLEM SELECTOR PLAN 1
1 episode of fever; none since then  - Infectious w/u negative  ***C/w monitoring 1 episode of fever days ago; none since then  - Infectious w/u negative  ***C/w monitoring

## 2018-02-09 NOTE — PROGRESS NOTE ADULT - PROBLEM SELECTOR PLAN 6
BMP wnls; resolved w/ trial of Lactulose; AAOx2 person and place  - Finnish speaking, independently living at home prior to admission

## 2018-02-09 NOTE — PROGRESS NOTE ADULT - SUBJECTIVE AND OBJECTIVE BOX
Patient appears comfortable, confused  .  dextrose 5% with potassium chloride 20 mEq/L 1000 milliLiter(s) IV Continuous <Continuous>  digoxin     Tablet 0.125 milliGRAM(s) Oral daily  insulin lispro (HumaLOG) corrective regimen sliding scale   SubCutaneous three times a day with meals  lactobacillus acidophilus 1 Tablet(s) Oral every 8 hours  lisinopril 2.5 milliGRAM(s) Oral daily  metoprolol succinate ER 25 milliGRAM(s) Oral daily  nystatin Powder 1 Application(s) Topical two times a day  pantoprazole    Tablet 40 milliGRAM(s) Oral before breakfast                            11.5   6.0   )-----------( 122      ( 08 Feb 2018 06:09 )             36.2       Hemoglobin: 11.5 g/dL (02-08 @ 06:09)  Hemoglobin: 11.6 g/dL (02-07 @ 07:49)  Hemoglobin: 10.5 g/dL (02-06 @ 07:52)  Hemoglobin: 11.0 g/dL (02-05 @ 06:57)      02-08    144  |  106  |  11  ----------------------------<  98  3.7   |  28  |  0.70    Ca    8.6      08 Feb 2018 06:09  Phos  3.0     02-08  Mg     1.8     02-08      Creatinine Trend: 0.70<--, 0.78<--, 0.73<--, 0.85<--, 0.89<--, 0.78<--    COAGS:           T(C): 36.9 (02-09-18 @ 14:00), Max: 36.9 (02-09-18 @ 14:00)  HR: 60 (02-09-18 @ 14:00) (60 - 90)  BP: 115/60 (02-09-18 @ 14:00) (115/60 - 142/69)  RR: 16 (02-09-18 @ 14:00) (16 - 18)  SpO2: 98% (02-09-18 @ 14:00) (97% - 100%)  Wt(kg): --    I&O's Summary      HEENT:   Normal oral mucosa,   Lymphatic: No obvious lymphadenopathy , no edema  Cardiovascular: Normal S1 S2, No JVD, 1/6 PEDRO murmur, Peripheral pulses palpable 2+ bilaterally  Respiratory: Coarse mechanical BS, normal effort 	  Gastrointestinal:  Soft, Non-tender, + BS	  Skin: No rashes,  No cyanosis, warm to touch  Musculoskeletal: unable to fully assess  Psychiatry:  Confused      ECHO: < from: Transthoracic Echocardiogram (01.17.18 @ 14:22) >  CONCLUSIONS:  1. Normal mitral valve. Mild to moderate mitral  regurgitation.  2. Aortic valve not seen well.  3. Aortic Root: 3.1 cm.  4. Mild left atrial enlargement.  5. Normal left ventricular internal dimensions and wall  thicknesses.  6. Endocardium not well visualized; grossly severely  reduced l left ventricular systolic function.  7. Grade II diastolic dysfunction.  8. Normal right atrium.  9. Normal right ventricular size and function.  10. RA Pressure is 8 mm Hg.  11. RV systolic pressure is 33 mm Hg.  12. Normal tricuspid valve.  13. Normal pulmonic valve.  14. Normal pericardium with no pericardial effusion.    < end of copied text >      ASSESSMENT/PLAN: 	89y Male ? cirrhosis found down by his superintendant,  admitted with respiratory failure new rapid afib, shock, found with severe LV dyfx  of unknown duration.    cont rate control with Dig , BB-  cont ACE - BP  stable   - well compensated from CHF prospective  - OFF ac due to GI bleed  - consider ASA if ok with GI    Donald Logan MD, FACC  [facility], [facility]  [address] [place] [address]  PHONE:  [phone]  BEEPER : [phone]

## 2018-02-09 NOTE — PROGRESS NOTE ADULT - ATTENDING COMMENTS
Patient was seen and examined by myself. Case was discussed with house staff in details. I have reviewed and agree with the plan as outlined above with edits where appropriate.  83 y/o M with   1. bleeding per rectum- resolved; hb stable.  2. AFIB - rate controlled; no anticoagulation for now due to recent GI bleed presumably from ischemic colitis.  3. liver cirrhosis - stable; LFTs improved  4. Dysphagia- stable; currently on dysphagia diet as tolerated; maintain aspiration precautions.  5. Acute renal failure - resolving  6. Diabetes-  stable; continue monitoring  7. Hypernatremia- resolving  8. Metabolic encephalopathy- resolving; more alert today; follows commands  9. Ischemic colitis- Resolved abdominal pain. Has had normal soft bowel movement recently  10 Fever- resolved after one isolated episode- cultures were negative.  11. Chronic systolic and diastolic heart failure- monitor; no sign of decompensation.    DISPO- awaiting rehab or long term placement.

## 2018-02-09 NOTE — PROGRESS NOTE ADULT - SUBJECTIVE AND OBJECTIVE BOX
PGY 1 Note discussed with supervising resident and primary attending    Patient is a 84y old  Male who presents with a chief complaint of acute hypoxic respiratory failure and metabolic encephalopathy (23 Jan 2018 15:10)      INTERVAL HPI/OVERNIGHT EVENTS: Patient seen and examined at bedside with no new complaints    MEDICATIONS  (STANDING):  dextrose 5% with potassium chloride 20 mEq/L 1000 milliLiter(s) (75 mL/Hr) IV Continuous <Continuous>  digoxin     Tablet 0.125 milliGRAM(s) Oral daily  insulin lispro (HumaLOG) corrective regimen sliding scale   SubCutaneous three times a day with meals  lactobacillus acidophilus 1 Tablet(s) Oral every 8 hours  lisinopril 2.5 milliGRAM(s) Oral daily  metoprolol succinate ER 25 milliGRAM(s) Oral daily  nystatin Powder 1 Application(s) Topical two times a day  pantoprazole    Tablet 40 milliGRAM(s) Oral before breakfast    MEDICATIONS  (PRN):      __________________________________________________  REVIEW OF SYSTEMS:    CONSTITUTIONAL: No fever,   EYES: No acute visual disturbances  NECK: No pain or stiffness  RESPIRATORY: No cough; No shortness of breath  CARDIOVASCULAR: No chest pain, no palpitations  GASTROINTESTINAL: No pain. No nausea or vomiting; No diarrhea   NEUROLOGICAL: No headache or numbness, no tremors  MUSCULOSKELETAL: No joint pain, no muscle pain  GENITOURINARY: No dysuria, no frequency, no hesitancy  PSYCHIATRY: No depression , no anxiety  ALL OTHER  ROS negative        Vital Signs Last 24 Hrs  T(C): 36.6 (09 Feb 2018 05:01), Max: 36.6 (09 Feb 2018 05:01)  T(F): 97.8 (09 Feb 2018 05:01), Max: 97.8 (09 Feb 2018 05:01)  HR: 74 (09 Feb 2018 05:01) (74 - 90)  BP: 135/82 (09 Feb 2018 05:01) (118/73 - 142/69)  BP(mean): --  RR: 16 (09 Feb 2018 05:01) (16 - 18)  SpO2: 100% (09 Feb 2018 05:01) (97% - 100%)    ________________________________________________  PHYSICAL EXAM:  GENERAL: NAD, joyful  HEENT: Normocephalic;  conjunctivae and sclerae clear; moist mucous membranes;   NECK : supple  CHEST/LUNG: Clear to auscultation bilaterally with good air entry   HEART: S1 S2  regular; no murmurs, gallops or rubs  ABDOMEN: Soft, Nontender, Nondistended; Bowel sounds present  EXTREMITIES: No cyanosis; no edema; no calf tenderness  NERVOUS SYSTEM:  Awake and alert; Oriented to person only    _________________________________________________  LABS:                        11.5   6.0   )-----------( 122      ( 08 Feb 2018 06:09 )             36.2     02-08    144  |  106  |  11  ----------------------------<  98  3.7   |  28  |  0.70    Ca    8.6      08 Feb 2018 06:09  Phos  3.0     02-08  Mg     1.8     02-08          CAPILLARY BLOOD GLUCOSE      POCT Blood Glucose.: 101 mg/dL (08 Feb 2018 21:41)  POCT Blood Glucose.: 116 mg/dL (08 Feb 2018 16:37)  POCT Blood Glucose.: 106 mg/dL (08 Feb 2018 11:33)  POCT Blood Glucose.: 123 mg/dL (08 Feb 2018 08:15)        RADIOLOGY & ADDITIONAL TESTS:    Imaging Personally Reviewed:  YES    Consultant(s) Notes Reviewed:   YES    Care Discussed with Consultants : YES    Plan of care was discussed with patient and /or primary care giver; all questions and concerns were addressed and care was aligned with patient's wishes. PGY 1 Note discussed with supervising resident and primary attending    Patient is a 84y old  Male who presents with a chief complaint of acute hypoxic respiratory failure and metabolic encephalopathy (23 Jan 2018 15:10)      INTERVAL HPI/OVERNIGHT EVENTS: Patient seen and examined at bedside with no new complaints - plan to draw labs as indicated, patient stable and pending discharge    MEDICATIONS  (STANDING):  dextrose 5% with potassium chloride 20 mEq/L 1000 milliLiter(s) (75 mL/Hr) IV Continuous <Continuous>  digoxin     Tablet 0.125 milliGRAM(s) Oral daily  insulin lispro (HumaLOG) corrective regimen sliding scale   SubCutaneous three times a day with meals  lactobacillus acidophilus 1 Tablet(s) Oral every 8 hours  lisinopril 2.5 milliGRAM(s) Oral daily  metoprolol succinate ER 25 milliGRAM(s) Oral daily  nystatin Powder 1 Application(s) Topical two times a day  pantoprazole    Tablet 40 milliGRAM(s) Oral before breakfast    MEDICATIONS  (PRN):      __________________________________________________  REVIEW OF SYSTEMS: Limited  RESPIRATORY: No cough; No shortness of breath  CARDIOVASCULAR: No chest pain, no palpitations  GASTROINTESTINAL: No pain. No nausea or vomiting; No diarrhea     Vital Signs Last 24 Hrs  T(C): 36.6 (09 Feb 2018 05:01), Max: 36.6 (09 Feb 2018 05:01)  T(F): 97.8 (09 Feb 2018 05:01), Max: 97.8 (09 Feb 2018 05:01)  HR: 74 (09 Feb 2018 05:01) (74 - 90)  BP: 135/82 (09 Feb 2018 05:01) (118/73 - 142/69)  BP(mean): --  RR: 16 (09 Feb 2018 05:01) (16 - 18)  SpO2: 100% (09 Feb 2018 05:01) (97% - 100%)    ________________________________________________  PHYSICAL EXAM:  GENERAL: NAD, joyful  HEENT: Normocephalic;  conjunctivae and sclerae clear; moist mucous membranes;   NECK : supple  CHEST/LUNG: Clear to auscultation bilaterally with good air entry   HEART: S1 S2  regular; no murmurs, gallops or rubs  ABDOMEN: Soft, Nontender, Nondistended; Bowel sounds present  EXTREMITIES: No cyanosis; no edema; no calf tenderness  NERVOUS SYSTEM:  Awake and alert; Oriented to person only    _________________________________________________  LABS:                        11.5   6.0   )-----------( 122      ( 08 Feb 2018 06:09 )             36.2     02-08    144  |  106  |  11  ----------------------------<  98  3.7   |  28  |  0.70    Ca    8.6      08 Feb 2018 06:09  Phos  3.0     02-08  Mg     1.8     02-08          CAPILLARY BLOOD GLUCOSE      POCT Blood Glucose.: 101 mg/dL (08 Feb 2018 21:41)  POCT Blood Glucose.: 116 mg/dL (08 Feb 2018 16:37)  POCT Blood Glucose.: 106 mg/dL (08 Feb 2018 11:33)  POCT Blood Glucose.: 123 mg/dL (08 Feb 2018 08:15)        RADIOLOGY & ADDITIONAL TESTS:    Imaging Personally Reviewed:  YES    Consultant(s) Notes Reviewed:   YES    Care Discussed with Consultants : YES    Plan of care was discussed with patient and /or primary care giver; all questions and concerns were addressed and care was aligned with patient's wishes.

## 2018-02-10 NOTE — PROGRESS NOTE ADULT - PROBLEM SELECTOR PLAN 4
Resolved.   ***Right renal complex cyst noted, recommend repeat renal US in 3 months last LFTs 2/5/2018 showed normalization  -outpt follow-up at [facility] liver transplant center

## 2018-02-10 NOTE — PROGRESS NOTE ADULT - PROBLEM SELECTOR PLAN 6
BMP wnls; resolved w/ trial of Lactulose; AAOx2 person and place  - [place] Island[place] speaking, independently living at home prior to admission Psychiatry consulted to evaluate mental status for patient who reportedly was living independent and managing ADL  - No acute medical illness, w/u currently unremarkable  - CT head negative x 2

## 2018-02-10 NOTE — PROGRESS NOTE ADULT - PROBLEM SELECTOR PLAN 3
Psychiatry consulted to evaluate mental status for patient who reportedly was living independent and managing ADL  - No acute medical illness, w/u currently unremarkable  - CT head negative x 2 appropriately rate controlled on  Digoxin + Lopressor 25 mg BID PO   -no AC due to bleeding risk as well as active bleed during hospitalization  -appreciate cardiology input/assessment

## 2018-02-10 NOTE — PROGRESS NOTE ADULT - PROBLEM SELECTOR PLAN 2
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 Puckett's input; no AC for now  Cardiology Dr. Logan resolved, no evidence of acute infectious process  culture data negative thus luisa  -will continue to monitor during hosptial course

## 2018-02-10 NOTE — PROGRESS NOTE ADULT - PROBLEM SELECTOR PLAN 1
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

## 2018-02-10 NOTE — PROGRESS NOTE ADULT - PROBLEM SELECTOR PLAN 7
Discontinued Lantus 2/2 poor PO intake, Glucose wnls and Hb A1C 7.5%  - C/w ISS incidentally noted--> will require outpt follow-up

## 2018-02-10 NOTE — PROGRESS NOTE ADULT - ATTENDING COMMENTS
Patient seen and examined, agree with above assessment and plan as transcribed above.    - Cont medical management of cardiomyopathy    Donald Logan MD, FACC  [facility], [facility]  [address] [place] [address]  PHONE:  [phone]  BEEPER : [phone]

## 2018-02-10 NOTE — PROGRESS NOTE ADULT - SUBJECTIVE AND OBJECTIVE BOX
pt seen and examined, no complaints on exam.  agitated at times,. no events overnight    dextrose 5% with potassium chloride 20 mEq/L 1000 milliLiter(s) IV Continuous <Continuous>  digoxin     Tablet 0.125 milliGRAM(s) Oral daily  insulin lispro (HumaLOG) corrective regimen sliding scale   SubCutaneous three times a day with meals  lactobacillus acidophilus 1 Tablet(s) Oral every 8 hours  lisinopril 2.5 milliGRAM(s) Oral daily  metoprolol succinate ER 25 milliGRAM(s) Oral daily  nystatin Powder 1 Application(s) Topical two times a day  pantoprazole    Tablet 40 milliGRAM(s) Oral before breakfast                            11.9   5.7   )-----------( 140      ( 10 Feb 2018 06:48 )             38.4       Hemoglobin: 11.9 g/dL (02-10 @ 06:48)  Hemoglobin: 11.5 g/dL (02-08 @ 06:09)  Hemoglobin: 11.6 g/dL (02-07 @ 07:49)  Hemoglobin: 10.5 g/dL (02-06 @ 07:52)      02-10    145  |  108  |  14  ----------------------------<  131<H>  3.4<L>   |  26  |  0.75    Ca    8.8      10 Feb 2018 06:48  Phos  2.8     02-10  Mg     2.0     02-10      Creatinine Trend: 0.75<--, 0.70<--, 0.78<--, 0.73<--, 0.85<--, 0.89<--    COAGS:           T(C): 37.2 (02-10-18 @ 05:16), Max: 37.2 (02-10-18 @ 05:16)  HR: 95 (02-10-18 @ 05:16) (60 - 95)  BP: 137/78 (02-10-18 @ 05:16) (115/60 - 141/84)  RR: 18 (02-10-18 @ 05:16) (16 - 18)  SpO2: 96% (02-10-18 @ 05:16) (95% - 100%)  Wt(kg): --    I&O's Summary      HEENT:   Normal oral mucosa,   Lymphatic: No obvious lymphadenopathy , no edema  Cardiovascular: Normal S1 S2, No JVD, 1/6 PEDRO murmur, Peripheral pulses palpable 2+ bilaterally  Respiratory: Coarse mechanical BS, normal effort 	  Gastrointestinal:  Soft, Non-tender, + BS	  Skin: No rashes,  No cyanosis, warm to touch  Musculoskeletal: unable to fully assess  Psychiatry:  Confused      ECHO: < from: Transthoracic Echocardiogram (01.17.18 @ 14:22) >  CONCLUSIONS:  1. Normal mitral valve. Mild to moderate mitral  regurgitation.  2. Aortic valve not seen well.  3. Aortic Root: 3.1 cm.  4. Mild left atrial enlargement.  5. Normal left ventricular internal dimensions and wall  thicknesses.  6. Endocardium not well visualized; grossly severely  reduced l left ventricular systolic function.  7. Grade II diastolic dysfunction.  8. Normal right atrium.  9. Normal right ventricular size and function.  10. RA Pressure is 8 mm Hg.  11. RV systolic pressure is 33 mm Hg.  12. Normal tricuspid valve.  13. Normal pulmonic valve.  14. Normal pericardium with no pericardial effusion.    < end of copied text >      ASSESSMENT/PLAN: 	89y Male ? cirrhosis found down by his superintendant,  admitted with respiratory failure new rapid afib, shock, found with severe LV dyfx  of unknown duration.    cont rate control with Dig , BB-  cont ACE - BP  stable    GI / DVT prophylaxis.  monitor off Diuretics    keep K>4, mag >2.0  GI follow up - no a/c at present -  if GI deems DAPT to risky then would use ASA alone.   fall precaution / Aspiration precaution    D/W Dr Logan

## 2018-02-10 NOTE — PROGRESS NOTE ADULT - SUBJECTIVE AND OBJECTIVE BOX
Patient is a 84y old  Male who presents with a chief complaint of acute hypoxic respiratory failure and metabolic encephalopathy (23 Jan 2018 15:10)      INTERVAL HPI/OVERNIGHT EVENTS: no new complaints    MEDICATIONS  (STANDING):  dextrose 5% with potassium chloride 20 mEq/L 1000 milliLiter(s) (75 mL/Hr) IV Continuous <Continuous>  digoxin     Tablet 0.125 milliGRAM(s) Oral daily  insulin lispro (HumaLOG) corrective regimen sliding scale   SubCutaneous three times a day with meals  lactobacillus acidophilus 1 Tablet(s) Oral every 8 hours  lisinopril 2.5 milliGRAM(s) Oral daily  metoprolol succinate ER 25 milliGRAM(s) Oral daily  nystatin Powder 1 Application(s) Topical two times a day  pantoprazole    Tablet 40 milliGRAM(s) Oral before breakfast    MEDICATIONS  (PRN):      __________________________________________________  REVIEW OF SYSTEMS:    CONSTITUTIONAL: No fever,   EYES: no acute visual disturbances  NECK: No pain or stiffness  RESPIRATORY: No cough; No shortness of breath  CARDIOVASCULAR: No chest pain, no palpitations  GASTROINTESTINAL: No pain. No nausea or vomiting; No diarrhea   NEUROLOGICAL: No headache or numbness, no tremors  MUSCULOSKELETAL: No joint pain, no muscle pain  GENITOURINARY: no dysuria, no frequency, no hesitancy  PSYCHIATRY: no depression , no anxiety  ALL OTHER  ROS negative        Vital Signs Last 24 Hrs  T(C): 36.7 (10 Feb 2018 14:00), Max: 37.2 (10 Feb 2018 05:16)  T(F): 98 (10 Feb 2018 14:00), Max: 98.9 (10 Feb 2018 05:16)  HR: 102 (10 Feb 2018 14:00) (84 - 102)  BP: 127/54 (10 Feb 2018 14:00) (124/81 - 137/78)  BP(mean): --  RR: 16 (10 Feb 2018 14:00) (16 - 18)  SpO2: 95% (10 Feb 2018 14:00) (95% - 96%)    ________________________________________________  PHYSICAL EXAM:  GENERAL: NAD  HEENT: Normocephalic;  conjunctivae and sclerae clear; moist mucous membranes;   NECK : supple  CHEST/LUNG: Clear to auscultation bilaterally with good air entry   HEART: S1 S2  regular; no murmurs, gallops or rubs  ABDOMEN: Soft, Nontender, Nondistended; Bowel sounds present  EXTREMITIES: no cyanosis; no edema; no calf tenderness  SKIN: warm and dry; no rash  NERVOUS SYSTEM:  Awake and alert; Oriented  to place, person and time ; no new deficits    _________________________________________________  LABS:                        11.9   5.7   )-----------( 140      ( 10 Feb 2018 06:48 )             38.4     02-10    145  |  108  |  14  ----------------------------<  131<H>  3.4<L>   |  26  |  0.75    Ca    8.8      10 Feb 2018 06:48  Phos  2.8     02-10  Mg     2.0     02-10          CAPILLARY BLOOD GLUCOSE      POCT Blood Glucose.: 132 mg/dL (10 Feb 2018 12:08)  POCT Blood Glucose.: 126 mg/dL (10 Feb 2018 07:46)  POCT Blood Glucose.: 123 mg/dL (09 Feb 2018 21:08)  POCT Blood Glucose.: 109 mg/dL (09 Feb 2018 16:52)        RADIOLOGY & ADDITIONAL TESTS:    Imaging Personally Reviewed:  YES/NO    Consultant(s) Notes Reviewed:   YES/ No    Care Discussed with Consultants :     Plan of care was discussed with patient and /or primary care giver; all questions and concerns were addressed and care was aligned with patient's wishes. Patient is a 84y old  Male who presents with a chief complaint of acute hypoxic respiratory failure and metabolic encephalopathy (23 Jan 2018 15:10)      INTERVAL HPI/OVERNIGHT EVENTS: no new complaints. no fevers/chills/ nausea/vomitng/fevers/chills/LH  abdominal pain    MEDICATIONS  (STANDING):  dextrose 5% with potassium chloride 20 mEq/L 1000 milliLiter(s) (75 mL/Hr) IV Continuous <Continuous>  digoxin     Tablet 0.125 milliGRAM(s) Oral daily  insulin lispro (HumaLOG) corrective regimen sliding scale   SubCutaneous three times a day with meals  lactobacillus acidophilus 1 Tablet(s) Oral every 8 hours  lisinopril 2.5 milliGRAM(s) Oral daily  metoprolol succinate ER 25 milliGRAM(s) Oral daily  nystatin Powder 1 Application(s) Topical two times a day  pantoprazole    Tablet 40 milliGRAM(s) Oral before breakfast    MEDICATIONS  (PRN):      __________________________________________________  REVIEW OF SYSTEMS:    CONSTITUTIONAL: No fever,   EYES: no acute visual disturbances  NECK: No pain or stiffness  RESPIRATORY: No cough; No shortness of breath  CARDIOVASCULAR: No chest pain, no palpitations  GASTROINTESTINAL: No pain. No nausea or vomiting; No diarrhea   NEUROLOGICAL: No headache or numbness, no tremors  MUSCULOSKELETAL: No joint pain, no muscle pain  GENITOURINARY: no dysuria, no frequency, no hesitancy  PSYCHIATRY: no depression , no anxiety  ALL OTHER  ROS negative        Vital Signs Last 24 Hrs  T(C): 36.7 (10 Feb 2018 14:00), Max: 37.2 (10 Feb 2018 05:16)  T(F): 98 (10 Feb 2018 14:00), Max: 98.9 (10 Feb 2018 05:16)  HR: 102 (10 Feb 2018 14:00) (84 - 102)  BP: 127/54 (10 Feb 2018 14:00) (124/81 - 137/78)  BP(mean): --  RR: 16 (10 Feb 2018 14:00) (16 - 18)  SpO2: 95% (10 Feb 2018 14:00) (95% - 96%)    ________________________________________________  PHYSICAL EXAM:  GENERAL: NAD  HEENT: Normocephalic;  conjunctivae and sclerae clear; moist mucous membranes;   NECK : supple  CHEST/LUNG: Clear to auscultation bilaterally with good air entry   HEART: S1 S2  irreg/irreg; no murmurs, gallops or rubs  ABDOMEN: Soft, Nontender, Nondistended; Bowel sounds present  EXTREMITIES: no cyanosis; no edema; no calf tenderness  SKIN: warm and dry; no rash  NERVOUS SYSTEM:  Awake and alert; Oriented  to place, person and time ; no new deficits    _________________________________________________  LABS:                        11.9   5.7   )-----------( 140      ( 10 Feb 2018 06:48 )             38.4     02-10    145  |  108  |  14  ----------------------------<  131<H>  3.4<L>   |  26  |  0.75    Ca    8.8      10 Feb 2018 06:48  Phos  2.8     02-10  Mg     2.0     02-10          CAPILLARY BLOOD GLUCOSE      POCT Blood Glucose.: 132 mg/dL (10 Feb 2018 12:08)  POCT Blood Glucose.: 126 mg/dL (10 Feb 2018 07:46)  POCT Blood Glucose.: 123 mg/dL (09 Feb 2018 21:08)  POCT Blood Glucose.: 109 mg/dL (09 Feb 2018 16:52)        RADIOLOGY & ADDITIONAL TESTS:    Imaging Personally Reviewed:  YES/NO    Consultant(s) Notes Reviewed:   YES/ No    Care Discussed with Consultants :     Plan of care was discussed with patient and /or primary care giver; all questions and concerns were addressed and care was aligned with patient's wishes.

## 2018-02-10 NOTE — PROGRESS NOTE ADULT - PROBLEM SELECTOR PLAN 5
***Resolved; 378/113 at admission, now normal.  - US abdomen supports cirrhosis w/ MELD score 15  - Patient to f/u w/ [facility] center as outpatient in 2 weeks as per GI mental status had improved with trial of lactulose, now off  reportedly was  independently living at home prior to admission

## 2018-02-11 NOTE — PROGRESS NOTE ADULT - SUBJECTIVE AND OBJECTIVE BOX
pt seen and examined, no complaints on exam.  no events overnight     dextrose 5% with potassium chloride 20 mEq/L 1000 milliLiter(s) IV Continuous <Continuous>  digoxin     Tablet 0.125 milliGRAM(s) Oral daily  insulin lispro (HumaLOG) corrective regimen sliding scale   SubCutaneous three times a day with meals  lactobacillus acidophilus 1 Tablet(s) Oral every 8 hours  lisinopril 2.5 milliGRAM(s) Oral daily  metoprolol succinate ER 25 milliGRAM(s) Oral daily  nystatin Powder 1 Application(s) Topical two times a day  pantoprazole    Tablet 40 milliGRAM(s) Oral before breakfast                            11.9   5.7   )-----------( 140      ( 10 Feb 2018 06:48 )             38.4       Hemoglobin: 11.9 g/dL (02-10 @ 06:48)  Hemoglobin: 11.5 g/dL (02-08 @ 06:09)  Hemoglobin: 11.6 g/dL (02-07 @ 07:49)      02-10    145  |  108  |  14  ----------------------------<  131<H>  3.4<L>   |  26  |  0.75    Ca    8.8      10 Feb 2018 06:48  Phos  2.8     02-10  Mg     2.0     02-10      Creatinine Trend: 0.75<--, 0.70<--, 0.78<--, 0.73<--, 0.85<--, 0.89<--    COAGS:           T(C): 37.2 (02-11-18 @ 05:14), Max: 37.6 (02-10-18 @ 21:06)  HR: 96 (02-11-18 @ 05:14) (94 - 102)  BP: 129/66 (02-11-18 @ 05:14) (95/75 - 129/66)  RR: 18 (02-11-18 @ 05:14) (16 - 18)  SpO2: 96% (02-11-18 @ 05:14) (95% - 96%)  Wt(kg): --    I&O's Summary    10 Feb 2018 07:01  -  11 Feb 2018 07:00  --------------------------------------------------------  IN: 0 mL / OUT: 0 mL / NET: 0 mL          HEENT:   Normal oral mucosa,   Lymphatic: No obvious lymphadenopathy , no edema  Cardiovascular: Normal S1 S2, No JVD, 1/6 PEDRO murmur, Peripheral pulses palpable 2+ bilaterally  Respiratory: Coarse mechanical BS, normal effort 	  Gastrointestinal:  Soft, Non-tender, + BS	  Skin: No rashes,  No cyanosis, warm to touch  Musculoskeletal: unable to fully assess  Psychiatry:  Confused      ECHO: < from: Transthoracic Echocardiogram (01.17.18 @ 14:22) >  CONCLUSIONS:  1. Normal mitral valve. Mild to moderate mitral  regurgitation.  2. Aortic valve not seen well.  3. Aortic Root: 3.1 cm.  4. Mild left atrial enlargement.  5. Normal left ventricular internal dimensions and wall  thicknesses.  6. Endocardium not well visualized; grossly severely  reduced l left ventricular systolic function.  7. Grade II diastolic dysfunction.  8. Normal right atrium.  9. Normal right ventricular size and function.  10. RA Pressure is 8 mm Hg.  11. RV systolic pressure is 33 mm Hg.  12. Normal tricuspid valve.  13. Normal pulmonic valve.  14. Normal pericardium with no pericardial effusion.    < end of copied text >      ASSESSMENT/PLAN: 	89y Male ? cirrhosis found down by his superintendant,  admitted with respiratory failure new rapid afib, shock, found with severe LV dyfx  of unknown duration.    cont rate control with Dig , BB- hr well controlled with present dosing   cont ACE - BP  stable    GI / DVT prophylaxis.  monitor off Diuretics    keep K>4, mag >2.0  GI follow up - no a/c at present -  if GI deems DAPT to risky then would use ASA alone.   fall precaution / Aspiration precaution    D/W Dr Logan

## 2018-02-11 NOTE — PROGRESS NOTE ADULT - ATTENDING COMMENTS
Patient seen and examined, agree with above assessment and plan as transcribed above.    - cont medical management    Donald Logan MD, [facility]  [facility], [facility]  [address] [place] [address]  PHONE:  [phone]  BEEPER : [phone]

## 2018-02-11 NOTE — PROGRESS NOTE ADULT - PROBLEM SELECTOR PLAN 5
***Resolved; 378/113 at admission, now normal.  - US abdomen supports cirrhosis w/ MELD score 15  - Patient to f/u w/ [facility]plant center as outpatient in 2 weeks as per GI

## 2018-02-11 NOTE — PROGRESS NOTE ADULT - SUBJECTIVE AND OBJECTIVE BOX
PGY 1 Note discussed with supervising resident and primary attending    Patient is a 84y old  Male who presents with a chief complaint of acute hypoxic respiratory failure and metabolic encephalopathy (23 Jan 2018 15:10)      INTERVAL HPI/OVERNIGHT EVENTS: Patient seen and examined at bedside with no new complaints - pending placement    MEDICATIONS  (STANDING):  dextrose 5% with potassium chloride 20 mEq/L 1000 milliLiter(s) (75 mL/Hr) IV Continuous <Continuous>  digoxin     Tablet 0.125 milliGRAM(s) Oral daily  insulin lispro (HumaLOG) corrective regimen sliding scale   SubCutaneous three times a day with meals  lactobacillus acidophilus 1 Tablet(s) Oral every 8 hours  lisinopril 2.5 milliGRAM(s) Oral daily  metoprolol succinate ER 25 milliGRAM(s) Oral daily  nystatin Powder 1 Application(s) Topical two times a day  pantoprazole    Tablet 40 milliGRAM(s) Oral before breakfast    MEDICATIONS  (PRN):      __________________________________________________  REVIEW OF SYSTEMS:    CONSTITUTIONAL: No fever,   EYES: No acute visual disturbances  NECK: No pain or stiffness  RESPIRATORY: No cough; No shortness of breath  CARDIOVASCULAR: No chest pain, no palpitations  GASTROINTESTINAL: No pain. No nausea or vomiting; No diarrhea   NEUROLOGICAL: No headache or numbness, no tremors  MUSCULOSKELETAL: No joint pain, no muscle pain  GENITOURINARY: No dysuria, no frequency, no hesitancy  PSYCHIATRY: No depression , no anxiety  ALL OTHER  ROS negative        Vital Signs Last 24 Hrs  T(C): 37.2 (11 Feb 2018 05:14), Max: 37.6 (10 Feb 2018 21:06)  T(F): 98.9 (11 Feb 2018 05:14), Max: 99.6 (10 Feb 2018 21:06)  HR: 96 (11 Feb 2018 05:14) (94 - 102)  BP: 129/66 (11 Feb 2018 05:14) (95/75 - 129/66)  BP(mean): --  RR: 18 (11 Feb 2018 05:14) (16 - 18)  SpO2: 96% (11 Feb 2018 05:14) (95% - 96%)    ________________________________________________  PHYSICAL EXAM:  GENERAL: NAD  HEENT: Normocephalic;  conjunctivae and sclerae clear; moist mucous membranes;   NECK : supple  CHEST/LUNG: Clear to auscultation bilaterally with good air entry   HEART: S1 S2  regular; no murmurs, gallops or rubs  ABDOMEN: Soft, Nontender, Nondistended; Bowel sounds present  EXTREMITIES: No cyanosis; no edema; no calf tenderness  NERVOUS SYSTEM:  Awake and alert to name    _________________________________________________  LABS:                        11.9   5.7   )-----------( 140      ( 10 Feb 2018 06:48 )             38.4     02-10    145  |  108  |  14  ----------------------------<  131<H>  3.4<L>   |  26  |  0.75    Ca    8.8      10 Feb 2018 06:48  Phos  2.8     02-10  Mg     2.0     02-10          CAPILLARY BLOOD GLUCOSE      POCT Blood Glucose.: 128 mg/dL (10 Feb 2018 21:19)  POCT Blood Glucose.: 120 mg/dL (10 Feb 2018 16:48)  POCT Blood Glucose.: 132 mg/dL (10 Feb 2018 12:08)  POCT Blood Glucose.: 126 mg/dL (10 Feb 2018 07:46)        RADIOLOGY & ADDITIONAL TESTS:    Imaging Personally Reviewed:  YES    Consultant(s) Notes Reviewed:   YES    Care Discussed with Consultants : YES    Plan of care was discussed with patient and /or primary care giver; all questions and concerns were addressed and care was aligned with patient's wishes.

## 2018-02-11 NOTE — PROGRESS NOTE ADULT - PROBLEM SELECTOR PLAN 2
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 Puckett's input; no AC for now 2/2 known colonic lesions  Cardiology Dr. Logan

## 2018-02-11 NOTE — PROGRESS NOTE ADULT - ATTENDING COMMENTS
Patient seen/evaluated at bedside 2/11/2018. I agree with the resident progress note/outlined plan of care. My independent findings and conclusions are documented.    83 y/o male with :    1. bright red blood per rectum (resolved) with stable Hgb/hct in setting of -->  2. 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 metabolic encephalopathy/ potentially hepatic: variable, will continue to monitor  would consider resumption of lactulose titrate to 2-3 soft bms, re-assess tomorrow  4. a fib: not a candidate for AC due to bleeding risks and recent active bleeds. currently rate controlled  5. DM T II: controlled, insulin sliding scale, diabetic diet  6. Chronic systolic and diastolic heart failure- continue with digoxin/metoprolol, monitor; appears euvolemic    DISPO- awaiting rehab or long term placement.

## 2018-02-11 NOTE — PROGRESS NOTE ADULT - PROBLEM SELECTOR PLAN 6
BMP wnls; resolved w/ trial of Lactulose; AAOx2 person and place  - Kitt[place] speaking, independently living at home prior to admission

## 2018-02-12 NOTE — PROGRESS NOTE ADULT - PROBLEM SELECTOR PROBLEM 8
Need for prophylactic measure
Diabetes
Need for prophylactic measure
Diabetes
Diabetes
Need for prophylactic measure
Diabetes
Hypokalemia
Need for prophylactic measure

## 2018-02-12 NOTE — CHART NOTE - NSCHARTNOTEFT_GEN_A_CORE
Paged by nurse, Patient has laboured breathing and RR of 41 O2 sat of 91% /56 and Temp of 102F. Patient seen at bedside, He is AX0=0, on Non rebreather venti mask with O2 sat of 91-93%, not responding to commands, On PE: He has agonal breathing with Accessory muscles use, Chest clear to auscultation, But harsh vesicular breathing. likely aspirated PNA or Influenza/RVP, Does not look fluid overloaded.    Called Patient's Relative Andrew () on phone, update him regarding his condition that patient is not doing well and desaturating, we are trying to do all measures we can do and to provide comfort care, he understood, He said he will try to cme tomorrow.    A/P:  -Stat labs including CXR, CBC, BMP, Lactate, Blood gases, Blood cultures and Urine cultures, RVP/influenza  -will start broad spectrum antibiotic cefepime given 1 dose today and will give half dose tomorrow due to creatinine clearance (confirmed with pharmacy.  -Will do CT chest tomorrow  -c/w non rebreather mask  -Maya catheter  -NPO until speech and swallow pending    Will continue to follow      Discussed with Attending and PGY3 Paged by nurse, Patient has laboured breathing and RR of 41 O2 sat of 91% /56 and Temp of 102F. Patient seen at bedside, He is AX0=0, on Non rebreather venti mask with O2 sat of 91-93%, not responding to commands, On PE: He has agonal breathing with Accessory muscles use, Chest clear to auscultation, But harsh vesicular breathing. likely aspirated PNA or Influenza/RVP, Does not look fluid overloaded.    Called Patient's Relative Andrew () on phone, update him regarding his condition that patient is not doing well and desaturating, we are trying to do all measures we can do and to provide comfort care, he understood, He said he will try to cme tomorrow.    A/P:  -Stat labs including CXR, CBC, BMP, Lactate, Blood gases, Blood cultures and Urine cultures, RVP/influenza, lactate  -will start broad spectrum antibiotic cefepime given 1 dose today and will give half dose tomorrow due to creatinine clearance (confirmed with pharmacy.  -Will do CT chest tomorrow  -c/w non rebreather mask  -Maya catheter  -NPO until speech and swallow recommendation  -Tyelnol suppository given  -Patient is DNR/DNI      Will continue to follow      Discussed with Attending and PGY3 Paged by nurse, Patient has laboured breathing and RR of 41 O2 sat of 91% /56 and Temp of 102F. Patient seen at bedside, He is AX0=0, on Non rebreather venti mask with O2 sat of 91-93%, not responding to commands, On PE: He has agonal breathing with Accessory muscles use, Chest clear to auscultation, But harsh vesicular breathing. likely aspirated PNA or Influenza/RVP, Does not look fluid overloaded.    Called Patient's Relative Andrew () on phone, update him regarding his condition that patient is not doing well and desaturating, we are trying to do all measures we can do and to provide comfort care, he understood, He said he will try to cme tomorrow.    A/P:  -Stat labs including CXR, CBC, BMP, Lactate, Blood gases, Blood cultures and Urine cultures, RVP/influenza, lactate  -will start broad spectrum antibiotic cefepime given 1 dose today and will give half dose tomorrow due to creatinine clearance (confirmed with pharmacy.  -Will do CT chest tomorrow  -c/w non rebreather mask  -Maya catheter  -NPO until speech and swallow recommendation  -Tyelnol suppository given  -Patient is DNR/DNI      Will continue to follow        At 12:48 am, Patient seen again, his condition becoming worse, He was agonally breathing, with O2 sat fluctuating in 70s-80s, His extremeties became cyanotic, Could not do Blood cultures and ABGs because patient has feeble pulse, and cyanotic extremities. CXR shows some haziness on left side, S/P one dose of Cefepime    Called HCP Andrew again, discussed in detail his entire condition, Asked again whether they want comfort measures, and how aggressive he want, He said he want only comfort care, with pain management. He fully understood patient's condition.    A/P:   -Dilaudid every 6 hrs  -Frequent suctioning  -comfort measures        Discussed with Attending and PGY3 Paged by nurse, Patient has laboured breathing and RR of 41 O2 sat of 91% /56 and Temp of 102F. Patient seen at bedside, He is AX0=0, on Non rebreather venti mask with O2 sat of 91-93%, not responding to commands, On PE: He has agonal breathing with Accessory muscles use, Chest clear to auscultation, But harsh vesicular breathing. likely aspirated PNA or Influenza/RVP, Does not look fluid overloaded.    Called Patient's Relative Andrew () on phone, update him regarding his condition that patient is not doing well and desaturating, we are trying to do all measures we can do and to provide comfort care, he understood, He said he will try to cme tomorrow.    A/P:  -Stat labs including CXR, CBC, BMP, Lactate, Blood gases, Blood cultures and Urine cultures, RVP/influenza, lactate  -will start broad spectrum antibiotic cefepime given 1 dose today and will give half dose tomorrow due to creatinine clearance (confirmed with pharmacy.  -Will do CT chest tomorrow  -c/w non rebreather mask  -Maya catheter  -NPO until speech and swallow recommendation  -Tyelnol suppository given  -Patient is DNR/DNI      Will continue to follow        At 12:48 am, Patient seen again, his condition becoming worse, He was agonally breathing, with O2 sat fluctuating in 70s-80s, His extremeties became cyanotic, Could not do Blood cultures and ABGs because patient has feeble pulse, and cyanotic extremities. CXR shows some haziness on left side, S/P one dose of Cefepime    Called HCP Andrew again, discussed in detail his entire condition, Asked again whether they want comfort measures, and how aggressive he want, He said he want only comfort care, with pain management. He fully understood patient's condition. They will come as soon as possible    A/P:   -Dilaudid every 6 hrs  -Frequent suctioning  -comfort measures        Discussed with Attending and PGY3 Paged by nurse, Patient has laboured breathing and RR of 41 O2 sat of 91% /56 and Temp of 102F. Patient seen at bedside, He is AX0=0, on Non rebreather venti mask with O2 sat of 91-93%, not responding to commands, On PE: He has agonal breathing with Accessory muscles use, Chest clear to auscultation, But harsh vesicular breathing. likely aspirated PNA or Influenza/RVP, Does not look fluid overloaded.    Called Patient's Relative Andrew () on phone, update him regarding his condition that patient is not doing well and desaturating, we are trying to do all measures we can do and to provide comfort care, he understood, He said he will try to come tomorrow. Told that patient's had very poor prognosis.    A/P:  -Stat labs including CXR, CBC, BMP, Lactate, Blood gases, Blood cultures and Urine cultures, RVP/influenza, lactate  -will start broad spectrum antibiotic cefepime given 1 dose today and will give half dose tomorrow due to creatinine clearance (confirmed with pharmacy.  -Will do CT chest tomorrow  -c/w non rebreather mask  -Maya catheter  -NPO until speech and swallow recommendation  -Tyelnol suppository given  -Patient is DNR/DNI  -Extremely poor prognosis, prognosis guarded      Will continue to follow        At 12:48 am, Patient seen again, his condition becoming worse, He was agonally breathing, with O2 sat fluctuating in 70s-80s, His extremeties became cyanotic, Could not do Blood cultures and ABGs because patient has feeble pulse, and cyanotic extremities. CXR shows some haziness on left side, S/P one dose of Cefepime    Called HCP Andrew again, discussed in detail his entire condition, Asked again whether they want comfort measures, and how aggressive he want, He said he want only comfort care, with pain management. He fully understood patient's condition. He will come as soon as possible    A/P:   -Dilaudid every 6 hrs  -Frequent suctioning  -comfort measures        Discussed with Attending Dr. Schuler and PGY3

## 2018-02-12 NOTE — PROGRESS NOTE ADULT - PROBLEM SELECTOR PLAN 2
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 Puckett's input; no AC for now 2/2 known colonic lesions  Cardiology Dr. Logan Presumed new onset- CHADSVASC 5, 7.2% and HASBLED 4, 9%  - C/w Digoxin + Lopressor 25 mg BID PO for rate and rhythm control  - No AC 2/2 concern for high risk of bleeding  - EGD/Colonoscopy 2/1 - one gastric ulcer that healed and multiple colonic ulcers; concern for ischemic colitis and possible IBD that'd require steroids; Bx results show chronic gastritis, ulcerated colonic mucosa, etc  - CTA abdomen/pelvis; showed multiple findings including mild celiac stenosis but nothing for acute management  ***AC w/ Plavix as per GI Dr. Puckett's input; no AC for now 2/2 known colonic lesions  Cardiology Dr. Logan following

## 2018-02-12 NOTE — PROGRESS NOTE ADULT - ATTENDING COMMENTS
Patient was seen and examined by myself. Case was discussed with house staff in details. I have reviewed and agree with the plan as outlined above with edits where appropriate.  Patient was seen multiple times durng the day due to pulmonary congestion noted earlier today; some  improvement with oxygen and duo nebs. CXR done with no acute infiltrate but questionable new nodule.   Also with acute renal failure- creatinine more than 2. IV fluids initiated. No fever but mild SOB requiring oxygen via nasal canula.  - close monitoring;  - pan culture if develops fever  - gentle hydration to correct hypernatremia and ARF  - monitor urine output; consider Maya catheter if needed.  - prognosis is guarded.  Other plan as outlined above

## 2018-02-12 NOTE — PROGRESS NOTE ADULT - ATTENDING COMMENTS
Patient seen and examined, agree with above assessment and plan as transcribed above.    - no need for further cardiac w/u  - medical management     Donald Logan MD, FACC  [facility], [facility]  [address] [place] [address]  PHONE:  [phone]  BEEPER : [phone]

## 2018-02-12 NOTE — PROGRESS NOTE ADULT - PROBLEM SELECTOR PLAN 1
1 episode of fever days ago; none since then  - Infectious w/u negative  ***C/w monitoring no fever but new onset leucocytosis 14k. patient needs repeat cbc in am as likely 2/2 dehydration.  1 episode of fever days ago; none since then  - Infectious w/u negative  ***C/w monitoring

## 2018-02-12 NOTE — PROGRESS NOTE ADULT - PROBLEM SELECTOR PLAN 5
***Resolved; 378/113 at admission, now normal.  - US abdomen supports cirrhosis w/ MELD score 15  - Patient to f/u w/ [facility] as outpatient in 2 weeks as per GI

## 2018-02-12 NOTE — PROGRESS NOTE ADULT - I WAS PHYSICALLY PRESENT FOR THE KEY PORTIONS OF THE EVALUATION AND MANAGEMENT (E/M) SERVICE PROVIDED.  I AGREE WITH THE ABOVE HISTORY, PHYSICAL, AND PLAN WHICH I HAVE REVIEWED AND EDITED WHERE APPROPRIATE

## 2018-02-12 NOTE — PROGRESS NOTE ADULT - SUBJECTIVE AND OBJECTIVE BOX
pt seen and examined, NAD on exam    dextrose 5% with potassium chloride 20 mEq/L 1000 milliLiter(s) IV Continuous <Continuous>  digoxin     Tablet 0.125 milliGRAM(s) Oral daily  insulin lispro (HumaLOG) corrective regimen sliding scale   SubCutaneous three times a day with meals  lactobacillus acidophilus 1 Tablet(s) Oral every 8 hours  lisinopril 2.5 milliGRAM(s) Oral daily  metoprolol succinate ER 25 milliGRAM(s) Oral daily  nystatin Powder 1 Application(s) Topical two times a day  pantoprazole    Tablet 40 milliGRAM(s) Oral before breakfast                            12.9   14.8  )-----------( 163      ( 12 Feb 2018 05:57 )             42.0       Hemoglobin: 12.9 g/dL (02-12 @ 05:57)  Hemoglobin: 11.9 g/dL (02-10 @ 06:48)  Hemoglobin: 11.5 g/dL (02-08 @ 06:09)      02-12    147<H>  |  110<H>  |  34<H>  ----------------------------<  177<H>  3.5   |  24  |  2.58<H>    Ca    9.1      12 Feb 2018 05:57  Phos  4.1     02-12  Mg     2.0     02-12      Creatinine Trend: 2.58<--, 0.75<--, 0.70<--, 0.78<--, 0.73<--, 0.85<--    COAGS:           T(C): 36.9 (02-12-18 @ 05:18), Max: 36.9 (02-11-18 @ 21:05)  HR: 111 (02-12-18 @ 05:18) (71 - 111)  BP: 114/67 (02-12-18 @ 05:18) (105/79 - 120/74)  RR: 18 (02-12-18 @ 05:18) (18 - 18)  SpO2: 96% (02-12-18 @ 05:18) (95% - 98%)  Wt(kg): --    I&O's Summary    HEENT:   Normal oral mucosa,   Lymphatic: No obvious lymphadenopathy , no edema  Cardiovascular: Normal S1 S2, No JVD, 1/6 PEDRO murmur, Peripheral pulses palpable 2+ bilaterally  Respiratory: Coarse mechanical BS, normal effort 	  Gastrointestinal:  Soft, Non-tender, + BS	  Psychiatry:  Confused      ECHO: < from: Transthoracic Echocardiogram (01.17.18 @ 14:22) >  CONCLUSIONS:  1. Normal mitral valve. Mild to moderate mitral  regurgitation.  2. Aortic valve not seen well.  3. Aortic Root: 3.1 cm.  4. Mild left atrial enlargement.  5. Normal left ventricular internal dimensions and wall  thicknesses.  6. Endocardium not well visualized; grossly severely  reduced l left ventricular systolic function.  7. Grade II diastolic dysfunction.  8. Normal right atrium.  9. Normal right ventricular size and function.  10. RA Pressure is 8 mm Hg.  11. RV systolic pressure is 33 mm Hg.  12. Normal tricuspid valve.  13. Normal pulmonic valve.  14. Normal pericardium with no pericardial effusion.    < end of copied text >      ASSESSMENT/PLAN: 	89y Male ? cirrhosis found down by his superintendant,  admitted with respiratory failure new rapid afib, shock, found with severe LV dyfx  of unknown duration.    cont rate control with Dig , BB  cont ACE - BP  stable    GI / DVT prophylaxis.  monitor off Diuretics    keep K>4, mag >2.0  GI follow up - no a/c at present -  ? asa for now   fall precaution / Aspiration precaution    D/W Dr Logan

## 2018-02-12 NOTE — PROGRESS NOTE ADULT - PROBLEM SELECTOR PLAN 4
Resolved.   ***Right renal complex cyst noted, recommend repeat renal US in 3 months resolved, but not eating and drinking well. will start on d5 @ 50cc x 10 hours.  ***Right renal complex cyst noted, recommend repeat renal US in 3 months  nicole also give lasix 20 IV x 1 dose.

## 2018-02-12 NOTE — PROGRESS NOTE ADULT - PROBLEM SELECTOR PLAN 8
IMPROVE VTE Individual Risk Assessment    RISK                                                          Points  [] Previous VTE                                           3  [] Thrombophilia                                        2  [] Lower limb paralysis                              2   [] Current Cancer                                       2   [x] Immobilization > 24 hrs                        1  [x] ICU/CCU stay > 24 hours                       1  [x] Age > 60                                                   1    IMPROVE VTE Score: 3, Lovenox
on HSS  f/u  S/S eval   on dysphagia diet
IMPROVE VTE Individual Risk Assessment    RISK                                                          Points  [] Previous VTE                                           3  [] Thrombophilia                                        2  [] Lower limb paralysis                              2   [] Current Cancer                                       2   [x] Immobilization > 24 hrs                        1  [x] ICU/CCU stay > 24 hours                       1  [x] Age > 60                                                   1    IMPROVE VTE Score: 3, Lovenox
Lantus 8U HS, HSS FS AC HS  HbA1C: 7.5%
Lantus 8U HS, HSS FS AC HS  HbA1C: 7.5%
on HSS  f/u  S/S eval   on dysphagia diet
potassium replaced.  Labs in am
IMPROVE VTE Individual Risk Assessment    RISK                                                          Points  [] Previous VTE                                           3  [] Thrombophilia                                        2  [] Lower limb paralysis                              2   [] Current Cancer                                       2   [x] Immobilization > 24 hrs                        1  [x] ICU/CCU stay > 24 hours                       1  [x] Age > 60                                                   1    IMPROVE VTE Score: 3, Lovenox

## 2018-02-12 NOTE — PROGRESS NOTE ADULT - PROVIDER SPECIALTY LIST ADULT
Cardiology
Gastroenterology
Gastroenterology
Hospitalist
Internal Medicine
MICU
Nephrology
Palliative Care
Cardiology
Cardiology
MICU
Nephrology
Nephrology
Internal Medicine
Hospitalist

## 2018-02-12 NOTE — PROGRESS NOTE ADULT - SUBJECTIVE AND OBJECTIVE BOX
PGY 1 Note discussed with supervising resident and primary attending    Patient is a 84y old  Male who presents with a chief complaint of acute hypoxic respiratory failure and metabolic encephalopathy (23 Jan 2018 15:10)      INTERVAL HPI/OVERNIGHT EVENTS: offers no new complaints; current symptoms resolving    MEDICATIONS  (STANDING):  digoxin     Tablet 0.125 milliGRAM(s) Oral daily  insulin lispro (HumaLOG) corrective regimen sliding scale   SubCutaneous three times a day with meals  lactobacillus acidophilus 1 Tablet(s) Oral every 8 hours  lisinopril 2.5 milliGRAM(s) Oral daily  metoprolol succinate ER 25 milliGRAM(s) Oral daily  nystatin Powder 1 Application(s) Topical two times a day  pantoprazole    Tablet 40 milliGRAM(s) Oral before breakfast  sodium chloride 0.9% Bolus 500 milliLiter(s) IV Bolus once    MEDICATIONS  (PRN):      __________________________________________________  REVIEW OF SYSTEMS:    CONSTITUTIONAL: No fever,   EYES: no acute visual disturbances  NECK: No pain or stiffness  RESPIRATORY: No cough; No shortness of breath  CARDIOVASCULAR: No chest pain, no palpitations  GASTROINTESTINAL: No pain. No nausea or vomiting; No diarrhea   NEUROLOGICAL: No headache or numbness, no tremors  MUSCULOSKELETAL: No joint pain, no muscle pain  GENITOURINARY: no dysuria, no frequency, no hesitancy  PSYCHIATRY: no depression , no anxiety  ALL OTHER  ROS negative        Vital Signs Last 24 Hrs  T(C): 36.9 (12 Feb 2018 05:18), Max: 36.9 (11 Feb 2018 21:05)  T(F): 98.5 (12 Feb 2018 05:18), Max: 98.5 (11 Feb 2018 21:05)  HR: 111 (12 Feb 2018 05:18) (71 - 111)  BP: 114/67 (12 Feb 2018 05:18) (105/79 - 120/74)  BP(mean): --  RR: 18 (12 Feb 2018 05:18) (18 - 18)  SpO2: 96% (12 Feb 2018 05:18) (95% - 98%)    ________________________________________________  PHYSICAL EXAM:  GENERAL: NAD  HEENT: Normocephalic;  conjunctivae and sclerae clear; moist mucous membranes;   NECK : supple  CHEST/LUNG: Clear to auscultation bilaterally with good air entry   HEART: S1 S2  regular; no murmurs, gallops or rubs  ABDOMEN: Soft, Nontender, Nondistended; Bowel sounds present  EXTREMITIES: no cyanosis; no edema; no calf tenderness  SKIN: warm and dry; no rash  NERVOUS SYSTEM:  Awake and alert; Oriented  to place, person and time ; no new deficits    _________________________________________________  LABS:                        12.9   14.8  )-----------( 163      ( 12 Feb 2018 05:57 )             42.0     02-12    147<H>  |  110<H>  |  34<H>  ----------------------------<  177<H>  3.5   |  24  |  2.58<H>    Ca    9.1      12 Feb 2018 05:57  Phos  4.1     02-12  Mg     2.0     02-12          CAPILLARY BLOOD GLUCOSE      POCT Blood Glucose.: 167 mg/dL (12 Feb 2018 07:21)  POCT Blood Glucose.: 148 mg/dL (11 Feb 2018 21:18)  POCT Blood Glucose.: 131 mg/dL (11 Feb 2018 16:43)  POCT Blood Glucose.: 163 mg/dL (11 Feb 2018 11:58)        RADIOLOGY & ADDITIONAL TESTS:    Imaging Personally Reviewed:  YES/NO    Consultant(s) Notes Reviewed:   YES/ No    Care Discussed with Consultants :     Plan of care was discussed with patient and /or primary care giver; all questions and concerns were addressed and care was aligned with patient's wishes. PGY 1 Note discussed with supervising resident and primary attending    Patient is a 84y old  Male who presents with a chief complaint of acute hypoxic respiratory failure and metabolic encephalopathy (23 Jan 2018 15:10)      INTERVAL HPI/OVERNIGHT EVENTS: Patient was seen and examined at bed side. patient looked dehydrated. 1 bolus 500 Ml given. later patient looked congested due to poor ejection fraction and sudden elevation of creatinine. will give 1dose lasix. will follow up bmp in am.     MEDICATIONS  (STANDING):  digoxin     Tablet 0.125 milliGRAM(s) Oral daily  insulin lispro (HumaLOG) corrective regimen sliding scale   SubCutaneous three times a day with meals  lactobacillus acidophilus 1 Tablet(s) Oral every 8 hours  lisinopril 2.5 milliGRAM(s) Oral daily  metoprolol succinate ER 25 milliGRAM(s) Oral daily  nystatin Powder 1 Application(s) Topical two times a day  pantoprazole    Tablet 40 milliGRAM(s) Oral before breakfast  sodium chloride 0.9% Bolus 500 milliLiter(s) IV Bolus once    MEDICATIONS  (PRN):      __________________________________________________  REVIEW OF SYSTEMS:    CONSTITUTIONAL: No fever,   EYES: no acute visual disturbances  NECK: No pain or stiffness  RESPIRATORY: shortness of breath   CARDIOVASCULAR:  no rubs, + murmur   GASTROINTESTINAL: No pain. No nausea or vomiting; No diarrhea   NEUROLOGICAL: No headache or numbness, no tremors  MUSCULOSKELETAL: No joint pain, no muscle pain  GENITOURINARY: no dysuria, no frequency, no hesitancy  PSYCHIATRY: confused   ALL OTHER  ROS negative        Vital Signs Last 24 Hrs  T(C): 36.9 (12 Feb 2018 05:18), Max: 36.9 (11 Feb 2018 21:05)  T(F): 98.5 (12 Feb 2018 05:18), Max: 98.5 (11 Feb 2018 21:05)  HR: 111 (12 Feb 2018 05:18) (71 - 111)  BP: 114/67 (12 Feb 2018 05:18) (105/79 - 120/74)  BP(mean): --  RR: 18 (12 Feb 2018 05:18) (18 - 18)  SpO2: 96% (12 Feb 2018 05:18) (95% - 98%)    ________________________________________________  PHYSICAL EXAM:  GENERAL: NAD  HEENT: Normocephalic;  conjunctivae and sclerae clear; moist mucous membranes;   NECK : supple  CHEST/LUNG: bilateral rales   HEART: S1 S2  regular; no murmurs, gallops or rubs  ABDOMEN: Soft, Nontender, Nondistended; Bowel sounds present  EXTREMITIES: no cyanosis; no edema; no calf tenderness  SKIN: warm and dry; no rash  NERVOUS SYSTEM:  Awake and alert; x 1    _________________________________________________  LABS:                        12.9   14.8  )-----------( 163      ( 12 Feb 2018 05:57 )             42.0     02-12    147<H>  |  110<H>  |  34<H>  ----------------------------<  177<H>  3.5   |  24  |  2.58<H>    Ca    9.1      12 Feb 2018 05:57  Phos  4.1     02-12  Mg     2.0     02-12          CAPILLARY BLOOD GLUCOSE      POCT Blood Glucose.: 167 mg/dL (12 Feb 2018 07:21)  POCT Blood Glucose.: 148 mg/dL (11 Feb 2018 21:18)  POCT Blood Glucose.: 131 mg/dL (11 Feb 2018 16:43)  POCT Blood Glucose.: 163 mg/dL (11 Feb 2018 11:58)        RADIOLOGY & ADDITIONAL TESTS:    Imaging Personally Reviewed:  YES/NO    Consultant(s) Notes Reviewed:   YES/ No    Care Discussed with Consultants :     Plan of care was discussed with patient and /or primary care giver; all questions and concerns were addressed and care was aligned with patient's wishes.

## 2018-02-12 NOTE — PROGRESS NOTE ADULT - PROBLEM SELECTOR PLAN 6
BMP wnls; resolved w/ trial of Lactulose; AAOx2 person and place  - [place]ican speaking, independently living at home prior to admission BMP wnls;   resolved w/ trial of Lactulose; AAOx2 person and place  - [place]inean speaking, independently living at home prior to admission

## 2018-02-13 NOTE — DISCHARGE NOTE FOR THE EXPIRED PATIENT - HOSPITAL COURSE
84 yr old M PMH  DM, HTN, cirrhosis is BIBEMS after being found on floor in respiratory distress and with significant metabolic acidosis from ARF with oliguria from dehydration,  lactic acidosis and fevers was admitted in ICU initially for acute hypoxic respiratory failure 2/2 aspiration pneumonia and septic shock s/p intubation and IVF, was also given tamiflu, had also other ,multiple issues including Afib with RVR (was placed on heparin), hypernatremia, ARF, metabolic encephalopathy, liver cirrhosis later developed BRPBR, AC was held  concern for high risk of bleeding. EGD/Colonoscopy  - one gastric ulcer that healed and multiple colonic ulcers; concern for ischemic colitis and possible IBD that'd require steroids; Bx results show chronic gastritis, ulcerated colonic mucosa, His u/s abdomen showed liver cirrhosis Later patient was downgraded to floors and was being managed on floors, .his prognosis was poor, Patient HCP wanted DNR/DNI and comfort care for him    On , Patient's condition became worse, he started desaturating on nonrebreather venti mask and was breathing agonally, likely aspirated and developed PNA, was started on antibiotics, HCP was being updated constantly, Patient's deteriorated with passage of time, despite suctioning, increasing Ox, Discussed with HCP again on phone he wanted to give comfort care as much as possible and to give analgesics.    On , I was notified by nursing at 1:24am that patient had stopped breathing. I came at bedside  and examined the patient. There was no pupillary response to light. I did not observe spontaneous breathing or appreciate heart sounds on auscultation. There was no palpable radial pulse. Patient was pronounced  at 1:25 am  on 2018. I called the HCP Andrew on phone. Offered my condolences and told the that patient has passed away. He said his wife would come soon. He was unsure about autopsy at this time.    Attending informed    Please refer to patient's complete medical chart with documents for a full hospital course, for this is only a brief summary. 84 yr old M PMH  DM, HTN, cirrhosis is BIBEMS after being found on floor in respiratory distress and with significant metabolic acidosis from ARF with oliguria from dehydration,  lactic acidosis and fevers was admitted in ICU initially for acute hypoxic respiratory failure 2/2 aspiration pneumonia and septic shock s/p intubation and IVF, was also given tamiflu, later downgraded to medical floor, had developed multiple issues including Afib with RVR (was placed on heparin), hypernatremia, ARF, metabolic encephalopathy, liver cirrhosis later developed BRPBR, AC was held 2 concern for high risk of bleeding. EGD/Colonoscopy  - showed ischemic colitis and possible IBD; (Bx results showed chronic gastritis, ulcerated colonic mucosa,) acute renal failure, had dysphagia, (was placed on aspiration precautions) and failure to thrive .His prognosis was poor since from beginning. He was being managed on medical floors appropriately. His mental status improved some days  but then he developed again metabolic encephalopathy with worsening acute renal and respiratory failure.  Patient HCP (Andrew) wanted DNR/DNI and comfort care for him.    On , Patient's condition became worse, he started desaturating on nonrebreather venti mask and was breathing agonally, likely aspirated and developed PNA, as CXR showed left sided haziness, was started on antibiotics, HCP was being updated constantly, Patient's deteriorated with passage of time, despite suctioning, increasing Ox, Discussed with HCP again on phone he wanted to give comfort care as much as possible and to give analgesics.    On , I was notified by nursing at 1:24am that patient had stopped breathing. I came at bedside  and examined the patient. There was no pupillary response to light. I did not observe spontaneous breathing or appreciate heart sounds on auscultation. There was no palpable radial pulse. EKG showed asystole/flat line. Patient was pronounced  at 1:25 am  on 2018. I called the HCP Anderw on phone. Offered my condolences and told the that patient has passed away. He said his wife would come soon. He was unsure about autopsy at this time.    Attending informed    Please refer to patient's complete medical chart with documents for a full hospital course, for this is only a brief summary.

## 2018-02-13 NOTE — PROVIDER CONTACT NOTE (OTHER) - SITUATION
Pt has labored breathing.  Vitals /56 HR 91 RR 41 02 91% Temp 102.7.  Pt placed on non rebreather. Dr. Ontiveros notified.

## 2018-02-13 NOTE — PROVIDER CONTACT NOTE (OTHER) - ACTION/TREATMENT ORDERED:
Stat nebulizer treatment given, blood work done, chest x-ray done, antibiotics started.  After speaking with family MEWS was suspended and comfort measures initiated. Dilaudid 0.5mg given.

## 2018-10-30 RX ORDER — METOPROLOL TARTRATE 50 MG
1 TABLET ORAL
Qty: 0 | Refills: 0 | COMMUNITY

## 2018-10-30 RX ORDER — GABAPENTIN 400 MG/1
1 CAPSULE ORAL
Qty: 0 | Refills: 0 | COMMUNITY

## 2018-10-30 RX ORDER — PIOGLITAZONE HYDROCHLORIDE 15 MG/1
1 TABLET ORAL
Qty: 0 | Refills: 0 | COMMUNITY

## 2018-10-30 RX ORDER — LOSARTAN POTASSIUM 100 MG/1
1 TABLET, FILM COATED ORAL
Qty: 0 | Refills: 0 | COMMUNITY

## 2018-10-30 RX ORDER — SIMVASTATIN 20 MG/1
1 TABLET, FILM COATED ORAL
Qty: 0 | Refills: 0 | COMMUNITY

## 2018-10-30 RX ORDER — SITAGLIPTIN AND METFORMIN HYDROCHLORIDE 500; 50 MG/1; MG/1
1 TABLET, FILM COATED ORAL
Qty: 0 | Refills: 0 | COMMUNITY

## 2018-10-30 RX ORDER — DONEPEZIL HYDROCHLORIDE 10 MG/1
1 TABLET, FILM COATED ORAL
Qty: 0 | Refills: 0 | COMMUNITY

## 2018-10-30 RX ORDER — GLIMEPIRIDE 1 MG
1 TABLET ORAL
Qty: 0 | Refills: 0 | COMMUNITY

## 2018-10-30 RX ORDER — FENOFIBRATE,MICRONIZED 130 MG
1 CAPSULE ORAL
Qty: 0 | Refills: 0 | COMMUNITY

## 2018-10-30 RX ORDER — FOLIC ACID 0.8 MG
1 TABLET ORAL
Qty: 0 | Refills: 0 | COMMUNITY

## 2020-09-02 NOTE — SWALLOW BEDSIDE ASSESSMENT ADULT - SWALLOW EVAL: RECOMMENDED FEEDING/EATING TECHNIQUES
Patient completed Session 11 of Phase II Monitored Cardiac Rehabilitation. Please see Media Tab for scanned in Exercise Session Report.    
alternate food with liquid/oral hygiene/allow for swallow between intakes/check mouth frequently for oral residue/pocketing/position upright (90 degrees)/small sips/bites

## 2020-10-26 NOTE — PROGRESS NOTE ADULT - PROBLEM SELECTOR PLAN 4
US abdomen supports cirrhosis w/ MELD score 15  - Patient to f/u w/ [facility] as outpatient in 2 weeks  ***Resolved; 378/113 at admission, now normal. SPOUSE

## 2021-01-23 NOTE — PROGRESS NOTE ADULT - ASSESSMENT
Occupational Therapy  Visit Type: initial evaluation  Precautions:  Medical precautions: COVID negative 1/21/2021  Lines:     Basic: capped IV and telemetry    SUBJECTIVE                                                                                                            Patient agreed to participate in therapy this date.  Patient states he is able to manage his own self-care tasks at home with modified techniques due to not being able to shower with the wounds on his legs. Patient has C RN to complete wound care. Patient denies further needs for [facility] services. Patient feels he is ready to return home.   Patient / Family Goal: return home      OBJECTIVE                                                                                                                Oriented to person, place, time and situation     Arousal alertness: appropriate responses to stimuli    Affect/Behavior: appropriate and cooperative  Functional Communication/Cognition    Overall status:  Within functional limits  Range of Motion (measured in degrees unless otherwise indicated)  WFL: LUE RUE  Strength (out of 5 unless otherwise indicated)  WFL: LUE, RUE  Bed mobility:      Repositioning in bed: independent  Activities of Daily Living (ADLs):  Activities of daily living training:   On this date, the patient was educated on diagnosis considerations pertaining to rehab, safe use of equipment and energy conservation/pacing strategies.  The response to education was: Verbalizes understanding and Demonstrates understanding. Patient was able to work well with physical therapist this AM for longer walk and stair management. He is sore in his upper shoulders and neck but overall feels strong and ready to return to home.                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 Patient presents at baseline  which was Independent with activities of daily living.    For safe return to prior living situation the patient needs to be at a modified independent  level for ADLs. Patient is currently functioning at modified independent  for ADL skills and modified independent  for functional mobility, but SBA will be provided in hospital setting.     Collaborated with MARVIN Hess regarding discharge recommendations and patient's status.          Discharge Recommendations  Recommendations for Discharge: OT WI: Home                          Skilled therapy is not required due to Pt will return to home when medially cleared.   Clinical decision making: Low - Patient has few limitations (1-3), comorbidities and/or complexities, as noted in problem focused assessment noted above, that impact their occupational profile.  Resulting in few treatment options and no task modification consistent with low clinical decision making complexity.    End of Session:   Location: in bed  Safety measures: alarm system in place/re-engaged  Handoff to: nurse    PLAN                                                                                                                          Suggestions for next session as indicated: Discontinue acute level OT at this time, no further needs.        Interventions: activity tolerance training, ADL retraining, therapeutic exercise, therapeutic activity, compensatory technique education, positioning, patient education, energy conservation, functional transfer training and safety training      Documented in the chart in the following areas: Prior Level of Function. Pain. Assessment. Plan.       84 M PMH DM, HTN, Cirrhosis is BIB EMS after being found on floor in respiratory distress x 2 days with significant metabolic acidosis from ARF with oliguria from dehydration, lactic acidosis and fevers - admitted to MICU for Acute Hypoxic Respiratory Failure 2/2 to Sepsis and Hypovolemia - responded well to fluid resuscitation, octreotide, and albumin but found to have severe LV dysfunction (EF 25-30%) - downgraded for continued medical care

## 2021-05-18 NOTE — PROGRESS NOTE ADULT - PROBLEM/PLAN-8
DISPLAY PLAN FREE TEXT
No
DISPLAY PLAN FREE TEXT

## 2022-02-18 NOTE — PROGRESS NOTE ADULT - PROBLEM SELECTOR PROBLEM 2
What Type Of Note Output Would You Prefer (Optional)?: Bullet Format Hpi Title: Evaluation of a Skin Lesion How Severe Are Your Spot(S)?: mild Have Your Spot(S) Been Treated In The Past?: has not been treated Psychiatric problem

## 2023-12-29 NOTE — ED PROVIDER NOTE - DISCUSSED CLINICAL AND RADIOLOGICAL FINDINGS WITH, MDM
Dr. Colon suggested patient establish care to address esophageal issues. Called patient to schedule appt. Patient states he's aware of issue but is not sure where he wants to on board yet and will call us back once a decision was made.    patient

## 2024-08-02 NOTE — DIETITIAN INITIAL EVALUATION ADULT. - 20 CAL FROM
A user error has taken place: encounter opened in error, closed for administrative reasons.    Group Topic:  Education    Date: 11/2/2021  Start Time: 1030  End Time: 1115  Facilitators: Dante Costa; Mar Anderson MS    Pt was recruited for group but did not attend. Efforts to encourage participation in programming on the unit will continue.  Dante Costa, LPC       [phone]

## 2025-02-06 NOTE — PROVIDER CONTACT NOTE (CRITICAL VALUE NOTIFICATION) - TEST AND RESULT REPORTED:
prelim , blood cultures anaerobic  bottle gram positive cocci in clusters
troponin 0.909
Render In Strict Bullet Format?: No
Detail Level: Zone